# Patient Record
Sex: FEMALE | Race: WHITE | NOT HISPANIC OR LATINO | Employment: OTHER | URBAN - METROPOLITAN AREA
[De-identification: names, ages, dates, MRNs, and addresses within clinical notes are randomized per-mention and may not be internally consistent; named-entity substitution may affect disease eponyms.]

---

## 2019-06-18 ENCOUNTER — APPOINTMENT (OUTPATIENT)
Dept: RADIOLOGY | Facility: MEDICAL CENTER | Age: 69
End: 2019-06-18
Payer: COMMERCIAL

## 2019-06-18 ENCOUNTER — OFFICE VISIT (OUTPATIENT)
Dept: OBGYN CLINIC | Facility: MEDICAL CENTER | Age: 69
End: 2019-06-18
Payer: COMMERCIAL

## 2019-06-18 VITALS — BODY MASS INDEX: 27.35 KG/M2 | WEIGHT: 160.2 LBS | HEIGHT: 64 IN

## 2019-06-18 DIAGNOSIS — M25.561 PAIN IN BOTH KNEES, UNSPECIFIED CHRONICITY: ICD-10-CM

## 2019-06-18 DIAGNOSIS — M25.561 PAIN IN BOTH KNEES, UNSPECIFIED CHRONICITY: Primary | ICD-10-CM

## 2019-06-18 DIAGNOSIS — M25.562 PAIN IN BOTH KNEES, UNSPECIFIED CHRONICITY: ICD-10-CM

## 2019-06-18 DIAGNOSIS — M25.562 PAIN IN BOTH KNEES, UNSPECIFIED CHRONICITY: Primary | ICD-10-CM

## 2019-06-18 DIAGNOSIS — M17.0 PRIMARY OSTEOARTHRITIS OF BOTH KNEES: ICD-10-CM

## 2019-06-18 PROCEDURE — 99203 OFFICE O/P NEW LOW 30 MIN: CPT | Performed by: EMERGENCY MEDICINE

## 2019-06-18 PROCEDURE — 73562 X-RAY EXAM OF KNEE 3: CPT

## 2019-06-24 ENCOUNTER — TELEPHONE (OUTPATIENT)
Dept: OBGYN CLINIC | Facility: HOSPITAL | Age: 69
End: 2019-06-24

## 2019-07-01 ENCOUNTER — EVALUATION (OUTPATIENT)
Dept: PHYSICAL THERAPY | Facility: CLINIC | Age: 69
End: 2019-07-01
Payer: COMMERCIAL

## 2019-07-01 DIAGNOSIS — M25.561 PAIN IN BOTH KNEES, UNSPECIFIED CHRONICITY: ICD-10-CM

## 2019-07-01 DIAGNOSIS — M17.0 PRIMARY OSTEOARTHRITIS OF BOTH KNEES: ICD-10-CM

## 2019-07-01 DIAGNOSIS — M25.562 PAIN IN BOTH KNEES, UNSPECIFIED CHRONICITY: ICD-10-CM

## 2019-07-01 PROCEDURE — 97161 PT EVAL LOW COMPLEX 20 MIN: CPT | Performed by: PHYSICAL THERAPIST

## 2019-07-01 PROCEDURE — 97110 THERAPEUTIC EXERCISES: CPT | Performed by: PHYSICAL THERAPIST

## 2019-07-01 RX ORDER — LANOLIN ALCOHOL/MO/W.PET/CERES
1 CREAM (GRAM) TOPICAL 2 TIMES DAILY
COMMUNITY

## 2019-07-01 RX ORDER — UREA 10 %
100 LOTION (ML) TOPICAL DAILY
COMMUNITY
End: 2019-08-19

## 2019-07-01 RX ORDER — RIBOFLAVIN (VITAMIN B2) 100 MG
100 TABLET ORAL DAILY
COMMUNITY

## 2019-07-01 RX ORDER — OMEGA-3S/DHA/EPA/FISH OIL/D3 300MG-1000
400 CAPSULE ORAL DAILY
COMMUNITY

## 2019-07-01 RX ORDER — MULTIVIT-MIN/IRON FUM/FOLIC AC 7.5 MG-4
1 TABLET ORAL DAILY
COMMUNITY

## 2019-07-01 NOTE — PROGRESS NOTES
PT Evaluation     Today's date: 2019  Patient name: Forest Mckay  : 1950  MRN: [id number]  Referring provider: Char Bellamy MD  Dx:   Encounter Diagnosis     ICD-10-CM    1  Pain in both knees, unspecified chronicity M25 561 Ambulatory referral to Physical Therapy    M25 562    2  Primary osteoarthritis of both knees M17 0 Ambulatory referral to Physical Therapy                  Assessment  Assessment details: Patient is a 80 y/o female with chief complaints of bilateral knee pain  Patient presents with decreased functional mobility due to increased pain, decreased strength, decreased ROM associated with mild OA and worsened due to several previous instances of left patellar dislocation  Patient will benefit from skilled physical therapy to address impairment and improve functional mobility  PT needed to allow for return to maximal function and improve quality of life  Impairments: abnormal or restricted ROM, activity intolerance, impaired physical strength, lacks appropriate home exercise program and pain with function  Understanding of Dx/Px/POC: good   Prognosis: good    Goals  STG within 4 weeks:   1  Patient to be independent in HEP  2  Reduce pain by 50% to improve quality of life  3  Improve left knee extension to 0 degrees to improve gait and stairs  4  Improve bilateral knee strength to 4+ to improve gait and stairs  LTG within 8 weeks:   1  Patient to be independent in ADLs/IADLs without difficulty  2  Patient to be able to perform reciprocal stairs for ascending and descending  3  Patient to be able to walk community distances without difficulty  Plan  Plan details: Due to self-report of 1 fall in the last year, will address balance, proprioception, and gait with this treatment cycle  TUG to be completed at next visit      Patient would benefit from: skilled physical therapy and PT eval  Planned modality interventions: cryotherapy, hydrotherapy, unattended electrical stimulation, H-Wave and TENS  Planned therapy interventions: therapeutic training, therapeutic exercise, therapeutic activities, stretching, strengthening, postural training, patient education, neuromuscular re-education, manual therapy, joint mobilization, IADL retraining, activity modification, ADL retraining, ADL training, body mechanics training, flexibility, functional ROM exercises, gait training, graded activity, graded exercise, graded motor, home exercise program, South taping, balance/weight bearing training, balance and abdominal trunk stabilization  Frequency: 2x week  Duration in weeks: 8  Plan of Care beginning date: 2019  Plan of Care expiration date: 2019  Treatment plan discussed with: patient        Subjective Evaluation    History of Present Illness  Date of onset: 2019  Mechanism of injury: Patient is a 80 y/o female with chief complaints of bilateral knee pain (left worse than right)  She states she has had knee problems since she was 17years old  She states she has had several knee injuries throughout the years, including patellar dislocations  She states she occasionally uses a cane for ambulation  Xrays of bilateral knees shows mild OA  She is referred for evaluation and treatment of bilateral knees  Recurrent probem    Pain  Current pain rating: 3  At best pain rating: 3  At worst pain ratin  Quality: dull ache and sharp  Relieving factors: rest  Progression: worsening    Social Support  Steps to enter house: yes  Stairs in house: yes   Lives in: multiple-level home  Lives with: spouse    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 )  Hand dominance: right      Diagnostic Tests  Abnormal x-ray: Mild OA     Treatments  No previous or current treatments  Patient Goals  Patient goal: to be able to perform reciprocal stairs         Objective     Palpation     Additional Palpation Details  Moderate pain with palpation of left knee medial and lateral joint lines and posterior aspect of knee  Mild pain with palpation of right knee at medial and lateral joint lines  Active Range of Motion   Left Knee   Flexion: 122 degrees   Extension: -5 degrees   Extensor lag: -5 degrees     Right Knee   Flexion: 125 degrees   Extension: 0 degrees   Extensor la degrees     Strength/Myotome Testing     Left Knee   Flexion: 4  Extension: 3    Right Knee   Flexion: 4  Extension: 4    General Comments:      Knee Comments  Pain with left knee varus and valgus stress testing; but no instability noted at L or R knee  Hypermobility of left patella  Flowsheet Rows      Most Recent Value   PT/OT G-Codes   Current Score  38   Projected Score  57             Precautions: h/o multiple left knee patellar dislocations       Manual              Left knee STR                                                                    Increased time spent on patient education with diagnosis, prognosis, goals of therapy, progression of therapy, and plan of care  All questions answered  Patient instructed to call clinic with questions or concerns  Written HEP provided to patient  Pt to hold on any exercise that increases pain  Exercise Diary              Recumbent bike NV            Calf stretch NV            Hamstring stretch NV            Quad set 3'x20            Adductor set 3"x20            SLR x10 ea            clamshell Red x20 ea                                                                                                                                                                                                    Pt instructed to ice 2-3x/day 10-15 min     Modalities

## 2019-07-03 ENCOUNTER — APPOINTMENT (OUTPATIENT)
Dept: PHYSICAL THERAPY | Facility: CLINIC | Age: 69
End: 2019-07-03
Payer: COMMERCIAL

## 2019-07-08 ENCOUNTER — OFFICE VISIT (OUTPATIENT)
Dept: PHYSICAL THERAPY | Facility: CLINIC | Age: 69
End: 2019-07-08
Payer: COMMERCIAL

## 2019-07-08 DIAGNOSIS — M17.0 PRIMARY OSTEOARTHRITIS OF BOTH KNEES: ICD-10-CM

## 2019-07-08 DIAGNOSIS — M25.561 PAIN IN BOTH KNEES, UNSPECIFIED CHRONICITY: Primary | ICD-10-CM

## 2019-07-08 DIAGNOSIS — M25.562 PAIN IN BOTH KNEES, UNSPECIFIED CHRONICITY: Primary | ICD-10-CM

## 2019-07-08 PROCEDURE — 97140 MANUAL THERAPY 1/> REGIONS: CPT

## 2019-07-08 PROCEDURE — 97110 THERAPEUTIC EXERCISES: CPT

## 2019-07-08 NOTE — PROGRESS NOTES
Daily Note     Today's date: 2019  Patient name: Edith Sherwood  : 1950  MRN: [id number]  Referring provider: Anurag Trujillo MD  Dx:   Encounter Diagnosis     ICD-10-CM    1  Pain in both knees, unspecified chronicity M25 561     M25 562    2  Primary osteoarthritis of both knees M17 0                   Subjective: Upon presentation patient repots B knee discomfort secondary to the "weather"  Objective: See treatment diary below      Assessment: Patient able to tolerate above charted TE with no complaints  Patient repots relief B knees following STM  VC required to facilitate proper form and dosage t/o TE  Increased time spent on education and importance of HEP  Patient offers no complaints post session  Plan: Cont per plan of care, progress as able  Precautions: h/o multiple left knee patellar dislocations       Manual              Left knee STR   B/l 12'                                                                   Exercise Diary             Recumbent bike NV 7 min           Calf stretch NV :30x4           Hamstring stretch NV :30x4           Quad set 3'x20 3'x20           Adductor set 3"x20 3"x20           SLR x10 ea x20ea           clamshell Red x20 ea Red x20 ea           Standing hip abd/ext  20xea                                                                                                                                                                                      Pt instructed to ice 2-3x/day 10-15 min     Modalities

## 2019-07-10 ENCOUNTER — OFFICE VISIT (OUTPATIENT)
Dept: PHYSICAL THERAPY | Facility: CLINIC | Age: 69
End: 2019-07-10
Payer: COMMERCIAL

## 2019-07-10 DIAGNOSIS — M25.562 PAIN IN BOTH KNEES, UNSPECIFIED CHRONICITY: Primary | ICD-10-CM

## 2019-07-10 DIAGNOSIS — M25.561 PAIN IN BOTH KNEES, UNSPECIFIED CHRONICITY: Primary | ICD-10-CM

## 2019-07-10 DIAGNOSIS — M17.0 PRIMARY OSTEOARTHRITIS OF BOTH KNEES: ICD-10-CM

## 2019-07-10 PROCEDURE — 97110 THERAPEUTIC EXERCISES: CPT | Performed by: PHYSICAL THERAPIST

## 2019-07-10 PROCEDURE — 97140 MANUAL THERAPY 1/> REGIONS: CPT | Performed by: PHYSICAL THERAPIST

## 2019-07-10 PROCEDURE — 97112 NEUROMUSCULAR REEDUCATION: CPT | Performed by: PHYSICAL THERAPIST

## 2019-07-10 NOTE — PROGRESS NOTES
Daily Note     Today's date: 7/10/2019  Patient name: Kitty Alvarado  : 1950  MRN: [id number]  Referring provider: James Dunaway MD  Dx:   Encounter Diagnosis     ICD-10-CM    1  Pain in both knees, unspecified chronicity M25 561     M25 562    2  Primary osteoarthritis of both knees M17 0                   Subjective: Patient states she does feel a little better since starting the therapy and exercising  Objective: See treatment diary below      Assessment: Patient requires verbal cueing to stay on task  She requires cueing for correct exercise technique  Significantly reduced pain following STR to left knee and cold pack for bilateral knees post session  She leaves session very pleased and without complaint  She will continue to benefit from skilled PT to address impairment  Plan: Cont per plan of care, progress as able  Precautions: h/o multiple left knee patellar dislocations       Manual   7/8 7/10          Left knee STR   B/l 12' L- 10'                                                                  Exercise Diary  7/1 7/8 7/10          Recumbent bike NV 7 min 10 min          Calf stretch NV :30x4           Hamstring stretch NV :30x4           Quad set 3"x20 3"x20 3"x20          Adductor set 3"x20 3"x20           SLR x10 ea x20ea x20ea          clamshell Red x20 ea Red x20 ea Red x 30           Standing hip abd/ext  20xea Red x 30 ea          Side step with band   Red x 4                                                                                                                                                                        Pt instructed to ice 2-3x/day 10-15 min     Modalities    7/10          B knees   10'

## 2019-07-15 ENCOUNTER — OFFICE VISIT (OUTPATIENT)
Dept: PHYSICAL THERAPY | Facility: CLINIC | Age: 69
End: 2019-07-15
Payer: COMMERCIAL

## 2019-07-15 DIAGNOSIS — M25.562 PAIN IN BOTH KNEES, UNSPECIFIED CHRONICITY: Primary | ICD-10-CM

## 2019-07-15 DIAGNOSIS — M25.561 PAIN IN BOTH KNEES, UNSPECIFIED CHRONICITY: Primary | ICD-10-CM

## 2019-07-15 DIAGNOSIS — M17.0 PRIMARY OSTEOARTHRITIS OF BOTH KNEES: ICD-10-CM

## 2019-07-15 PROCEDURE — 97110 THERAPEUTIC EXERCISES: CPT | Performed by: PHYSICAL THERAPIST

## 2019-07-15 PROCEDURE — 97140 MANUAL THERAPY 1/> REGIONS: CPT | Performed by: PHYSICAL THERAPIST

## 2019-07-15 NOTE — PROGRESS NOTES
Daily Note     Today's date: 7/15/2019  Patient name: Niranjan Sainz  : 1950  MRN: [id number]  Referring provider: Sun Kessler MD  Dx:   Encounter Diagnosis     ICD-10-CM    1  Pain in both knees, unspecified chronicity M25 561     M25 562    2  Primary osteoarthritis of both knees M17 0                   Subjective: Patient states, "I'm on my feet 20 out of 24 hours of the day " She states she was more sore over the weekend, but she is unsure specifically what she had done  Objective: See treatment diary below      Assessment: Patient requires verbal cueing to stay on task  She has difficulty lying supine and did not tolerate propped up on wedge well this visit, so table exercises mostly held today  Trial of kinesiotaping for left knee with relief  Educated in taking tape off with increased pain  She will continue to benefit from skilled PT to address impairment  Plan: Cont per plan of care, progress as able  Precautions: h/o multiple left knee patellar dislocations; unable to lie supine       Manual   7/8 7/10 7/15         Left knee STR   B/l 12' L- 10' IASTM L med knee 10'         Kinesiotape- L knee     KS                                                    Exercise Diary  7/1 7/8 7/10 7/15         Recumbent bike NV 7 min 10 min 10 min         Calf stretch NV :30x4           Hamstring stretch NV :30x4           Quad set 3"x20 3"x20 3"x20 SAQ x20         Adductor set 3"x20 3"x20           SLR x10 ea x20ea x20ea          clamshell Red x20 ea Red x20 ea Red x 30           Standing hip abd/ext  20xea Red x 30 ea Red x 30 ea         Side step with band   Red x 4 Red x 4         Mini squats    3x10                                                                                                                                                          Pt instructed to ice 2-3x/day 10-15 min     Modalities    7/10 7/15         B knees   10' decline

## 2019-07-16 ENCOUNTER — HOSPITAL ENCOUNTER (INPATIENT)
Facility: HOSPITAL | Age: 69
LOS: 2 days | Discharge: HOME HEALTH | DRG: 607 | End: 2019-07-18
Attending: EMERGENCY MEDICINE | Admitting: STUDENT IN AN ORGANIZED HEALTH CARE EDUCATION/TRAINING PROGRAM
Payer: COMMERCIAL

## 2019-07-16 ENCOUNTER — APPOINTMENT (EMERGENCY)
Dept: CT IMAGING | Facility: HOSPITAL | Age: 69
DRG: 607 | End: 2019-07-16
Payer: COMMERCIAL

## 2019-07-16 DIAGNOSIS — S21.201A OPEN WOUND OF RIGHT SIDE OF BACK, INITIAL ENCOUNTER: ICD-10-CM

## 2019-07-16 DIAGNOSIS — L57.8 SOLAR DERMATITIS: ICD-10-CM

## 2019-07-16 DIAGNOSIS — L03.90 CELLULITIS: Primary | ICD-10-CM

## 2019-07-16 LAB
ALBUMIN SERPL BCP-MCNC: 3.7 G/DL (ref 3.5–5)
ALP SERPL-CCNC: 92 U/L (ref 46–116)
ALT SERPL W P-5'-P-CCNC: 22 U/L (ref 12–78)
ANION GAP SERPL CALCULATED.3IONS-SCNC: 6 MMOL/L (ref 4–13)
AST SERPL W P-5'-P-CCNC: 16 U/L (ref 5–45)
BASOPHILS # BLD AUTO: 0.02 THOUSANDS/ΜL (ref 0–0.1)
BASOPHILS NFR BLD AUTO: 0 % (ref 0–1)
BILIRUB SERPL-MCNC: 0.4 MG/DL (ref 0.2–1)
BUN SERPL-MCNC: 11 MG/DL (ref 5–25)
CALCIUM SERPL-MCNC: 9.9 MG/DL (ref 8.3–10.1)
CHLORIDE SERPL-SCNC: 96 MMOL/L (ref 100–108)
CO2 SERPL-SCNC: 31 MMOL/L (ref 21–32)
CREAT SERPL-MCNC: 0.71 MG/DL (ref 0.6–1.3)
EOSINOPHIL # BLD AUTO: 0.07 THOUSAND/ΜL (ref 0–0.61)
EOSINOPHIL NFR BLD AUTO: 1 % (ref 0–6)
ERYTHROCYTE [DISTWIDTH] IN BLOOD BY AUTOMATED COUNT: 12.8 % (ref 11.6–15.1)
GFR SERPL CREATININE-BSD FRML MDRD: 89 ML/MIN/1.73SQ M
GLUCOSE SERPL-MCNC: 109 MG/DL (ref 65–140)
HCT VFR BLD AUTO: 36.1 % (ref 34.8–46.1)
HGB BLD-MCNC: 12 G/DL (ref 11.5–15.4)
IMM GRANULOCYTES # BLD AUTO: 0.01 THOUSAND/UL (ref 0–0.2)
IMM GRANULOCYTES NFR BLD AUTO: 0 % (ref 0–2)
LYMPHOCYTES # BLD AUTO: 1.13 THOUSANDS/ΜL (ref 0.6–4.47)
LYMPHOCYTES NFR BLD AUTO: 20 % (ref 14–44)
MCH RBC QN AUTO: 28.6 PG (ref 26.8–34.3)
MCHC RBC AUTO-ENTMCNC: 33.2 G/DL (ref 31.4–37.4)
MCV RBC AUTO: 86 FL (ref 82–98)
MONOCYTES # BLD AUTO: 0.38 THOUSAND/ΜL (ref 0.17–1.22)
MONOCYTES NFR BLD AUTO: 7 % (ref 4–12)
NEUTROPHILS # BLD AUTO: 4.16 THOUSANDS/ΜL (ref 1.85–7.62)
NEUTS SEG NFR BLD AUTO: 72 % (ref 43–75)
NRBC BLD AUTO-RTO: 0 /100 WBCS
PLATELET # BLD AUTO: 288 THOUSANDS/UL (ref 149–390)
PMV BLD AUTO: 8.9 FL (ref 8.9–12.7)
POTASSIUM SERPL-SCNC: 4.4 MMOL/L (ref 3.5–5.3)
PROT SERPL-MCNC: 7.7 G/DL (ref 6.4–8.2)
RBC # BLD AUTO: 4.19 MILLION/UL (ref 3.81–5.12)
SODIUM SERPL-SCNC: 133 MMOL/L (ref 136–145)
WBC # BLD AUTO: 5.77 THOUSAND/UL (ref 4.31–10.16)

## 2019-07-16 PROCEDURE — 96365 THER/PROPH/DIAG IV INF INIT: CPT

## 2019-07-16 PROCEDURE — 36415 COLL VENOUS BLD VENIPUNCTURE: CPT | Performed by: PHYSICIAN ASSISTANT

## 2019-07-16 PROCEDURE — 87040 BLOOD CULTURE FOR BACTERIA: CPT | Performed by: PHYSICIAN ASSISTANT

## 2019-07-16 PROCEDURE — 99285 EMERGENCY DEPT VISIT HI MDM: CPT | Performed by: EMERGENCY MEDICINE

## 2019-07-16 PROCEDURE — 80053 COMPREHEN METABOLIC PANEL: CPT | Performed by: PHYSICIAN ASSISTANT

## 2019-07-16 PROCEDURE — 74177 CT ABD & PELVIS W/CONTRAST: CPT

## 2019-07-16 PROCEDURE — 71260 CT THORAX DX C+: CPT

## 2019-07-16 PROCEDURE — 85025 COMPLETE CBC W/AUTO DIFF WBC: CPT | Performed by: PHYSICIAN ASSISTANT

## 2019-07-16 PROCEDURE — 99285 EMERGENCY DEPT VISIT HI MDM: CPT

## 2019-07-16 RX ORDER — CLINDAMYCIN PHOSPHATE 600 MG/50ML
600 INJECTION INTRAVENOUS ONCE
Status: COMPLETED | OUTPATIENT
Start: 2019-07-16 | End: 2019-07-16

## 2019-07-16 RX ADMIN — CLINDAMYCIN PHOSPHATE 600 MG: 600 INJECTION, SOLUTION INTRAVENOUS at 21:45

## 2019-07-16 RX ADMIN — IOHEXOL 100 ML: 350 INJECTION, SOLUTION INTRAVENOUS at 21:55

## 2019-07-17 ENCOUNTER — ANESTHESIA EVENT (INPATIENT)
Dept: PERIOP | Facility: HOSPITAL | Age: 69
DRG: 607 | End: 2019-07-17
Payer: COMMERCIAL

## 2019-07-17 ENCOUNTER — ANESTHESIA (INPATIENT)
Dept: PERIOP | Facility: HOSPITAL | Age: 69
DRG: 607 | End: 2019-07-17
Payer: COMMERCIAL

## 2019-07-17 VITALS
HEART RATE: 73 BPM | DIASTOLIC BLOOD PRESSURE: 63 MMHG | HEIGHT: 64 IN | WEIGHT: 160.27 LBS | TEMPERATURE: 98 F | SYSTOLIC BLOOD PRESSURE: 145 MMHG | BODY MASS INDEX: 27.36 KG/M2 | OXYGEN SATURATION: 98 % | RESPIRATION RATE: 18 BRPM

## 2019-07-17 PROBLEM — S21.209A WOUND, OPEN, BACK: Status: ACTIVE | Noted: 2019-07-17

## 2019-07-17 PROBLEM — S21.201A OPEN WOUND OF RIGHT SIDE OF BACK: Status: ACTIVE | Noted: 2019-07-16

## 2019-07-17 LAB
ANION GAP SERPL CALCULATED.3IONS-SCNC: 10 MMOL/L (ref 4–13)
BUN SERPL-MCNC: 9 MG/DL (ref 5–25)
CALCIUM SERPL-MCNC: 9.7 MG/DL (ref 8.3–10.1)
CHLORIDE SERPL-SCNC: 98 MMOL/L (ref 100–108)
CO2 SERPL-SCNC: 25 MMOL/L (ref 21–32)
CREAT SERPL-MCNC: 0.71 MG/DL (ref 0.6–1.3)
ERYTHROCYTE [DISTWIDTH] IN BLOOD BY AUTOMATED COUNT: 12.7 % (ref 11.6–15.1)
GFR SERPL CREATININE-BSD FRML MDRD: 89 ML/MIN/1.73SQ M
GLUCOSE SERPL-MCNC: 94 MG/DL (ref 65–140)
HCT VFR BLD AUTO: 35.3 % (ref 34.8–46.1)
HGB BLD-MCNC: 11.7 G/DL (ref 11.5–15.4)
MCH RBC QN AUTO: 28.5 PG (ref 26.8–34.3)
MCHC RBC AUTO-ENTMCNC: 33.1 G/DL (ref 31.4–37.4)
MCV RBC AUTO: 86 FL (ref 82–98)
PLATELET # BLD AUTO: 277 THOUSANDS/UL (ref 149–390)
PMV BLD AUTO: 9.1 FL (ref 8.9–12.7)
POTASSIUM SERPL-SCNC: 4.2 MMOL/L (ref 3.5–5.3)
RBC # BLD AUTO: 4.1 MILLION/UL (ref 3.81–5.12)
SODIUM SERPL-SCNC: 133 MMOL/L (ref 136–145)
WBC # BLD AUTO: 4.11 THOUSAND/UL (ref 4.31–10.16)

## 2019-07-17 PROCEDURE — 11604 EXC TR-EXT MAL+MARG 3.1-4 CM: CPT | Performed by: PHYSICIAN ASSISTANT

## 2019-07-17 PROCEDURE — 99223 1ST HOSP IP/OBS HIGH 75: CPT | Performed by: PHYSICIAN ASSISTANT

## 2019-07-17 PROCEDURE — 88305 TISSUE EXAM BY PATHOLOGIST: CPT | Performed by: PATHOLOGY

## 2019-07-17 PROCEDURE — 11604 EXC TR-EXT MAL+MARG 3.1-4 CM: CPT | Performed by: SURGERY

## 2019-07-17 PROCEDURE — 0JB70ZX EXCISION OF BACK SUBCUTANEOUS TISSUE AND FASCIA, OPEN APPROACH, DIAGNOSTIC: ICD-10-PCS | Performed by: INTERNAL MEDICINE

## 2019-07-17 PROCEDURE — 85027 COMPLETE CBC AUTOMATED: CPT | Performed by: PHYSICIAN ASSISTANT

## 2019-07-17 PROCEDURE — 99223 1ST HOSP IP/OBS HIGH 75: CPT | Performed by: SURGERY

## 2019-07-17 PROCEDURE — 80048 BASIC METABOLIC PNL TOTAL CA: CPT | Performed by: PHYSICIAN ASSISTANT

## 2019-07-17 RX ORDER — SODIUM CHLORIDE, SODIUM LACTATE, POTASSIUM CHLORIDE, CALCIUM CHLORIDE 600; 310; 30; 20 MG/100ML; MG/100ML; MG/100ML; MG/100ML
INJECTION, SOLUTION INTRAVENOUS CONTINUOUS PRN
Status: DISCONTINUED | OUTPATIENT
Start: 2019-07-17 | End: 2019-07-17 | Stop reason: SURG

## 2019-07-17 RX ORDER — ACETAMINOPHEN 325 MG/1
650 TABLET ORAL EVERY 6 HOURS PRN
Status: DISCONTINUED | OUTPATIENT
Start: 2019-07-17 | End: 2019-07-18 | Stop reason: HOSPADM

## 2019-07-17 RX ORDER — LIDOCAINE HYDROCHLORIDE 10 MG/ML
INJECTION, SOLUTION INFILTRATION; PERINEURAL AS NEEDED
Status: DISCONTINUED | OUTPATIENT
Start: 2019-07-17 | End: 2019-07-17 | Stop reason: HOSPADM

## 2019-07-17 RX ORDER — SODIUM CHLORIDE 9 MG/ML
75 INJECTION, SOLUTION INTRAVENOUS CONTINUOUS
Status: DISCONTINUED | OUTPATIENT
Start: 2019-07-17 | End: 2019-07-18

## 2019-07-17 RX ORDER — PROPOFOL 10 MG/ML
INJECTION, EMULSION INTRAVENOUS AS NEEDED
Status: DISCONTINUED | OUTPATIENT
Start: 2019-07-17 | End: 2019-07-17 | Stop reason: SURG

## 2019-07-17 RX ORDER — FENTANYL CITRATE/PF 50 MCG/ML
25 SYRINGE (ML) INJECTION
Status: DISCONTINUED | OUTPATIENT
Start: 2019-07-17 | End: 2019-07-17 | Stop reason: HOSPADM

## 2019-07-17 RX ORDER — HYDROMORPHONE HCL/PF 1 MG/ML
0.5 SYRINGE (ML) INJECTION
Status: DISCONTINUED | OUTPATIENT
Start: 2019-07-17 | End: 2019-07-17 | Stop reason: HOSPADM

## 2019-07-17 RX ORDER — ONDANSETRON 2 MG/ML
4 INJECTION INTRAMUSCULAR; INTRAVENOUS ONCE AS NEEDED
Status: DISCONTINUED | OUTPATIENT
Start: 2019-07-17 | End: 2019-07-17 | Stop reason: HOSPADM

## 2019-07-17 RX ORDER — PROPOFOL 10 MG/ML
INJECTION, EMULSION INTRAVENOUS CONTINUOUS PRN
Status: DISCONTINUED | OUTPATIENT
Start: 2019-07-17 | End: 2019-07-17 | Stop reason: SURG

## 2019-07-17 RX ORDER — SODIUM CHLORIDE, SODIUM LACTATE, POTASSIUM CHLORIDE, CALCIUM CHLORIDE 600; 310; 30; 20 MG/100ML; MG/100ML; MG/100ML; MG/100ML
100 INJECTION, SOLUTION INTRAVENOUS CONTINUOUS
Status: DISPENSED | OUTPATIENT
Start: 2019-07-17 | End: 2019-07-17

## 2019-07-17 RX ORDER — CLINDAMYCIN PHOSPHATE 600 MG/50ML
600 INJECTION INTRAVENOUS EVERY 8 HOURS
Status: DISCONTINUED | OUTPATIENT
Start: 2019-07-17 | End: 2019-07-18

## 2019-07-17 RX ORDER — NICOTINE 21 MG/24HR
1 PATCH, TRANSDERMAL 24 HOURS TRANSDERMAL DAILY
Status: DISCONTINUED | OUTPATIENT
Start: 2019-07-17 | End: 2019-07-18 | Stop reason: HOSPADM

## 2019-07-17 RX ORDER — ONDANSETRON 2 MG/ML
4 INJECTION INTRAMUSCULAR; INTRAVENOUS EVERY 6 HOURS PRN
Status: DISCONTINUED | OUTPATIENT
Start: 2019-07-17 | End: 2019-07-18 | Stop reason: HOSPADM

## 2019-07-17 RX ORDER — UBIDECARENONE 75 MG
100 CAPSULE ORAL DAILY
Status: DISCONTINUED | OUTPATIENT
Start: 2019-07-17 | End: 2019-07-18 | Stop reason: HOSPADM

## 2019-07-17 RX ORDER — OXYCODONE HYDROCHLORIDE AND ACETAMINOPHEN 5; 325 MG/1; MG/1
1 TABLET ORAL EVERY 4 HOURS PRN
Status: DISCONTINUED | OUTPATIENT
Start: 2019-07-17 | End: 2019-07-18 | Stop reason: HOSPADM

## 2019-07-17 RX ORDER — OMEGA-3S/DHA/EPA/FISH OIL/D3 300MG-1000
400 CAPSULE ORAL DAILY
Status: DISCONTINUED | OUTPATIENT
Start: 2019-07-17 | End: 2019-07-18 | Stop reason: HOSPADM

## 2019-07-17 RX ORDER — LIDOCAINE HYDROCHLORIDE 10 MG/ML
INJECTION, SOLUTION EPIDURAL; INFILTRATION; INTRACAUDAL; PERINEURAL AS NEEDED
Status: DISCONTINUED | OUTPATIENT
Start: 2019-07-17 | End: 2019-07-17 | Stop reason: SURG

## 2019-07-17 RX ORDER — OXYCODONE HYDROCHLORIDE AND ACETAMINOPHEN 5; 325 MG/1; MG/1
2 TABLET ORAL EVERY 6 HOURS PRN
Status: DISCONTINUED | OUTPATIENT
Start: 2019-07-17 | End: 2019-07-18 | Stop reason: HOSPADM

## 2019-07-17 RX ORDER — MIDAZOLAM HYDROCHLORIDE 1 MG/ML
INJECTION INTRAMUSCULAR; INTRAVENOUS AS NEEDED
Status: DISCONTINUED | OUTPATIENT
Start: 2019-07-17 | End: 2019-07-17 | Stop reason: SURG

## 2019-07-17 RX ORDER — RIBOFLAVIN (VITAMIN B2) 100 MG
100 TABLET ORAL DAILY
Status: DISCONTINUED | OUTPATIENT
Start: 2019-07-17 | End: 2019-07-17

## 2019-07-17 RX ORDER — LANOLIN ALCOHOL/MO/W.PET/CERES
3 CREAM (GRAM) TOPICAL
Status: DISCONTINUED | OUTPATIENT
Start: 2019-07-17 | End: 2019-07-18 | Stop reason: HOSPADM

## 2019-07-17 RX ORDER — METOCLOPRAMIDE HYDROCHLORIDE 5 MG/ML
10 INJECTION INTRAMUSCULAR; INTRAVENOUS ONCE AS NEEDED
Status: DISCONTINUED | OUTPATIENT
Start: 2019-07-17 | End: 2019-07-17 | Stop reason: HOSPADM

## 2019-07-17 RX ORDER — MAGNESIUM HYDROXIDE/ALUMINUM HYDROXICE/SIMETHICONE 120; 1200; 1200 MG/30ML; MG/30ML; MG/30ML
30 SUSPENSION ORAL EVERY 6 HOURS PRN
Status: DISCONTINUED | OUTPATIENT
Start: 2019-07-17 | End: 2019-07-18 | Stop reason: HOSPADM

## 2019-07-17 RX ADMIN — Medication 1 TABLET: at 10:39

## 2019-07-17 RX ADMIN — CLINDAMYCIN PHOSPHATE 600 MG: 600 INJECTION, SOLUTION INTRAVENOUS at 06:42

## 2019-07-17 RX ADMIN — CLINDAMYCIN PHOSPHATE 600 MG: 600 INJECTION, SOLUTION INTRAVENOUS at 22:54

## 2019-07-17 RX ADMIN — VITAM B12 100 MCG: 100 TAB at 10:40

## 2019-07-17 RX ADMIN — PROPOFOL 40 MG: 10 INJECTION, EMULSION INTRAVENOUS at 14:00

## 2019-07-17 RX ADMIN — CHOLECALCIFEROL TAB 10 MCG (400 UNIT) 400 UNITS: 10 TAB at 10:40

## 2019-07-17 RX ADMIN — OXYCODONE HYDROCHLORIDE AND ACETAMINOPHEN 1 TABLET: 5; 325 TABLET ORAL at 18:35

## 2019-07-17 RX ADMIN — SODIUM CHLORIDE, SODIUM LACTATE, POTASSIUM CHLORIDE, AND CALCIUM CHLORIDE: .6; .31; .03; .02 INJECTION, SOLUTION INTRAVENOUS at 13:53

## 2019-07-17 RX ADMIN — LIDOCAINE HYDROCHLORIDE 50 MG: 10 INJECTION, SOLUTION EPIDURAL; INFILTRATION; INTRACAUDAL; PERINEURAL at 14:00

## 2019-07-17 RX ADMIN — PROPOFOL 80 MCG/KG/MIN: 10 INJECTION, EMULSION INTRAVENOUS at 14:01

## 2019-07-17 RX ADMIN — CLINDAMYCIN PHOSPHATE 600 MG: 600 INJECTION, SOLUTION INTRAVENOUS at 13:51

## 2019-07-17 RX ADMIN — SODIUM CHLORIDE 75 ML/HR: 0.9 INJECTION, SOLUTION INTRAVENOUS at 06:39

## 2019-07-17 RX ADMIN — MIDAZOLAM HYDROCHLORIDE 2 MG: 1 INJECTION, SOLUTION INTRAMUSCULAR; INTRAVENOUS at 13:53

## 2019-07-17 NOTE — ANESTHESIA POSTPROCEDURE EVALUATION
Post-Op Assessment Note    CV Status:  Stable  Pain Score: 0    Pain management: adequate     Mental Status:  Sleepy   Hydration Status:  Stable   PONV Controlled:  None   Airway Patency:  Patent and adequate   Post Op Vitals Reviewed: Yes      Staff: CRNA           BP   114/57   Temp   97 5   Pulse  74   Resp   16   SpO2   97

## 2019-07-17 NOTE — ED NOTES
Pt reports open wound on back for past 4 months but has had this issue for past 10 years       Odell Harrington RN  07/16/19 2100

## 2019-07-17 NOTE — PLAN OF CARE
Problem: PAIN - ADULT  Goal: Verbalizes/displays adequate comfort level or baseline comfort level  Description  Interventions:  - Encourage patient to monitor pain and request assistance  - Assess pain using appropriate pain scale  - Administer analgesics based on type and severity of pain and evaluate response  - Implement non-pharmacological measures as appropriate and evaluate response  - Consider cultural and social influences on pain and pain management  - Notify physician/advanced practitioner if interventions unsuccessful or patient reports new pain  Outcome: Progressing     Problem: INFECTION - ADULT  Goal: Absence or prevention of progression during hospitalization  Description  INTERVENTIONS:  - Assess and monitor for signs and symptoms of infection  - Monitor lab/diagnostic results  - Monitor all insertion sites, i e  indwelling lines, tubes, and drains  - Monitor endotracheal (as able) and nasal secretions for changes in amount and color  - [place] appropriate cooling/warming therapies per order  - Administer medications as ordered  - Instruct and encourage patient and family to use good hand hygiene technique  - Identify and instruct in appropriate isolation precautions for identified infection/condition  Outcome: Progressing  Goal: Absence of fever/infection during neutropenic period  Description  INTERVENTIONS:  - Monitor WBC  - Implement neutropenic guidelines  Outcome: Progressing     Problem: SAFETY ADULT  Goal: Patient will remain free of falls  Description  INTERVENTIONS:  - Assess patient frequently for physical needs  -  Identify cognitive and physical deficits and behaviors that affect risk of falls    -  [place] fall precautions as indicated by assessment   - Educate patient/family on patient safety including physical limitations  - Instruct patient to call for assistance with activity based on assessment  - Modify environment to reduce risk of injury  - Consider OT/PT consult to assist with strengthening/mobility  Outcome: Progressing  Goal: Maintain or return to baseline ADL function  Description  INTERVENTIONS:  -  Assess patient's ability to carry out ADLs; assess patient's baseline for ADL function and identify physical deficits which impact ability to perform ADLs (bathing, care of mouth/teeth, toileting, grooming, dressing, etc )  - Assess/evaluate cause of self-care deficits   - Assess range of motion  - Assess patient's mobility; develop plan if impaired  - Assess patient's need for assistive devices and provide as appropriate  - Encourage maximum independence but intervene and supervise when necessary  ¯ Involve family in performance of ADLs  ¯ Assess for home care needs following discharge   ¯ Request OT consult to assist with ADL evaluation and planning for discharge  ¯ Provide patient education as appropriate  Outcome: Progressing  Goal: Maintain or return mobility status to optimal level  Description  INTERVENTIONS:  - Assess patient's baseline mobility status (ambulation, transfers, stairs, etc )    - Identify cognitive and physical deficits and behaviors that affect mobility  - Identify mobility aids required to assist with transfers and/or ambulation (gait belt, sit-to-stand, lift, walker, cane, etc )  - [place] fall precautions as indicated by assessment  - Record patient progress and toleration of activity level on Mobility SBAR; progress patient to next Phase/Stage  - Instruct patient to call for assistance with activity based on assessment  - Request Rehabilitation consult to assist with strengthening/weightbearing, etc   Outcome: Progressing     Problem: DISCHARGE PLANNING  Goal: Discharge to home or other facility with appropriate resources  Description  INTERVENTIONS:  - Identify barriers to discharge w/patient and caregiver  - Arrange for needed discharge resources and transportation as appropriate  - Identify discharge learning needs (meds, wound care, etc )  - Arrange for interpretive services to assist at discharge as needed  - Refer to Case Management Department for coordinating discharge planning if the patient needs post-hospital services based on physician/advanced practitioner order or complex needs related to functional status, cognitive ability, or social support system  Outcome: Progressing     Problem: Knowledge Deficit  Goal: Patient/family/caregiver demonstrates understanding of disease process, treatment plan, medications, and discharge instructions  Description  Complete learning assessment and assess knowledge base    Interventions:  - Provide teaching at level of understanding  - Provide teaching via preferred learning methods  Outcome: Progressing

## 2019-07-17 NOTE — ED PROVIDER NOTES
History  Chief Complaint   Patient presents with    Medical Problem     pt sent here from PCP for possible necrotizing fascitis     Patient is a 80-year-old female presents emergency department for evaluation going wound on her back  Patient states for the past decade she has been having recurrent infections on her back  She states that typically the area will be small in she will play antibiotic ointment and a Band-Aid  States that it would heal up with topical treatment  She states that about 4 months ago the wound reappeared  She states that she was applying antibiotic ointment with no relief  She states that despite this the wound became larger and larger  She states that she was at a well visit today with her family physician asked him to evaluate her back  He was concerned and sent her to the emergency department from more aggressive treatment  Patient has been afebrile  Patient has no known medical problems  Patient states this was been present for last 4 months has been getting progressively worse over that time period  Prior to Admission Medications   Prescriptions Last Dose Informant Patient Reported? Taking?    Ascorbic Acid (VITAMIN C) 100 MG tablet 7/16/2019 at Unknown time  Yes Yes   Sig: Take 100 mg by mouth daily   Multiple Vitamins-Minerals (MULTIVITAMIN WITH MINERALS) tablet 7/16/2019 at Unknown time  Yes Yes   Sig: Take 1 tablet by mouth daily   cholecalciferol (VITAMIN D3) 400 units tablet 7/16/2019 at Unknown time  Yes Yes   Sig: Take 400 Units by mouth daily   glucosamine-chondroitin 500-400 MG tablet 7/16/2019 at Unknown time  Yes Yes   Sig: Take 1 tablet by mouth 2 (two) times a day    vitamin B-12 (CYANOCOBALAMIN) 100 MCG tablet 7/16/2019 at Unknown time  Yes Yes   Sig: Take 100 mcg by mouth daily   vitamin E 100 UNIT capsule 7/16/2019 at Unknown time  Yes Yes   Sig: Take 100 Units by mouth daily      Facility-Administered Medications: None       Past Medical History: Diagnosis Date    Seasonal allergies        Past Surgical History:   Procedure Laterality Date     SECTION         Family History   Problem Relation Age of Onset    Heart disease Mother     Cancer Father      I have reviewed and agree with the history as documented  Social History     Tobacco Use    Smoking status: Current Every Day Smoker     Packs/day: 0 50     Types: Cigarettes    Smokeless tobacco: Never Used   Substance Use Topics    Alcohol use: Not Currently    Drug use: Not Currently        Review of Systems   Constitutional: Negative for fever  Respiratory: Negative for shortness of breath  Cardiovascular: Negative for chest pain  Skin: Positive for wound  All other systems reviewed and are negative  Physical Exam  Physical Exam   Constitutional: She is oriented to person, place, and time  She appears well-developed and well-nourished  HENT:   Head: Normocephalic and atraumatic  Right Ear: External ear normal    Left Ear: External ear normal    Nose: Nose normal    Mouth/Throat: Oropharynx is clear and moist    Eyes: Pupils are equal, round, and reactive to light  Conjunctivae and EOM are normal    Neck: Normal range of motion  Cardiovascular: Normal rate, regular rhythm and normal heart sounds  Pulmonary/Chest: Effort normal and breath sounds normal    Abdominal: Soft  Bowel sounds are normal    Musculoskeletal: Normal range of motion  Arms:  Neurological: She is alert and oriented to person, place, and time  Psychiatric: She has a normal mood and affect  Her behavior is normal  Judgment and thought content normal    Vitals reviewed        Vital Signs  ED Triage Vitals   Temperature Pulse Respirations Blood Pressure SpO2   19 1840 19 1840 19 18419 1840   98 6 °F (37 °C) 64 18 (!) 191/88 99 %      Temp Source Heart Rate Source Patient Position - Orthostatic VS BP Location FiO2 (%)   19 07/16/19 1840 07/16/19 1840 --   Oral Monitor Sitting Left arm       Pain Score       07/16/19 2324       No Pain           Vitals:    07/16/19 1840 07/16/19 2324 07/17/19 0100   BP: (!) 191/88 (!) 177/81 134/85   Pulse: 64 71 77   Patient Position - Orthostatic VS: Sitting Sitting          Visual Acuity      ED Medications  Medications   vitamin C tablet 100 mg (has no administration in time range)   cholecalciferol (VITAMIN D3) tablet 400 Units (has no administration in time range)   multivitamin-minerals (CENTRUM) tablet 1 tablet (has no administration in time range)   cyanocobalamin (VITAMIN B-12) tablet 100 mcg (has no administration in time range)   vitamin E capsule 100 Units (has no administration in time range)   sodium chloride 0 9 % infusion (has no administration in time range)   magnesium hydroxide (MILK OF MAGNESIA) 400 mg/5 mL oral suspension 30 mL (has no administration in time range)   ondansetron (ZOFRAN) injection 4 mg (has no administration in time range)   aluminum-magnesium hydroxide-simethicone (MYLANTA) 200-200-20 mg/5 mL oral suspension 30 mL (has no administration in time range)   nicotine (NICODERM CQ) 14 mg/24hr TD 24 hr patch 1 patch (has no administration in time range)   enoxaparin (LOVENOX) subcutaneous injection 40 mg (has no administration in time range)   acetaminophen (TYLENOL) tablet 650 mg (has no administration in time range)   melatonin tablet 3 mg (has no administration in time range)   clindamycin (CLEOCIN) IVPB (premix) 600 mg (has no administration in time range)   clindamycin (CLEOCIN) IVPB (premix) 600 mg (0 mg Intravenous Stopped 7/16/19 2249)   iohexol (OMNIPAQUE) 350 MG/ML injection (MULTI-DOSE) 100 mL (100 mL Intravenous Given 7/16/19 2155)       Diagnostic Studies  Results Reviewed     Procedure Component Value Units Date/Time    Comprehensive metabolic panel [[id number]]  (Abnormal) Collected:  07/16/19 2052    Lab Status:  Final result Specimen:  Blood from Arm, Right Updated:  07/16/19 2115     Sodium 133 mmol/L      Potassium 4 4 mmol/L      Chloride 96 mmol/L      CO2 31 mmol/L      ANION GAP 6 mmol/L      BUN 11 mg/dL      Creatinine 0 71 mg/dL      Glucose 109 mg/dL      Calcium 9 9 mg/dL      AST 16 U/L      ALT 22 U/L      Alkaline Phosphatase 92 U/L      Total Protein 7 7 g/dL      Albumin 3 7 g/dL      Total Bilirubin 0 40 mg/dL      eGFR 89 ml/min/1 73sq m     Narrative:       National Kid[employer] guidelines for Chronic Kidney Disease (CKD):     Stage 1 with normal or high GFR (GFR > 90 mL/min/1 73 square meters)    Stage 2 Mild CKD (GFR = 60-89 mL/min/1 73 square meters)    Stage 3A Moderate CKD (GFR = 45-59 mL/min/1 73 square meters)    Stage 3B Moderate CKD (GFR = 30-44 mL/min/1 73 square meters)    Stage 4 Severe CKD (GFR = 15-29 mL/min/1 73 square meters)    Stage 5 End Stage CKD (GFR <15 mL/min/1 73 square meters)  Note: GFR calculation is accurate only with a steady state creatinine    CBC and differential [[id number]] Collected:  07/16/19 2052    Lab Status:  Final result Specimen:  Blood from Arm, Right Updated:  07/16/19 2100     WBC 5 77 Thousand/uL      RBC 4 19 Million/uL      Hemoglobin 12 0 g/dL      Hematocrit 36 1 %      MCV 86 fL      MCH 28 6 pg      MCHC 33 2 g/dL      RDW 12 8 %      MPV 8 9 fL      Platelets 116 Thousands/uL      nRBC 0 /100 WBCs      Neutrophils Relative 72 %      Immat GRANS % 0 %      Lymphocytes Relative 20 %      Monocytes Relative 7 %      Eosinophils Relative 1 %      Basophils Relative 0 %      Neutrophils Absolute 4 16 Thousands/µL      Immature Grans Absolute 0 01 Thousand/uL      Lymphocytes Absolute 1 13 Thousands/µL      Monocytes Absolute 0 38 Thousand/µL      Eosinophils Absolute 0 07 Thousand/µL      Basophils Absolute 0 02 Thousands/µL     Blood culture [[id number]] Collected:  07/16/19 2052    Lab Status:   In process Specimen:  Blood from Arm, Right Updated:  07/16/19 2057    Blood culture [[id number]] Collected:  07/16/19 2052    Lab Status: In process Specimen:  Blood from Arm, Left Updated:  07/16/19 2057                 CT chest abdomen pelvis w contrast   Final Result by Sonia Flynn MD (07/16 2221)         1  No acute cardiopulmonary process  2   Cholelithiasis  Significant gallbladder distention without evidence of acute cholecystitis  Right upper quadrant ultrasound may be helpful if there is persistent concern for acute cholecystitis  3   Significant amount stool throughout the colon may indicate constipation  No evidence of colitis, diverticulitis or bowel obstruction  Workstation performed: [id number]                    Procedures  Procedures       ED Course           Identification of Seniors at Risk      Most Recent Value   (ISAR) Identification of Seniors at Risk   Before the illness or injury that brought you to the Emergency, did you need someone to help you on a regular basis? 0 Filed at: 07/16/2019 1841   In the last 24 hours, have you needed more help than usual?  0 Filed at: 07/16/2019 1841   Have you been hospitalized for one or more nights during the past 6 months? 0 Filed at: 07/16/2019 1841   In general, do you see well?  0 Filed at: 07/16/2019 1841   In general, do you have serious problems with your memory? 0 Filed at: 07/16/2019 1841   Do you take more than three different medications every day?  0 Filed at: 07/16/2019 1841   ISAR Score  0 Filed at: 07/16/2019 1841                          MDM  Number of Diagnoses or Management Options  Cellulitis:   Diagnosis management comments: Patient is a 51-year-old female presents emergency department for evaluation regarding wound on her back has been present for last 4 months  The wound is extremely large and has not had any formal treatment  Patient given a dose of clindamycin in the emergency department  She did not demonstrate any evidence of sepsis or systemic illness    CT scan was ordered and did not show any evidence of necrotizing fasciitis  Patient was discussed with hospitalist service  Patient be admitted for further evaluation and IV antibiotics  Amount and/or Complexity of Data Reviewed  Clinical lab tests: ordered and reviewed  Tests in the radiology section of CPT®: ordered and reviewed  Discuss the patient with other providers: yes    Risk of Complications, Morbidity, and/or Mortality  Presenting problems: moderate  Diagnostic procedures: moderate  Management options: moderate    Patient Progress  Patient progress: stable      Disposition  Final diagnoses:   Cellulitis     Time reflects when diagnosis was documented in both MDM as applicable and the Disposition within this note     Time User Action Codes Description Comment    7/16/2019 10:59 PM Conrado Yanes Add [L03 90] Cellulitis       ED Disposition     ED Disposition Condition Date/Time Comment    Admit Stable Tue Jul 16, 2019 10:59 PM Case was discussed with ZHANE and the patient's admission status was agreed to be Admission Status: inpatient status to the service of Dr Conte Osvaldo  Follow-up Information    None         Current Discharge Medication List      CONTINUE these medications which have NOT CHANGED    Details   Ascorbic Acid (VITAMIN C) 100 MG tablet Take 100 mg by mouth daily      cholecalciferol (VITAMIN D3) 400 units tablet Take 400 Units by mouth daily      glucosamine-chondroitin 500-400 MG tablet Take 1 tablet by mouth 2 (two) times a day       Multiple Vitamins-Minerals (MULTIVITAMIN WITH MINERALS) tablet Take 1 tablet by mouth daily      vitamin B-12 (CYANOCOBALAMIN) 100 MCG tablet Take 100 mcg by mouth daily      vitamin E 100 UNIT capsule Take 100 Units by mouth daily           No discharge procedures on file      ED Provider  Electronically Signed by           Amber Crane PA-C  07/17/19 0326

## 2019-07-17 NOTE — ANESTHESIA PREPROCEDURE EVALUATION
Review of Systems/Medical History  Patient summary reviewed  Chart reviewed      Cardiovascular   Pulmonary  Negative pulmonary ROS        GI/Hepatic  Negative GI/hepatic ROS          Negative  ROS        Endo/Other  Negative endo/other ROS      GYN  Negative gynecology ROS          Hematology  Negative hematology ROS      Musculoskeletal  Negative musculoskeletal ROS        Neurology  Negative neurology ROS      Psychology   Negative psychology ROS              Physical Exam    Airway    Mallampati score: II  TM Distance: >3 FB  Neck ROM: full     Dental   No notable dental hx     Cardiovascular  Rhythm: regular, Rate: normal, Cardiovascular exam normal    Pulmonary  Pulmonary exam normal Breath sounds clear to auscultation,     Other Findings        Anesthesia Plan  ASA Score- 2     Anesthesia Type- IV sedation with anesthesia with ASA Monitors  Additional Monitors:   Airway Plan:         Plan Factors-    Induction- intravenous  Postoperative Plan- Plan for postoperative opioid use  Planned trial extubation    Informed Consent- Anesthetic plan and risks discussed with patient  I personally reviewed this patient with the CRNA  Discussed and agreed on the Anesthesia Plan with the CRNA  Moriah Hernandez

## 2019-07-17 NOTE — UTILIZATION REVIEW
Initial Clinical Review    Admission: Date/Time/Statement: 7/16/19 @ 2300     Orders Placed This Encounter   Procedures    Inpatient Admission     Standing Status:   Standing     Number of Occurrences:   1     Order Specific Question:   Admitting Physician     Answer:   Patricia Bertrand [[id number]]     Order Specific Question:   Level of Care     Answer:   Med Surg [[id number]]     Order Specific Question:   Estimated length of stay     Answer:   More than 2 Midnights     Order Specific Question:   Certification     Answer:   I certify that inpatient services are medically necessary for this patient for a duration of greater than two midnights  See H&P and MD Progress Notes for additional information about the patient's course of treatment  ED Arrival Information     Expected Arrival Acuity Means of Arrival Escorted By Service Admission Type    7/16/2019 7/16/2019 18:34 Urgent Walk-In Spouse General Medicine Urgent    Arrival Complaint    -        Chief Complaint   Patient presents with    Medical Problem     pt sent here from PCP for possible necrotizing fascitis     Assessment/Plan:  80 yo female to ED from home w/ wound to back , has doubled in size in the last 3-4 days   Admitted IP status w/ cellulitis , open wound to back for 4 months , doubled in size over the last 4 days   clindamycin given in ED , consult general sx and wound care             ED Triage Vitals   Temperature Pulse Respirations Blood Pressure SpO2   07/16/19 1839 07/16/19 1840 07/16/19 1840 07/16/19 1840 07/16/19 1840   98 6 °F (37 °C) 64 18 (!) 191/88 99 %      Temp Source Heart Rate Source Patient Position - Orthostatic VS BP Location FiO2 (%)   07/16/19 1839 07/16/19 2324 07/16/19 1840 07/16/19 1840 --   Oral Monitor Sitting Left arm       Pain Score       07/16/19 2324       No Pain        Wt Readings from Last 1 Encounters:   07/16/19 72 7 kg (160 lb 4 4 oz)     Additional Vital Signs:   07/17/19 0726  97 7 °F (36 5 °C)  73  18  133/79  97 %      07/17/19 0100  97 3 °F (36 3 °C)Abnormal   77    134/85  99 %       07/16/19 2324    71  18  177/81Abnormal   98 %  None (Room air)  Sitting   07/16/19 1840    64  18  191/88Abnormal   99 %  None (Room air)  Sitting       Pertinent Labs/Diagnostic Test Results:   7/16 CT abd -    1  No acute cardiopulmonary process  2   Cholelithiasis  Significant gallbladder distention without evidence of acute cholecystitis  Right upper quadrant ultrasound may be helpful if there is persistent concern for acute cholecystitis  3   Significant amount stool throughout the colon may indicate constipation  No evidence of colitis, diverticulitis or bowel obstruction    Results from last 7 days   Lab Units 07/17/19  0536 07/16/19  2052   WBC Thousand/uL 4 11* 5 77   HEMOGLOBIN g/dL 11 7 12 0   HEMATOCRIT % 35 3 36 1   PLATELETS Thousands/uL 277 288   NEUTROS ABS Thousands/µL  --  4 16     Results from last 7 days   Lab Units 07/17/19  0536 07/16/19 2052   SODIUM mmol/L 133* 133*   POTASSIUM mmol/L 4 2 4 4   CHLORIDE mmol/L 98* 96*   CO2 mmol/L 25 31   ANION GAP mmol/L 10 6   BUN mg/dL 9 11   CREATININE mg/dL 0 71 0 71   EGFR ml/min/1 73sq m 89 89   CALCIUM mg/dL 9 7 9 9     Results from last 7 days   Lab Units 07/16/19  2052   AST U/L 16   ALT U/L 22   ALK PHOS U/L 92   TOTAL PROTEIN g/dL 7 7   ALBUMIN g/dL 3 7   TOTAL BILIRUBIN mg/dL 0 40     Results from last 7 days   Lab Units 07/17/19  0536 07/16/19 2052   GLUCOSE RANDOM mg/dL 94 109     ED Treatment:   Medication Administration from 07/16/2019 1556 to 07/17/2019 0053       Date/Time Order Dose Route Action     07/16/2019 2145 clindamycin (CLEOCIN) IVPB (premix) 600 mg 600 mg Intravenous New Bag        Past Medical History:   Diagnosis Date    Seasonal allergies      Present on Admission:   Cellulitis   Wound, open, back      Admitting Diagnosis: Cellulitis [L03 90]  Known medical problems [Z78 9]  Age/Sex: 79 y o  female  Admission Orders:    Current Facility-Administered Medications:  acetaminophen 650 mg Oral Q6H PRN     aluminum-magnesium hydroxide-simethicone 30 mL Oral Q6H PRN     cholecalciferol 400 Units Oral Daily     clindamycin 600 mg Intravenous Q8H     vitamin B-12 100 mcg Oral Daily     enoxaparin 40 mg Subcutaneous Daily     magnesium hydroxide 30 mL Oral Daily PRN     melatonin 3 mg Oral HS PRN     multivitamin-minerals 1 tablet Oral Daily     nicotine 1 patch Transdermal Daily     ondansetron 4 mg Intravenous Q6H PRN     sodium chloride 75 mL/hr Intravenous Continuous       NPO   SCD  Up and OOB as guillermo     IP CONSULT TO ACUTE CARE SURGERY  IP CONSULT TO CASE MANAGEMENT  IP CONSULT TO Antoni Armenta Utilization Review Department  Phone: [phone]; Fax [phone]  [email or web address]  ATTENTION: Please call with any questions or concerns to [phone]  and carefully listen to the prompts so that you are directed to the right person  Send all requests for admission clinical reviews, approved or denied determinations and any other requests to fax [phone]   All voicemails are confidential

## 2019-07-17 NOTE — CONSULTS
Consultation - 67 [facility] 79 y o  female MRN: [id number]  Unit/Bed#: OR POOL Encounter: [id number]    ASSESSMENT:  53-year-old female with large nonhealing wound on back  - patient's lab results are within normal limits and she has been afebrile  - patient reports 2 prior episodes in last 10 years for which she did not receive medical treatment and self-treated home  - patient was sent to the emergency department after her PCP examined the wound    PLAN:  - excisional biopsy to be performed in the operating room today  - continue local wound care and daily dressing changes  - will continue to monitor vitals and lab results  - will await tissue pathology and cultures  - continue medical management as per primary team    _______________________________________________________________  Physician Requesting Consult: Ranjith Boyd MD    Additional consultants:  Wound care    Reason for Consult / Principal Problem:  Partial thickness wound to back; cellulitis    HPI: Oscar is a 79y o  year old female with no past medical history who presents to the emergency department after she was sent by her PCP due to a partial thickness wound on her back open unknown etiology  Patient reports that the wound has been there for about 4 months and has been treating with I diet and antibiotic ointment but has progressively gotten bigger  Patient reports having 2 previous similar in the past 10 years, however the wounds were not as large this one  The patient states that her 1st episode was about 10 years ago, and the 2nd one about 5 years ago, which both resolved with topical antibiotics and did not require a professional medical treatment  She denies having any inciting event echo to have started the wound, and states that age is SP a tensely appear has been getting bigger  Patient also reports having a rash over the arms and shoulders that is not typical for her     Patient denies starting any new medications, supplements, or topical creams  Additionally she denies experiencing any symptoms associated with the wound, and has been her normal state of health  Historical Information   Past Medical History:   Diagnosis Date    Seasonal allergies      Past Surgical History:   Procedure Laterality Date     SECTION       Social History   Social History     Substance and Sexual Activity   Alcohol Use Not Currently     Social History     Substance and Sexual Activity   Drug Use Not Currently     Social History     Tobacco Use   Smoking Status Current Every Day Smoker    Packs/day: 0 50    Types: Cigarettes   Smokeless Tobacco Never Used     Family History:   Family History   Problem Relation Age of Onset    Heart disease Mother     Cancer Father    }    Meds/Allergies   Home meds:   Prior to Admission medications    Medication Sig Start Date End Date Taking?  Authorizing Provider   Ascorbic Acid (VITAMIN C) 100 MG tablet Take 100 mg by mouth daily   Yes Historical Provider, MD   cholecalciferol (VITAMIN D3) 400 units tablet Take 400 Units by mouth daily   Yes Historical Provider, MD   glucosamine-chondroitin 500-400 MG tablet Take 1 tablet by mouth 2 (two) times a day    Yes Historical Provider, MD   Multiple Vitamins-Minerals (MULTIVITAMIN WITH MINERALS) tablet Take 1 tablet by mouth daily   Yes Historical Provider, MD   vitamin B-12 (CYANOCOBALAMIN) 100 MCG tablet Take 100 mcg by mouth daily   Yes Historical Provider, MD   vitamin E 100 UNIT capsule Take 100 Units by mouth daily   Yes Historical Provider, MD     Scheduled Meds:  Current Facility-Administered Medications:  [MAR Hold] acetaminophen 650 mg Oral Q6H PRN Tabitha Cooper PA-C    [facility] Hold] aluminum-magnesium hydroxide-simethicone 30 mL Oral Q6H PRN Tabitha Cooper PA-C    cholecalciferol 400 Units Oral Daily Tabitha Cooper PA-C    clindamycin 600 mg Intravenous Q8H Chidi Escobar Last Rate: 600 mg (19 1503) vitamin B-12 100 mcg Oral Daily Rachele Raglandinrich    [facility] Hold] enoxaparin 40 mg Subcutaneous Daily Rachele Ragland [facility] Hold] magnesium hydroxide 30 mL Oral Daily PRN Josias Griffin PA-C    melatonin 3 mg Oral HS PRN Josias Griffin PA-C    multivitamin-minerals 1 tablet Oral Daily Rachele Ragland [facility] Hold] nicotine 1 patch Transdermal Daily Rachele Ragland Sunday    [facility] Hold] ondansetron 4 mg Intravenous Q6H PRN Josias Griffin PA-C    sodium chloride 75 mL/hr Intravenous Continuous Rachele Ragland Last Rate: 75 mL/hr (07/17/19 7471)     Facility-Administered Medications Ordered in Other Encounters:  lactated ringers   Continuous PRN Aleshia Piety, CRNA    lidocaine (PF)   PRN Aleshia Piety, CRNA    midazolam   PRN Aleshia Piety, CRNA    propofol   Continuous PRN Yung Suarez V, CRNA Last Rate: Stopped (07/17/19 1419)   propofol  Intravenous PRN Yung Suarez V, CRNA      Continuous Infusions:  sodium chloride 75 mL/hr Last Rate: 75 mL/hr (07/17/19 0639)     PRN Meds:  Andrey Ortiz  [UPL Hold] acetaminophen    [MAR Hold] aluminum-magnesium hydroxide-simethicone    [MAR Hold] magnesium hydroxide    melatonin    [MAR Hold] ondansetron    ALLERGIES: No Known Allergies    Review of Systems:  General: negative  Cardiovascular: no chest pain or dyspnea on exertion  Respiratory: no cough, shortness of breath, or wheezing  Gastrointestinal: no abdominal pain, change in bowel habits, or black or bloody stools  Genitourinary: no dysuria, trouble voiding, or hematuria  Musculoskeletal: negative  Neurological: no TIA or stroke symptoms  Hematological and Lymphatic: negative  Dermatological : Large 1 on her right back and posterior lateral side that has been progressing over the last 4 months, patient also noticed a rash over her triceps shoulders and upper back that is she 1st noticed 4 days ago          Psychological: negative  Ophthalmic: negative  ENT: negative      Objective   Vitals:  Blood pressure 168/78, pulse 74, temperature 98 3 °F (36 8 °C), temperature source Temporal, resp  rate 19, height 5' 4" (1 626 m), weight 72 7 kg (160 lb 4 4 oz), SpO2 99 %  Body mass index is 27 51 kg/m²  SpO2: SpO2: 99 %    I/Os:  I/O     None          Invasive Lines/Tubes:  Invasive Devices     Peripheral Intravenous Line            Peripheral IV 07/16/19 Right Antecubital less than 1 day                Physical Exam  General appearance: alert, appears stated age and cooperative  Head: Normocephalic, without obvious abnormality, atraumatic  Eyes: conjunctivae/corneas clear  PERRL, EOM's intact  Throat: lips, mucosa, and tongue normal; teeth and gums normal  Neck: no adenopathy, supple, symmetrical, trachea midline and thyroid not enlarged, symmetric, no tenderness/mass/nodules  Back: symmetric, no curvature  ROM normal  No CVA tenderness  Large were and with irregular borders and erythema at the perimeter of the wound, the wound had no malodor or purulent discharge and was irregular            Lungs: clear to auscultation bilaterally  Chest wall: no tenderness  Heart[de-identified] regular rate and rhythm, S1, S2 normal, no murmur, click, rub or gallop  Abdomen: soft, non-tender; bowel sounds normal; no masses,  no organomegaly  Genitalia: normal  Rectal: deferred  Extremities: Homans sign is negative, no sign of DVT and extremities normal, atraumatic, no cyanosis or edema  Skin: Skin color, texture, turgor normal   Please see above for back for description wound  Patient has rash over shoulders triceps and upper back     Neurologic: Grossly normal    Lab Results and Cultures:   CBC: Results from last 7 days   Lab Units 07/17/19  0536 07/16/19 2052   WBC Thousand/uL 4 11* 5 77   HEMOGLOBIN g/dL 11 7 12 0   HEMATOCRIT % 35 3 36 1   PLATELETS Thousands/uL 277 288     BMP/CMP:  Results from last 7 days   Lab Units 07/17/19  0536 07/16/19 2052   POTASSIUM mmol/L 4 2 4 4   CHLORIDE mmol/L 98* 96*   CO2 mmol/L 25 31   BUN mg/dL 9 11   CREATININE mg/dL 0 71 0 71   CALCIUM mg/dL 9 7 9 9     Coags:     Lipid panel:     HgbA1c: No results found for: HGBA1C    Urinalysis: No results found for: COLORU, CLARITYU, SPECGRAV, PHUR, LEUKOCYTESUR, NITRITE, PROTEINUA, GLUCOSEU, KETONESU, BILIRUBINUR, BLOODU,   Urine Culture: No results found for: URINECX  Wound Culure: No results found for: WOUNDCULT  Blood Culture: No results found for: BLOODCX    Imaging Studies: I have personally reviewed pertinent reports  EKG, Pathology, and Other Studies: I have personally reviewed pertinent reports      VTE Prophylaxis: Enoxaparin (Lovenox)     Code Status: Level 1 - Full Code  Advance Directive and Living Will:      Power of :    POLST:      Counseling / Coordination of Care  None       [place], [place]  7/17/2019

## 2019-07-17 NOTE — OP NOTE
OPERATIVE REPORT  PATIENT NAME: Edith Sherwood    :  1950  MRN: [id number]  Pt Location: [facility] OR ROOM 03    SURGERY DATE: 2019    Surgeon(s) and Role:     * Maria Esther Bolden MD - Primary     * Marcela Rodas PA-C - Assisting    Preop Diagnosis:  Large Nonhealing wound from back, rule out malignancy    Post-Op Diagnosis Codes:  Large nonhealing wound from back, rule out malignancy    Procedure: Incisional biopsy of nonhealing wound from back    Specimen(s): Watch incisional biopsy nonhealing wound from back    Estimated Blood Loss:   Minimal    Drains:  None    Anesthesia Type:   IV Sedation with Anesthesia    Operative Indications:  Nonhealing wound from back    Operative Findings:  Large chronic nonhealing wound from the back    Complications:   None    Procedure and Technique:  Identified in the patient was placed in the operating table in a left lateral decubitus  The area the back was prepped and draped in a sterile usual fashion with Betadine solution  After adequate IV sedation, time-out was called the patient was identified as was surgical site  1% lidocaine was infiltrated in the lower part of the nonhealing wound, a wedge biopsy was performed with a scalpel including healthy skin, taken down through the subcutaneous tissue with scalpel and cautery dissection  The specimen sent to pathology  Hemostasis was accomplished with cautery  The wound was redressed with sterile dressings  At the end of the case instrument, needles, and sponges counts were correct  The patient tolerated the procedure well        I was present for the entire procedure, A qualified resident physician was not available and A physician assistant was required during the procedure for retraction tissue handling,dissection and suturing    Patient Disposition:  PACU  and hemodynamically stable    SIGNATURE: Maria Esther Bolden MD  DATE: 2019  TIME: 2:14 PM

## 2019-07-17 NOTE — ED NOTES
1  CC- skin lesion/ wound  2  Orientation status- alert and oriented   3  Abnormal labs, tests, vitals- none   4  Important drips- IV abx   5  Time of last narcotics- n/a  6  IV lines, drains, tubes- 20 R a/c  7  Isolation status- none  8  Skin check- wound on the back, dry dressing present   9  Ambulation- independent   10   ED RN phone number- [phone]     Rubi Banuelos RN  07/17/19 4048

## 2019-07-17 NOTE — DISCHARGE INSTR - OTHER ORDERS
1  Apply hydraguard to b/l heels, buttocks and sacrum BID and PRN for prevention and protection  2  Apply skin nourishing cream the entire skin daily for moisture  3  Turn and reposition patient every  2 hours   4  Elevate heels off of bed with pillows to offload pressure   5  Apply EHOB waffle cushion to chair when OOB, if able  6  Cleanse R back wound with NSS, pat dry, apply xeroform gauze to wound bed and cover with ABD pads  Secure with minimal tape  Change daily and as needed for soilage/dislodgment

## 2019-07-17 NOTE — H&P
H&P- Charles Villanueva 1950, 79 y o  female MRN: [id number]    Unit/Bed#: -01 Encounter: [id number]    Primary Care Provider: Alexx Coles   Date and time admitted to hospital: 7/16/2019  6:54 PM        * Cellulitis  Assessment & Plan  · Open wound to back to 4 months  States has gradually been getting worse, but has doubled in size over the past 4 days  Seen by PCP during annual physical today and sent to ED  States is recurrent for decades, but has always healed with home treatment in the past  Reports has been treating at home with betadine and neosporin currently  · Afebrile  No leukocytosis  · Received initial dose of Clindamycin in ED  Will continue  · Gen-surg consult  Large partial thickness wound  · Wound care consult      Wound, open, back  Assessment & Plan  · Assessment and plan as above  Partial thickness wound  VTE Prophylaxis: Enoxaparin (Lovenox)  / sequential compression device   Code Status: Level 1 Full Code  POLST: There is no POLST form on file for this patient (pre-hospital)  Discussion with family: NA    Anticipated Length of Stay:  Patient will be admitted on an Inpatient basis with an anticipated length of stay of  Greater than 2 midnights  Justification for Hospital Stay: See AP above    Total Time for Visit, including Counseling / Coordination of Care: 20 minutes  Greater than 50% of this total time spent on direct patient counseling and coordination of care  Chief Complaint:   Sent by PCP for back wound    History of Present Illness:    Charles Villanueva is a 79 y o  female with no significant PMH who presents with wound to back x 4 months  States "It has been coming and going for decades  Usually I treat it at home with antibiotics and it goes away " Reports has doubled in size over past 3-4 days  Has been treating at home with betadine solution and neosporin   Was at PCP office on day of admission for routine physical and mentioned to him after which she was directed to come to ED for further eval  Denies fever/ chills  Denies pruritis  Only has pain when "rubs it the wrong way '    Review of Systems:    Review of Systems   Constitutional: Negative for activity change, appetite change, chills and fever  HENT: Negative for congestion, ear pain, sinus pressure and sore throat  Eyes: Negative for pain and visual disturbance  Respiratory: Negative for cough, shortness of breath and wheezing  Cardiovascular: Negative for chest pain, palpitations and leg swelling  Gastrointestinal: Negative for abdominal pain, constipation, diarrhea, nausea and vomiting  Genitourinary: Negative for difficulty urinating, dysuria, frequency and urgency  Musculoskeletal: Negative for gait problem, neck pain and neck stiffness  Neurological: Negative for dizziness, syncope and headaches  Past Medical and Surgical History:     Past Medical History:   Diagnosis Date    Seasonal allergies        Past Surgical History:   Procedure Laterality Date     SECTION         Meds/Allergies:    Prior to Admission medications    Medication Sig Start Date End Date Taking? Authorizing Provider   Ascorbic Acid (VITAMIN C) 100 MG tablet Take 100 mg by mouth daily   Yes Historical Provider, MD   cholecalciferol (VITAMIN D3) 400 units tablet Take 400 Units by mouth daily   Yes Historical Provider, MD   glucosamine-chondroitin 500-400 MG tablet Take 1 tablet by mouth 2 (two) times a day    Yes Historical Provider, MD   Multiple Vitamins-Minerals (MULTIVITAMIN WITH MINERALS) tablet Take 1 tablet by mouth daily   Yes Historical Provider, MD   vitamin B-12 (CYANOCOBALAMIN) 100 MCG tablet Take 100 mcg by mouth daily   Yes Historical Provider, MD   vitamin E 100 UNIT capsule Take 100 Units by mouth daily   Yes Historical Provider, MD     I have reviewed home medications with patient personally      Allergies: No Known Allergies    Social History:     Marital Status: /Civil Union Patient Pre-hospital Living Situation: Lives at home with   Patient Pre-hospital Level of Mobility: ambulatory w/o assistive device  Patient Pre-hospital Diet Restrictions: None  Substance Use History:   Social History     Substance and Sexual Activity   Alcohol Use Not Currently     Social History     Tobacco Use   Smoking Status Current Every Day Smoker    Packs/day: 0 50    Types: Cigarettes   Smokeless Tobacco Never Used     Social History     Substance and Sexual Activity   Drug Use Not Currently       Family History:    Family History   Problem Relation Age of Onset    Heart disease Mother     Cancer Father        Physical Exam:     Vitals:   Blood Pressure: 134/85 (07/17/19 0100)  Pulse: 77 (07/17/19 0100)  Temperature: (!) 97 3 °F (36 3 °C) (07/17/19 0100)  Temp Source: Oral (07/16/19 1839)  Respirations: 18 (07/16/19 2324)  Height: 5' 4" (162 6 cm) (07/16/19 2324)  Weight - Scale: 72 7 kg (160 lb 4 4 oz) (07/16/19 2324)  SpO2: 99 % (07/17/19 0100)    Physical Exam   Constitutional: She is oriented to person, place, and time  She appears well-developed and well-nourished  HENT:   Head: Normocephalic and atraumatic  Eyes: Pupils are equal, round, and reactive to light  Conjunctivae are normal    Neck: Normal range of motion  Neck supple  Cardiovascular: Normal rate, regular rhythm, normal heart sounds and intact distal pulses  Exam reveals no gallop and no friction rub  No murmur heard  Pulmonary/Chest: Effort normal and breath sounds normal  No respiratory distress  She has no wheezes  She has no rales  Abdominal: Soft  Bowel sounds are normal  There is no tenderness  There is no rebound and no guarding  Musculoskeletal: Normal range of motion  She exhibits no edema or tenderness  Neurological: She is alert and oriented to person, place, and time  Skin: Skin is warm  Large partial-thickness wound with beefy red wound bed, and surrounding erythema  Bloody drainage  Additional Data:     Lab Results: I have personally reviewed pertinent reports  Results from last 7 days   Lab Units 07/16/19 2052   WBC Thousand/uL 5 77   HEMOGLOBIN g/dL 12 0   HEMATOCRIT % 36 1   PLATELETS Thousands/uL 288   NEUTROS PCT % 72   LYMPHS PCT % 20   MONOS PCT % 7   EOS PCT % 1     Results from last 7 days   Lab Units 07/16/19 2052   SODIUM mmol/L 133*   POTASSIUM mmol/L 4 4   CHLORIDE mmol/L 96*   CO2 mmol/L 31   BUN mg/dL 11   CREATININE mg/dL 0 71   ANION GAP mmol/L 6   CALCIUM mg/dL 9 9   ALBUMIN g/dL 3 7   TOTAL BILIRUBIN mg/dL 0 40   ALK PHOS U/L 92   ALT U/L 22   AST U/L 16   GLUCOSE RANDOM mg/dL 109                       Imaging: Reviewed    CT chest abdomen pelvis w contrast   Final Result by Edison Dawn MD (07/16 2221)         1  No acute cardiopulmonary process  2   Cholelithiasis  Significant gallbladder distention without evidence of acute cholecystitis  Right upper quadrant ultrasound may be helpful if there is persistent concern for acute cholecystitis  3   Significant amount stool throughout the colon may indicate constipation  No evidence of colitis, diverticulitis or bowel obstruction  Workstation performed: [id number]             EKG, Pathology, and Other Studies Reviewed on Admission:   · EKG: NA    Allscripts / Epic Records Reviewed: Yes     ** Please Note: This note has been constructed using a voice recognition system   **

## 2019-07-17 NOTE — CONSULTS
Progress Note - [address] 79 y o  female MRN: [id number]  Unit/Bed#: -01 Encounter: [id number]      Assessment:  Wound care consulted for assessment of wound noted to the R back  Patient seen in bed, agreeable with assessment  Acute care surgery also at the bedside for assessment of the wound  Patient is alert and oriented x 4, continent and ambulatory at baseline  Denies having any other wounds bedsides the one on her back - declined full skin assessment  Will recommend a general preventative skin care plan  Patient reports a long history of this wound  States the wound first occurred about 10 years ago, then again 5 years and the most recent occurrence within the last few weeks - however patient states, "I don't know maybe a few weeks maybe a few months - who knows"  Reports the wound is painful at times, but is not particularly painful currently  Has been using antibiotic ointment and betadine at home, and reports the wound has healed with these cares at home before  Denies seeing a dermatologist of having a biopsy to the wound   at bedside reports that he has never seen the wound  Wound of unclear etiology to R posterior back - atypical  Wound is shaped like a right triangle  Appears chronic and is full thickness  Denies trauma to area  Patient did report that it typically starts as a "blister that pops open"  Wound bed is irregular cobblestone like 60% thin yellow tissue, 40% scattered granular appearing red and pink tissue  Some aspects appear hypergranular (+0 1cm above the wound bed)- however the wound does not have a lot of drainage on assessment / on old dressing  Edges are rolled and attached  No maceration  Danilo-wound with hyperpigmented edges / and redness - redness may be from chronic use of antibiotic ointments  No increased warmth or pain to the danilo-wound  Patient states, "I really like that yellow gauze, its soothing"  Agree with use of xeroform gauze   Could also consider using dermagran gauze - both dressing will reduce trauma to the wound bed with dressing removal     Educated patient on the importance of frequent offloading of pressure via turning, repositioning and weight-shifting  Verbalized understanding of plan of care  No induration, fluctuance, odor, redness, or purulence noted to the above noted wounds  New dressings applied  Patient tolerated well  Primary nurse aware of plan of care  See flow sheets for more detailed assessment findings  Will follow along  Plan is for surgery to take patient to the OR for biopsy  Would also consider dermatology consult as an outpatient  Plan:   1  Apply hydraguard to b/l heels, buttocks and sacrum BID and PRN for prevention and protection  2  Apply skin nourishing cream the entire skin daily for moisture  3  Turn and reposition patient every  2 hours   4  Elevate heels off of bed with pillows to offload pressure   5  Apply EHOB waffle cushion to chair when OOB, if able  6  Cleanse R back wound with NSS, pat dry, apply xeroform gauze to wound bed and cover with ABD pads  Secure with minimal tape  Change daily and as needed for soilage/dislodgment  7  Wound care will follow along with patient weekly, please call with questions and concerns    Objective:    Vitals: Blood pressure 133/79, pulse 73, temperature 97 7 °F (36 5 °C), resp  rate 18, height 5' 4" (1 626 m), weight 72 7 kg (160 lb 4 4 oz), SpO2 97 %  ,Body mass index is 27 51 kg/m²  Wound 07/17/19 Other (Comment) Back Mid (Active)   Wound Image   7/17/2019 10:19 AM   Wound Description Yellow;Slough;Granulation tissue; Beefy red 7/17/2019 10:19 AM   [place]-wound Assessment Hyperpigmented;Erythema 7/17/2019 10:19 AM   Wound Length (cm) 18 cm 7/17/2019 10:19 AM   Wound Width (cm) 19 cm 7/17/2019 10:19 AM   Wound Depth (cm) 0 3 7/17/2019 10:19 AM   Calculated Wound Area (cm^2) 342 cm^2 7/17/2019 10:19 AM   Calculated Wound Volume (cm^3) 102 6 cm^3 7/17/2019 10:19 AM   Tunneling 0 cm 7/17/2019 10:19 AM   Tunneling in depth located at 0 7/17/2019 10:19 AM   Undermining 0 7/17/2019 10:19 AM   Undermining is depth extending from 0 7/17/2019 10:19 AM   Closure None 7/17/2019 10:19 AM   Drainage Amount Small 7/17/2019 10:19 AM   Drainage Description Serosanguineous 7/17/2019 10:19 AM   Non-staged Wound Description Full thickness 7/17/2019 10:19 AM   Treatments Cleansed;Irrigation with NSS;Site care 7/17/2019 10:19 AM   Dressing ABD;Xeroform 7/17/2019 10:19 AM   Wound packed? No 7/17/2019 10:19 AM   Packing- # removed 0 7/17/2019 10:19 AM   Packing- # inserted 0 7/17/2019 10:19 AM   Dressing Changed New 7/17/2019 10:19 AM   Patient Tolerance Tolerated well 7/17/2019 10:19 AM   Dressing Status Clean;Dry; Intact 7/17/2019 10:19 AM     Recommendations written as orders  AVS updated    Dilan Yuan RN BSN CWON

## 2019-07-17 NOTE — PLAN OF CARE
Problem: PAIN - ADULT  Goal: Verbalizes/displays adequate comfort level or baseline comfort level  Description  Interventions:  - Encourage patient to monitor pain and request assistance  - Assess pain using appropriate pain scale  - Administer analgesics based on type and severity of pain and evaluate response  - Implement non-pharmacological measures as appropriate and evaluate response  - Consider cultural and social influences on pain and pain management  - Notify physician/advanced practitioner if interventions unsuccessful or patient reports new pain  Outcome: Progressing     Problem: INFECTION - ADULT  Goal: Absence or prevention of progression during hospitalization  Description  INTERVENTIONS:  - Assess and monitor for signs and symptoms of infection  - Monitor lab/diagnostic results  - Monitor all insertion sites, i e  indwelling lines, tubes, and drains  - Monitor endotracheal (as able) and nasal secretions for changes in amount and color  - [place] appropriate cooling/warming therapies per order  - Administer medications as ordered  - Instruct and encourage patient and family to use good hand hygiene technique  - Identify and instruct in appropriate isolation precautions for identified infection/condition  Outcome: Progressing  Goal: Absence of fever/infection during neutropenic period  Description  INTERVENTIONS:  - Monitor WBC  - Implement neutropenic guidelines  Outcome: Progressing     Problem: SAFETY ADULT  Goal: Patient will remain free of falls  Description  INTERVENTIONS:  - Assess patient frequently for physical needs  -  Identify cognitive and physical deficits and behaviors that affect risk of falls    -  [place] fall precautions as indicated by assessment   - Educate patient/family on patient safety including physical limitations  - Instruct patient to call for assistance with activity based on assessment  - Modify environment to reduce risk of injury  - Consider OT/PT consult to assist with strengthening/mobility  Outcome: Progressing  Goal: Maintain or return to baseline ADL function  Description  INTERVENTIONS:  -  Assess patient's ability to carry out ADLs; assess patient's baseline for ADL function and identify physical deficits which impact ability to perform ADLs (bathing, care of mouth/teeth, toileting, grooming, dressing, etc )  - Assess/evaluate cause of self-care deficits   - Assess range of motion  - Assess patient's mobility; develop plan if impaired  - Assess patient's need for assistive devices and provide as appropriate  - Encourage maximum independence but intervene and supervise when necessary  ¯ Involve family in performance of ADLs  ¯ Assess for home care needs following discharge   ¯ Request OT consult to assist with ADL evaluation and planning for discharge  ¯ Provide patient education as appropriate  Outcome: Progressing  Goal: Maintain or return mobility status to optimal level  Description  INTERVENTIONS:  - Assess patient's baseline mobility status (ambulation, transfers, stairs, etc )    - Identify cognitive and physical deficits and behaviors that affect mobility  - Identify mobility aids required to assist with transfers and/or ambulation (gait belt, sit-to-stand, lift, walker, cane, etc )  - [place] fall precautions as indicated by assessment  - Record patient progress and toleration of activity level on Mobility SBAR; progress patient to next Phase/Stage  - Instruct patient to call for assistance with activity based on assessment  - Request Rehabilitation consult to assist with strengthening/weightbearing, etc   Outcome: Progressing     Problem: DISCHARGE PLANNING  Goal: Discharge to home or other facility with appropriate resources  Description  INTERVENTIONS:  - Identify barriers to discharge w/patient and caregiver  - Arrange for needed discharge resources and transportation as appropriate  - Identify discharge learning needs (meds, wound care, etc )  - Arrange for interpretive services to assist at discharge as needed  - Refer to Case Management Department for coordinating discharge planning if the patient needs post-hospital services based on physician/advanced practitioner order or complex needs related to functional status, cognitive ability, or social support system  Outcome: Progressing     Problem: Knowledge Deficit  Goal: Patient/family/caregiver demonstrates understanding of disease process, treatment plan, medications, and discharge instructions  Description  Complete learning assessment and assess knowledge base    Interventions:  - Provide teaching at level of understanding  - Provide teaching via preferred learning methods  Outcome: Progressing     Problem: SKIN/TISSUE INTEGRITY - ADULT  Goal: Skin integrity remains intact  Description  INTERVENTIONS  - Identify patients at risk for skin breakdown  - Assess and monitor skin integrity  - Assess and monitor nutrition and hydration status  - Monitor labs (i e  albumin)  - Assess for incontinence   - Turn and reposition patient  - Assist with mobility/ambulation  - Relieve pressure over bony prominences  - Avoid friction and shearing  - Provide appropriate hygiene as needed including keeping skin clean and dry  - Evaluate need for skin moisturizer/barrier cream  - Collaborate with interdisciplinary team (i e  Nutrition, Rehabilitation, etc )   - Patient/family teaching  Outcome: Progressing  Goal: Incision(s), wounds(s) or drain site(s) healing without S/S of infection  Description  INTERVENTIONS  - Assess and document risk factors for skin impairment   - Assess and document dressing, incision, wound bed, drain sites and surrounding tissue  - Initiate Nutrition services consult and/or wound management as needed  Outcome: Progressing  Goal: Oral mucous membranes remain intact  Description  INTERVENTIONS  - Assess oral mucosa and hygiene practices  - Implement preventative oral hygiene regimen  - Implement oral medicated treatments as ordered  - Initiate Nutrition services referral as needed  Outcome: Progressing     Problem: Nutrition/Hydration-ADULT  Goal: Nutrient/Hydration intake appropriate for improving, restoring or maintaining nutritional needs  Description  Monitor and assess patient's nutrition/hydration status for malnutrition (ex- brittle hair, bruises, dry skin, pale skin and conjunctiva, muscle wasting, smooth red tongue, and disorientation)  Collaborate with interdisciplinary team and initiate plan and interventions as ordered  Monitor patient's weight and dietary intake as ordered or per policy  Utilize nutrition screening tool and intervene per policy  Determine patient's food preferences and provide high-protein, high-caloric foods as appropriate       INTERVENTIONS:  - Monitor oral intake, urinary output, labs, and treatment plans  - Assess nutrition and hydration status and recommend course of action  - Evaluate amount of meals eaten  - Assist patient with eating if necessary   - Allow adequate time for meals  - Recommend/ encourage appropriate diets, oral nutritional supplements, and vitamin/mineral supplements  - Order, calculate, and assess calorie counts as needed  - Recommend, monitor, and adjust tube feedings and TPN/PPN based on assessed needs  - Assess need for intravenous fluids  - Provide specific nutrition/hydration education as appropriate  - Include patient/family/caregiver in decisions related to nutrition  Outcome: Progressing

## 2019-07-17 NOTE — ASSESSMENT & PLAN NOTE
· Open wound to back to 4 months  States has gradually been getting worse, but has doubled in size over the past 4 days  Seen by PCP during annual physical today and sent to ED  States is recurrent for decades, but has always healed with home treatment in the past  Reports has been treating at home with betadine and neosporin currently  · Afebrile  No leukocytosis  · Received initial dose of Clindamycin in ED  Will continue  · Gen-surg consult  Large partial thickness wound     · Wound care consult

## 2019-07-18 PROBLEM — L03.90 CELLULITIS: Status: RESOLVED | Noted: 2019-07-16 | Resolved: 2019-07-18

## 2019-07-18 PROCEDURE — 99221 1ST HOSP IP/OBS SF/LOW 40: CPT | Performed by: DERMATOLOGY

## 2019-07-18 PROCEDURE — 99024 POSTOP FOLLOW-UP VISIT: CPT | Performed by: SURGERY

## 2019-07-18 PROCEDURE — 99239 HOSP IP/OBS DSCHRG MGMT >30: CPT | Performed by: INTERNAL MEDICINE

## 2019-07-18 RX ADMIN — SODIUM CHLORIDE 75 ML/HR: 0.9 INJECTION, SOLUTION INTRAVENOUS at 02:57

## 2019-07-18 RX ADMIN — VITAM B12 100 MCG: 100 TAB at 09:12

## 2019-07-18 RX ADMIN — CLINDAMYCIN PHOSPHATE 600 MG: 600 INJECTION, SOLUTION INTRAVENOUS at 07:32

## 2019-07-18 RX ADMIN — Medication 1 TABLET: at 09:12

## 2019-07-18 RX ADMIN — CHOLECALCIFEROL TAB 10 MCG (400 UNIT) 400 UNITS: 10 TAB at 09:12

## 2019-07-18 NOTE — CONSULTS
Consultation - JC DARLEEN  [facility] 79 y o  female MRN: [id number]    Unit/Bed#: -01 Encounter: [id number]      Assessment/Plan     Assessment:  Giant basal cell carcinoma  Photo dermatitis question polymorphous light eruption  Plan:  1  Await  results of biopsy would consider systemic workup to rule out metastatic disease with lesion this large  Plastic surgery consultation for removal   Photo dermatitis will go ahead treat with topical steroid if this process continues would re-evaluate as an outpatient and obtain biopsy  Fluocinonide cream  History of Present Illness     [facility] 79y o  [facility] female who presents with large ulcerated lesion that has been present for greater than 10 years patient notes that this the lesion has been present and healing and recurring over many years  Patient never sought medical attention for this process over the last 4 months appears to be breaking down more has been more problem with seen by her family doctor and advised to come to the emergency room  In addition patient notes a rash that is been present on her shoulders and arms for about 3 days never had this process previously as far as she recalls has been working out in the garden      Consults    Review of Systems    Historical Information   Past Medical History:   Diagnosis Date    Seasonal allergies      Past Surgical History:   Procedure Laterality Date     SECTION      MASS EXCISION N/A 2019    Procedure: EXCISION  BIOPSY LESION/MASS BACK;  Surgeon: Dorina Woody MD;  Location: [facility];  Service: General     Social History   Social History     Substance and Sexual Activity   Alcohol Use Not Currently     Social History     Substance and Sexual Activity   Drug Use Not Currently     Social History     Tobacco Use   Smoking Status Current Every Day Smoker    Packs/day: 0 50    Types: Cigarettes   Smokeless Tobacco Never Used     Family History:   Family History   Problem Relation Age of Onset    Heart disease Mother     Cancer Father        Meds/Allergies   current meds:   Current Facility-Administered Medications   Medication Dose Route Frequency    acetaminophen (TYLENOL) tablet 650 mg  650 mg Oral Q6H PRN    aluminum-magnesium hydroxide-simethicone (MYLANTA) 200-200-20 mg/5 mL oral suspension 30 mL  30 mL Oral Q6H PRN    cholecalciferol (VITAMIN D3) tablet 400 Units  400 Units Oral Daily    clindamycin (CLEOCIN) IVPB (premix) 600 mg  600 mg Intravenous Q8H    cyanocobalamin (VITAMIN B-12) tablet 100 mcg  100 mcg Oral Daily    enoxaparin (LOVENOX) subcutaneous injection 40 mg  40 mg Subcutaneous Daily    magnesium hydroxide (MILK OF MAGNESIA) 400 mg/5 mL oral suspension 30 mL  30 mL Oral Daily PRN    melatonin tablet 3 mg  3 mg Oral HS PRN    multivitamin-minerals (CENTRUM) tablet 1 tablet  1 tablet Oral Daily    nicotine (NICODERM CQ) 14 mg/24hr TD 24 hr patch 1 patch  1 patch Transdermal Daily    ondansetron (ZOFRAN) injection 4 mg  4 mg Intravenous Q6H PRN    oxyCODONE-acetaminophen (PERCOCET) 5-325 mg per tablet 1 tablet  1 tablet Oral Q4H PRN    oxyCODONE-acetaminophen (PERCOCET) 5-325 mg per tablet 2 tablet  2 tablet Oral Q6H PRN    sodium chloride 0 9 % infusion  75 mL/hr Intravenous Continuous     No Known Allergies    Objective   Vitals: Blood pressure 145/63, pulse 73, temperature 98 °F (36 7 °C), temperature source Oral, resp  rate 18, height 5' 4" (1 626 m), weight 72 7 kg (160 lb 4 4 oz), SpO2 98 %  Physical Exam  Large ulcerated lesion greater than 10 cm in size with verrucous appearance with undermining noted in the periphery erythema scaling patches noted on the upper arm and forearms other evidence of rash elsewhere  Lab Results: I have personally reviewed pertinent reports  Pathology: I have personally reviewed pertinent reports  Counseling / Coordination of Care  Total floor / unit time spent today 30 minutes   Greater than 50% of total time was spent with the patient and / or family counseling and / or coordination of care

## 2019-07-18 NOTE — DISCHARGE INSTRUCTIONS
Continue adaptic and dry dressing to back  Change daily  Ok to shower daily and get it wet  Let it air dry and then apply dressing  Follow up with Dr Sherren Crew surgery for consultation

## 2019-07-18 NOTE — PROGRESS NOTES
Progress Note - 67 [facility] 79 y o  female MRN: [id number]  Unit/Bed#: -01 Encounter: [id number]    Assessment/Plan  S/p Bx of back wound  -adaptic and dry dressings daily  -consult Dermatology  -consult Plastics as outpatient for possible closure  -ok to shower and let air dry then apply dressing       Chief Complaint: I did ok yesterday  I still have that itchy rash on my upper back and arms  It came along with the large back wound  Objective Directed Exam:  Pt ambulating   Lungs clear  RRR  BACK:  Surgical dressing saturated with s/s drainage  abd soft NT, NBS  No calf tenderness    Blood pressure 145/63, pulse 73, temperature 98 °F (36 7 °C), temperature source Oral, resp  rate 18, height 5' 4" (1 626 m), weight 72 7 kg (160 lb 4 4 oz), SpO2 98 %  ,Body mass index is 27 51 kg/m²  Intake/Output Summary (Last 24 hours) at 7/18/2019 1209  Last data filed at 7/17/2019 1930  Gross per 24 hour   Intake 280 ml   Output    Net 280 ml       Invasive Devices     Peripheral Intravenous Line            Peripheral IV 07/17/19 Distal;Dorsal (posterior); Right Forearm less than 1 day                Physical Exam:   General Appearance:    Alert and orientated x 3, cooperative, no distress   Lungs:     Clear to auscultation bilaterally, respirations unlabored    Heart:    Regular rate and rhythm   Abdomen:     As above          Extremities:   Extremities normal,  no cyanosis or edema   Pulses:   2+ and symmetric all extremities, no calf tenderness   Skin:   Skin color, texture, turgor normal, no rashes or lesions   Neurologic:   CNII-XII intact, normal strength, sensation and reflexes     Throughout, affect appropriate                           Labs:   CBC with diff:   Lab Results   Component Value Date    WBC 4 11 (L) 07/17/2019    HGB 11 7 07/17/2019    HCT 35 3 07/17/2019    MCV 86 07/17/2019     07/17/2019    MCH 28 5 07/17/2019    MCHC 33 1 07/17/2019    RDW 12 7 07/17/2019 MPV 9 1 07/17/2019    NRBC 0 07/16/2019   ,   BMP/CMP:  Lab Results   Component Value Date    K 4 2 07/17/2019    CL 98 (L) 07/17/2019    CO2 25 07/17/2019    BUN 9 07/17/2019    CREATININE 0 71 07/17/2019    CALCIUM 9 7 07/17/2019    AST 16 07/16/2019    ALT 22 07/16/2019    ALKPHOS 92 07/16/2019    EGFR 89 07/17/2019   ,   Lipid Panel: No results found for: CHOL,   Coags: No results found for: PT, PTT, INR,     Blood Culture:   Lab Results   Component Value Date    BLOODCX No Growth at 24 hrs  07/16/2019    BLOODCX No Growth at 24 hrs  07/16/2019   ,   Urinalysis: No results found for: Natanael Campi, SPECGRAV, PHUR, LEUKOCYTESUR, NITRITE, PROTEINUA, GLUCOSEU, KETONESU, BILIRUBINUR, BLOODU,   Urine Culture: No results found for: URINECX,   Wound Culure: No results found for: WOUNDCULT      Imaging: Ct Chest Abdomen Pelvis W Contrast    Result Date: 7/16/2019  Impression: 1  No acute cardiopulmonary process  2   Cholelithiasis  Significant gallbladder distention without evidence of acute cholecystitis  Right upper quadrant ultrasound may be helpful if there is persistent concern for acute cholecystitis  3   Significant amount stool throughout the colon may indicate constipation  No evidence of colitis, diverticulitis or bowel obstruction   Workstation performed: [id number]         Michele Kuhn PA-C  7/18/2019

## 2019-07-18 NOTE — PROGRESS NOTES
Patient seen and examined postoperative leaving in follow-up to early a m  Admission  Patient comfortable in without distress  Patient admitted for large back wound which been expanding  Taken to the OR for biopsy  Agree with current antibiotic regimen  Reviewed a piece plan of care  Vital signs stable    Generally well-developed reasonably nourished female no acute distress normocephalic atraumatic pupils equal round reactive to light extraocular muscles intact mucous membranes moist neck is supple there is no JVD no lymph nodes no carotid bruits chest is decreased but clear to auscultation is no rhonchi rales or wheezes  Cardiovascular regular rate rhythm positive S1 and S2 no S3-S4 murmur or gallop  Abdomen soft nontender nondistended with positive bowel sounds no hepatosplenomegaly no guarding or rebound  Neurologically patient awake alert oriented cranial nerves 2-12 appear to be intact    Laboratories reviewed    Assessment plan agree with this admission for 72-year-old female with expanding wound to the back  Patient is status post biopsy in the OR  Will continue antibiotic therapy and monitor for pathology  Patient also with a erythematous rash on the upper arms mostly and shoulders and back  Unclear etiology  Still may need Dermatology input

## 2019-07-18 NOTE — SOCIAL WORK
CM met with pt at bedside and explained role  Pt lives with her  in a 2 story house; stays on 1st floor, [address]  Pt reports she is independnet with ADLs  She uses a cane at times to ambulate  Pt denies the use of any other DME  Pt PCP is Dr Ramsey Oppenheim through [facility]  [place]  [facility] and denies any difficulty obtaining same  Pt denies any history of HHC and STR  She further denied any history of MH and D&A treatment  Pt will require wound care for wound on her back upon discharge  CM dicussed same with pt  She will require daily wound care which [facility] will not provide  Pt aware of same  She reports her  is adamant he will not assist her with wound care on days HHC does not come  Pt herself is adamant she will not go to a SNF for wound care  She will call friends and ask if anyone is willing to help her at home so she can go home with [facility]  A post acute care recommendation was made by your care team for [facility]  Discussed Freedom of Choice with patient  List of agencies given to patient via in person  patient aware the list is custom filtered for them by preference  and that [facility] post acute providers are designated  Pt chose [facility]  Referral made   will transport her home on dsicharge  CM will continue to follow  CM reviewed d/c planning process including the following: identifying help at home, patient preference for d/c planning needs, availability of treatment team to discuss questions or concerns patient and/or family may have regarding understanding medications and recognizing signs and symptoms once discharged  CM also encouraged patient to follow up with all recommended appointments after discharge  Patient advised of importance for patient and family to participate in managing patients medical well being

## 2019-07-19 ENCOUNTER — APPOINTMENT (OUTPATIENT)
Dept: PHYSICAL THERAPY | Facility: CLINIC | Age: 69
End: 2019-07-19
Payer: COMMERCIAL

## 2019-07-19 PROBLEM — K59.00 CONSTIPATION: Status: ACTIVE | Noted: 2019-07-19

## 2019-07-19 PROBLEM — K82.8 ENLARGED GALLBLADDER: Status: ACTIVE | Noted: 2019-07-19

## 2019-07-19 PROBLEM — L57.8 SOLAR DERMATITIS: Status: ACTIVE | Noted: 2019-07-19

## 2019-07-19 NOTE — ASSESSMENT & PLAN NOTE
Patient presented with a chronic full-thickness lesion to the back for the last 4 months  She was seen and examined by surgery who took her to the OR for biopsy  Subsequently patient was seen by Dermatology feels this may represent a basal cell carcinoma  Patient is cleared for discharged home for local wound care and follow up with Plastic surgery

## 2019-07-19 NOTE — DISCHARGE SUMMARY
Discharge- Edith Sherwood 1950, 79 y o  female MRN: [id number]    Unit/Bed#: -01 Encounter: [id number]    Primary Care Provider: Alexx Doctor   Date and time admitted to hospital: 7/16/2019  6:54 PM        Wound, open, back  Assessment & Plan  Patient and spouse will be given wound care instructions per the nursing staff for discharged home this evening  We will arrange for home health to do further teaching at home  Patient is being referred to Plastic surgery  Biopsies are pending  Suspicion is for extensive basal cell carcinoma per Dermatology  Await formal tissue diagnosis  Open wound of right side of back  Assessment & Plan  Patient presented with a chronic full-thickness lesion to the back for the last 4 months  She was seen and examined by surgery who took her to the OR for biopsy  Subsequently patient was seen by Dermatology feels this may represent a basal cell carcinoma  Patient is cleared for discharged home for local wound care and follow up with Plastic surgery  Constipation  Assessment & Plan  Patient noted to have significant constipation are CT scan she was able to of a large bowel movement prior to discharge  Currently is asymptomatic  Gallbladder dilatation  Assessment & Plan  Ct scan suggested enlargement of the gallbladder  Patient is without signs or symptoms of cholecystitis  Patient defers ultrasound  She has been informed of the finding and will report symptoms as they become present  Solar dermatitis  Assessment & Plan  Patient has had a patchy upper arm rash which was felt to be secondary to solar dermatitis  Dermatology has recommended fluocinonide topically with follow-up in the outpatient setting biopsy if required  * Cellulitisresolved as of 7/18/2019  Assessment & Plan  No infective process is suspected here  Antibiotics are discontinued and she will not be traveling to home with any antibiotic therapy              Discharging Physician / Practitioner: Doyle Brody MD  PCP: Jasmyn House  Admission Date:   Admission Orders (From admission, onward)    Ordered        07/16/19 2300  Inpatient Admission  Once             Discharge Date: 7/18/19    Resolved Problems  Date Reviewed: 7/19/2019          Resolved    * (Principal) Cellulitis 7/18/2019     Resolved by  Doyle Brody MD          Consultations During Hospital Stay:  · Surgery  · Dermatology  · Wound care    Procedures Performed:   · CT scan of the chest abdomen pelvis:  Constipation, distention of gallbladder with cholelithiasis  ·     Significant Findings / Test Results:   · As above  · Discharging creatinine 0 71  · Discharging hemoglobin 11 7    Incidental Findings:   · Distention of the gallbladder without signs or symptoms of cholecystitis  Patient defers ultrasound at this time  She has been informed of the finding and will report symptoms as they become present    Test Results Pending at Discharge (will require follow up): · Biopsy with skin     Outpatient Tests Requested:  · None    Complications:  None    Reason for Admission:  Large back wound    Hospital Course:     Jessica Hatchet is a 79 y o  female patient who originally presented to the hospital on 7/16/2019 due to a persistent chronic wound to her right back  Patient reports this area has broken out before and with the placement of Neosporin has healed itself in the past at least 2 times  This time the wound continued to grow in size  She subsequently finally informed her primary care physician of this is center to the hospital for further evaluation  Patient is found to have an extensively large full-thickness wound to the right back  She was admitted to the hospital for further evaluation  Surgery was consulted she was taken to the OR and significant biopsies were obtained  She was started on clindamycin but was not found to have true infection  Dermatology was consulted as    Working diagnosis is possible basal cell carcinoma  No antibiotics are necessary at this time  Patient is cleared for discharged home to follow up as an outpatient with Plastic surgery in the patient was instructed verbally to follow up with Dr Capps Interiano  The patient and her spouse were instructed on wound care dressings and they are open to the intermittent DNA which will be arranged  Patient understands that she needs to follow up for biopsy results  Please see above list of diagnoses and related plan for additional information  Condition at Discharge: fair     Discharge Day Visit / Exam:     Subjective:  Patient feeling okay  No pain  Wants to go home this evening  Reviewed diagnosis from Dermatology  Instructed patient to follow up with her PCP, Plastic surgery and Dermatology  Vitals: Blood Pressure: 145/63 (07/17/19 2336)  Pulse: 73 (07/17/19 2336)  Temperature: 98 °F (36 7 °C) (07/17/19 2336)  Temp Source: Oral (07/17/19 2336)  Respirations: 18 (07/17/19 2336)  Height: 5' 4" (162 6 cm) (07/17/19 1352)  Weight - Scale: 72 7 kg (160 lb 4 4 oz) (07/17/19 1352)  SpO2: 98 % (07/17/19 2336)  Exam:   Physical Exam  Generally well-developed reasonably nourished female no acute distress normocephalic atraumatic pupils equal round and reactive to light extraocular muscles intact mucous membranes are moist neck is supple there is no JVD no lymph nodes no carotid bruits chest is decreased with poor air entry there is no rhonchi rales or wheezes  Examination of the back reveals large full-thickness wound edges are purple old without evidence for infection    Tissue is pink, with no purulent  Cardiovascular regular rate rhythm positive S1 and S2 no S3-S4 murmur or gallop  Abdomen soft nontender nondistended with positive bowel sounds no hepatosplenomegaly no guarding or rebound  Neurologically patient is awake alert oriented cranial nerves 2-12 intact  Discussion with Family:  Updated daughter via phone and spouse in person earlier    Discharge instructions/Information to patient and family:   See after visit summary for information provided to patient and family  Provisions for Follow-Up Care:  See after visit summary for information related to follow-up care and any pertinent home health orders  Disposition:     Home    For Discharges to [facility] SNF:   · Not Applicable to this Patient - Not Applicable to this Patient    Planned Readmission:  None     Discharge Statement:  I spent 35 minutes discharging the patient  This time was spent on the day of discharge  I had direct contact with the patient on the day of discharge  Greater than 50% of the total time was spent examining patient, answering all patient questions, arranging and discussing plan of care with patient as well as directly providing post-discharge instructions  Additional time then spent on discharge activities  Discharge Medications:  See after visit summary for reconciled discharge medications provided to patient and family        ** Please Note: This note has been constructed using a voice recognition system **

## 2019-07-19 NOTE — ASSESSMENT & PLAN NOTE
No infective process is suspected here  Antibiotics are discontinued and she will not be traveling to home with any antibiotic therapy

## 2019-07-19 NOTE — ASSESSMENT & PLAN NOTE
Patient and spouse will be given wound care instructions per the nursing staff for discharged home this evening  We will arrange for home health to do further teaching at home  Patient is being referred to Plastic surgery  Biopsies are pending  Suspicion is for extensive basal cell carcinoma per Dermatology  Await formal tissue diagnosis

## 2019-07-19 NOTE — ASSESSMENT & PLAN NOTE
Patient noted to have significant constipation are CT scan she was able to of a large bowel movement prior to discharge  Currently is asymptomatic

## 2019-07-19 NOTE — ASSESSMENT & PLAN NOTE
Patient has had a patchy upper arm rash which was felt to be secondary to solar dermatitis  Dermatology has recommended fluocinonide topically with follow-up in the outpatient setting biopsy if required

## 2019-07-19 NOTE — ASSESSMENT & PLAN NOTE
Ct scan suggested enlargement of the gallbladder  Patient is without signs or symptoms of cholecystitis  Patient defers ultrasound  She has been informed of the finding and will report symptoms as they become present

## 2019-07-21 LAB
BACTERIA BLD CULT: NORMAL
BACTERIA BLD CULT: NORMAL

## 2019-07-22 ENCOUNTER — APPOINTMENT (OUTPATIENT)
Dept: PHYSICAL THERAPY | Facility: CLINIC | Age: 69
End: 2019-07-22
Payer: COMMERCIAL

## 2019-07-23 ENCOUNTER — APPOINTMENT (OUTPATIENT)
Dept: PHYSICAL THERAPY | Facility: CLINIC | Age: 69
End: 2019-07-23
Payer: COMMERCIAL

## 2019-07-26 ENCOUNTER — APPOINTMENT (OUTPATIENT)
Dept: PHYSICAL THERAPY | Facility: CLINIC | Age: 69
End: 2019-07-26
Payer: COMMERCIAL

## 2019-07-26 ENCOUNTER — CONSULT (OUTPATIENT)
Dept: SURGICAL ONCOLOGY | Facility: CLINIC | Age: 69
End: 2019-07-26
Payer: COMMERCIAL

## 2019-07-26 VITALS
TEMPERATURE: 97.1 F | WEIGHT: 153 LBS | BODY MASS INDEX: 26.12 KG/M2 | RESPIRATION RATE: 16 BRPM | HEIGHT: 64 IN | SYSTOLIC BLOOD PRESSURE: 140 MMHG | HEART RATE: 72 BPM | DIASTOLIC BLOOD PRESSURE: 80 MMHG

## 2019-07-26 DIAGNOSIS — C44.99 BASOSQUAMOUS CARCINOMA OF SKIN: Primary | ICD-10-CM

## 2019-07-26 PROCEDURE — 99204 OFFICE O/P NEW MOD 45 MIN: CPT | Performed by: SURGERY

## 2019-07-26 RX ORDER — TRAMADOL HYDROCHLORIDE 50 MG/1
50 TABLET ORAL EVERY 6 HOURS PRN
Qty: 10 TABLET | Refills: 0 | Status: SHIPPED | OUTPATIENT
Start: 2019-07-26 | End: 2019-09-11 | Stop reason: HOSPADM

## 2019-07-26 NOTE — PATIENT INSTRUCTIONS
Pre-Surgery Instructions:   Medication Instructions    Ascorbic Acid (VITAMIN C) 100 MG tablet Stop taking 1 days prior to surgery    b complex vitamins tablet Stop taking 1 days prior to surgery    cholecalciferol (VITAMIN D3) 400 units tablet Stop taking 1 days prior to surgery    glucosamine-chondroitin 500-400 MG tablet Stop taking 1 week prior to surgery    Multiple Vitamins-Minerals (MULTIVITAMIN WITH MINERALS) tablet Stop taking 1 days prior to surgery    vitamin E 100 UNIT capsule Stop taking 1 week prior to surgery     Excision of Skin Lesion   AMBULATORY CARE:   What you need to know about excision of a skin lesion:  Excision of a skin lesion is surgery to remove a piece of skin tissue  The skin tissue may be malignant (skin cancer) or it may be benign  Benign means the skin tissue does not have cancer cells and cannot spread  How to prepare for excision of a skin lesion:  Your healthcare provider will talk to you about how to prepare for surgery  He may tell you not to eat or drink anything after midnight on the day of your surgery  He will tell you what medicines to take or not take on the day of your surgery  You may be given an antibiotic through your IV to help prevent a bacterial infection  What will happen during excision of a skin lesion:  You will be given local anesthesia to numb the surgery area  With local anesthesia, you may still feel pressure or pushing during surgery, but you should not feel any sharp pain  Your healthcare provider will mirela the area of your skin that will be removed  He will make an incision on the marked area  He will remove the outer layer of your skin  He may also remove deeper layers of tissue underneath your skin  He may use heat to stop any bleeding  He will close the incision with stitches, staples, tissue glue, or medical tape  He may cover your incision with a bandage  Your healthcare provider may send samples of your tissue to the lab for tests     What will happen after excision of a skin lesion:  Your stitches will need to be removed after a period of time  The amount of time depends on the part of the body where the surgery was done  Stitches on the face will be removed within 5 to 7 days  Stitches on the trunk of your body will be removed within 7 to 10 days  Stitches on your arms or legs will be removed within 10 to 14 days  Medical tape usually falls off on its own in about 7 to 10 days  Risks of excision of a skin lesion:  You may bleed more than expected or get an infection  You may lose feeling, or you may feel tingling or prickling in the surgery area  Your scar may not look the way you expected  It may also limit your movement or affect your expressions if you had surgery on your face  Seek care immediately if:   · Your stitches come apart and the wound opens  Contact your healthcare provider if:   · You have pain in your incision that does not get better with medicine  · You have a fever or chills  · Your wound is red, swollen, bleeding, or draining pus  · You have questions or concerns about your condition or care  Care for your wound as directed:  Carefully wash the wound with soap and water  Dry the area and put on new, clean bandages as directed  Change your bandages when they get wet or dirty  You may take a shower 24 hours after  your surgery  Do not  soak in water (bathtub, hot tub, swimming pool) until after your stitches have been removed  Check your wound for signs of infection such as redness, swelling, or pus drainage  Follow up with your healthcare provider as directed: You will need to return to have your stitches removed  Write down your questions so you remember to ask them during your visits  © 2017 [employer] Information is for End User's use only and may not be sold, redistributed or otherwise used for commercial purposes   All illustrations and images included in CareNotes® are the copyrighted property of [employer]  or [employer]  The above information is an  only  It is not intended as medical advice for individual conditions or treatments  Talk to your doctor, nurse or pharmacist before following any medical regimen to see if it is safe and effective for you

## 2019-07-26 NOTE — H&P (VIEW-ONLY)
Surgical Oncology Follow Up       [facility] SURGICAL ONCOLOGY [address]  1950  [id number]  [facility] SURGICAL ONCOLOGY [facility]    Chief Complaint   Patient presents with    Consult     Ulcerated basosquamous carcinoma  Assessment/Plan:    No problem-specific Assessment & Plan notes found for this encounter  Diagnoses and all orders for this visit:    Basosquamous carcinoma of skin  -     Case request operating room: EXCISION WIDE LESION BACK; Standing  -     Comprehensive metabolic panel; Future  -     CBC and Platelet; Future  -     EKG 12 lead; Future  -     Case request operating room: [facility] [place]  -     Ambulatory referral to Plastic Surgery; Future  -     US groin/inguinal area; Future  -     US extremity soft tissue; Future  -     traMADol (ULTRAM) 50 mg tablet; Take 1 tablet (50 mg total) by mouth every 6 (six) hours as needed for moderate pain    Other orders  -     b complex vitamins tablet; Take 1 tablet by mouth daily  -     Incentive spirometry; Standing  -     Insert and maintain IV line; Standing  -     Void On-Call to O R ; Standing  -     Place sequential compression device; Standing        Advance Care Planning/Advance Directives:  Discussed disease status, cancer treatment plans and/or cancer treatment goals with the patient  Basosquamous carcinoma of skin    7/17/2019 Biopsy     Skin Back, excisional biopsy of back mass:  - Ulcerated basosquamous carcinoma, extending into deep reticular dermis and superficial     subcutis (6 4 mm deep, Donell's level IV-V)  - No perineural or lymphovascular invasion identified   - Peripheral and deep peripheral resection margins involved by invasive carcinoma  History of Present Illness:   The patient is a 53-year-old woman who years ago noticed a bug bite on her right back  She treated this with creams and topical ointments  The lesion never really went away and progressed over the last several years  This came to the attention of her family approximately 3 months ago  A large ulcerated lesion was noted on her back  This culminated in a biopsy confirming basal squamous cancer  She has been referred for evaluation and treatment  Review of Systems   Constitutional: Negative  HENT: Negative  Eyes: Negative  Respiratory: Negative  Cardiovascular: Negative  Gastrointestinal: Negative  Endocrine: Negative  Genitourinary: Negative  Musculoskeletal: Negative  Skin: Positive for wound  Allergic/Immunologic: Negative  Neurological: Negative  Hematological: Negative  Psychiatric/Behavioral: Negative            Patient Active Problem List   Diagnosis    Wound, open, back    Open wound of right side of back    Solar dermatitis    Gallbladder dilatation    Constipation    Basosquamous carcinoma of skin     Past Medical History:   Diagnosis Date    Seasonal allergies      Past Surgical History:   Procedure Laterality Date     SECTION      MASS EXCISION N/A 2019    Procedure: EXCISION  BIOPSY LESION/MASS BACK;  Surgeon: Bishop Lowell MD;  Location: [facility] OR;  Service: General     Family History   Problem Relation Age of Onset    Heart disease Mother     Bone cancer Father     Cancer Brother         neuroblastoma     Social History     Socioeconomic History    Marital status: /Civil Union     Spouse name: Not on file    Number of children: Not on file    Years of education: Not on file    Highest education level: Not on file   Occupational History    Not on file   Social Needs    Financial resource strain: Not on file    Food insecurity:     Worry: Not on file     Inability: Not on file    Transportation needs:     Medical: Not on file     Non-medical: Not on file   Tobacco Use    Smoking status: Current Every Day Smoker     Packs/day: 0 50     Types: Cigarettes    Smokeless tobacco: Never Used   Substance and Sexual Activity    Alcohol use: Not Currently    Drug use: Not Currently    Sexual activity: Not on file   Lifestyle    Physical activity:     Days per week: Not on file     Minutes per session: Not on file    Stress: Not on file   Relationships    Social connections:     Talks on phone: Not on file     Gets together: Not on file     Attends Yazidi service: Not on file     Active member of club or organization: Not on file     Attends meetings of clubs or organizations: Not on file     Relationship status: Not on file    Intimate partner violence:     Fear of current or ex partner: Not on file     Emotionally abused: Not on file     Physically abused: Not on file     Forced sexual activity: Not on file   Other Topics Concern    Not on file   Social History Narrative    Not on file       Current Outpatient Medications:     Ascorbic Acid (VITAMIN C) 100 MG tablet, Take 100 mg by mouth daily, Disp: , Rfl:     b complex vitamins tablet, Take 1 tablet by mouth daily, Disp: , Rfl:     cholecalciferol (VITAMIN D3) 400 units tablet, Take 400 Units by mouth daily, Disp: , Rfl:     glucosamine-chondroitin 500-400 MG tablet, Take 1 tablet by mouth 2 (two) times a day , Disp: , Rfl:     Multiple Vitamins-Minerals (MULTIVITAMIN WITH MINERALS) tablet, Take 1 tablet by mouth daily, Disp: , Rfl:     vitamin E 100 UNIT capsule, Take 100 Units by mouth daily, Disp: , Rfl:     fluocinonide (LIDEX) 0 05 % cream, Apply topically 2 (two) times a day To the small rash on your arms and back   DO NOT apply to the large wound on your back , Disp: 30 g, Rfl: 0    traMADol (ULTRAM) 50 mg tablet, Take 1 tablet (50 mg total) by mouth every 6 (six) hours as needed for moderate pain, Disp: 10 tablet, Rfl: 0    vitamin B-12 (CYANOCOBALAMIN) 100 MCG tablet, Take 100 mcg by mouth daily, Disp: , Rfl:   No Known Allergies  Vitals:    07/26/19 1318   BP: 140/80   Pulse: 72   Resp: 16   Temp: (!) 97 1 °F (36 2 °C)       Physical Exam   Constitutional: She is oriented to person, place, and time  She appears well-developed and well-nourished  HENT:   Head: Normocephalic and atraumatic  Right Ear: External ear normal    Left Ear: External ear normal    Eyes: Pupils are equal, round, and reactive to light  Neck: Normal range of motion  Neck supple  Cardiovascular: Normal rate, regular rhythm and normal heart sounds  Pulmonary/Chest: Breath sounds normal    Abdominal: Soft  Bowel sounds are normal    Musculoskeletal: Normal range of motion  Neurological: She is alert and oriented to person, place, and time  Skin: Skin is warm and dry  Negative for cervical, axillary, and inguinal adenopathy  Large 35 cm x 10 cm irregular pain granulating ulcerating tumor, involving right to mid back  Psychiatric: She has a normal mood and affect  Her behavior is normal  Thought content normal        Pathology:  Case Report   Surgical Pathology Report                         Case: [id number]                                    Authorizing Provider: Gonzalez Isbell MD         Collected:           07/17/2019 1419               Ordering Location:     [facility] Received:            07/17/2019 1435                                      Operating Room                                                                Pathologist:           José Miguel Eduardo MD                                                              Specimen:    Back, Biopsy of back mass                                                                  Final Diagnosis   A  Skin Back, excisional biopsy of back mass:  - Ulcerated basosquamous carcinoma, extending into deep reticular dermis and superficial     subcutis (6 4 mm deep, Donell's level IV-V)    - No perineural or lymphovascular invasion identified   - Peripheral and deep peripheral resection margins involved by invasive carcinoma         Interpretation performed at [facility], [address] [place] [address]       Electronically signed by Nadir Barker MD on 7/24/2019 at 11:18 AM         Labs:  none    Imaging  Ct Chest Abdomen Pelvis W Contrast    Result Date: 7/16/2019  Narrative: CT CHEST, ABDOMEN AND PELVIS WITH IV CONTRAST INDICATION:   Bacteremia  COMPARISON:  None  TECHNIQUE: CT examination of the chest, abdomen and pelvis was performed  Axial, sagittal, and coronal 2D reformatted images were created from the source data and submitted for interpretation  Radiation dose length product (DLP) for this visit:  613 mGy-cm   This examination, like all CT scans performed in the [facility], was performed utilizing techniques to minimize radiation dose exposure, including the use of iterative reconstruction and automated exposure control  IV Contrast:  100 mL of iohexol (OMNIPAQUE) Enteric Contrast: Enteric contrast was administered  FINDINGS: CHEST LUNGS:  Lungs are clear  There is no tracheal or endobronchial lesion  PLEURA:  No effusion  HEART/GREAT VESSELS:  Normal heart size  No thoracic aortic aneurysm or dissection  MEDIASTINUM AND DELANEY:  No lymphadenopathy   CHEST WALL AND LOWER NECK:   Unremarkable  ABDOMEN LIVER/BILIARY TREE:  No biliary obstruction  GALLBLADDER:  Markedly distended gallbladder  Several gallstones in the fundus and neck  No gallbladder wall thickening or pericholecystic fluid  SPLEEN:  Unremarkable  PANCREAS:  Unremarkable  ADRENAL GLANDS:  Unremarkable  KIDNEYS/URETERS:  No pyelonephritis or obstructive uropathy  STOMACH AND BOWEL:  Diverticulosis throughout the colon without evidence of diverticulitis or colitis  Significant amount stool throughout the colon  No bowel obstruction  APPENDIX:  A normal appendix was visualized  ABDOMINOPELVIC CAVITY:  No ascites or free intraperitoneal air  No lymphadenopathy   VESSELS:  Mild calcified plaque in the abdominal aorta  PELVIS REPRODUCTIVE ORGANS:  No pelvic mass or fluid  URINARY BLADDER:  Unremarkable  ABDOMINAL WALL/INGUINAL REGIONS:  Unremarkable  OSSEOUS STRUCTURES:  No acute fracture or destructive osseous lesion  Impression: 1  No acute cardiopulmonary process  2   Cholelithiasis  Significant gallbladder distention without evidence of acute cholecystitis  Right upper quadrant ultrasound may be helpful if there is persistent concern for acute cholecystitis  3   Significant amount stool throughout the colon may indicate constipation  No evidence of colitis, diverticulitis or bowel obstruction  Workstation performed: [id number]     I reviewed the above laboratory and imaging data  Discussion/Summary:  73-year-old woman with joint basosquamous cell cancer  This is amenable to wide excision, with plans for reconstruction by Plastic surgery team   Rationale for this along with risks and benefits of surgery including infection, bleeding, and need for possible distal surgery, were discussed with patient and family  They understand the plan and wish to proceed as outlined  All questions answered and consents were signed at this visit  As part of metastatic workup, will obtain ultrasound of groins and axillae  CT scan was thus far negative

## 2019-07-26 NOTE — LETTER
July 26, 2019     Dorina Woody MD  [address] [facility] [address]    Patient: Carlos Stone   YOB: 1950   Date of Visit: 7/26/2019       Dear Dr Nell Sanches: Thank you for referring Carlos Stone to me for evaluation  Below are my notes for this consultation  If you have questions, please do not hesitate to call me  I look forward to following your patient along with you  Sincerely,        Delmy Boston MD        CC: No Recipients  Delmy Boston MD  7/26/2019  2:06 PM  Sign at close encounter               Surgical Oncology Follow Up       [facility]  [address] [facility]  1950  [id number]  [place] [address]    Chief Complaint   Patient presents with    Consult     Ulcerated basosquamous carcinoma  Assessment/Plan:    No problem-specific Assessment & Plan notes found for this encounter  Diagnoses and all orders for this visit:    Basosquamous carcinoma of skin  -     Case request operating room: EXCISION WIDE LESION BACK; Standing  -     Comprehensive metabolic panel; Future  -     CBC and Platelet; Future  -     EKG 12 lead; Future  -     Case request operating room: [facility] [place] [facility] [place]  -     Ambulatory referral to Plastic Surgery; Future  -     US groin/inguinal area; Future  -     US extremity soft tissue; Future  -     traMADol (ULTRAM) 50 mg tablet; Take 1 tablet (50 mg total) by mouth every 6 (six) hours as needed for moderate pain    Other orders  -     b complex vitamins tablet;  Take 1 tablet by mouth daily  -     Incentive spirometry; Standing  -     Insert and maintain IV line; Standing  -     Void On-Call to O R ; Standing  -     Place sequential compression device; Standing        Advance Care Planning/Advance Directives:  Discussed disease status, cancer treatment plans and/or cancer treatment goals with the patient  Basosquamous carcinoma of skin    2019 Biopsy     Skin Back, excisional biopsy of back mass:  - Ulcerated basosquamous carcinoma, extending into deep reticular dermis and superficial     subcutis (6 4 mm deep, Donell's level IV-V)  - No perineural or lymphovascular invasion identified   - Peripheral and deep peripheral resection margins involved by invasive carcinoma  History of Present Illness: The patient is a 61-year-old woman who years ago noticed a bug bite on her right back  She treated this with creams and topical ointments  The lesion never really went away and progressed over the last several years  This came to the attention of her family approximately 3 months ago  A large ulcerated lesion was noted on her back  This culminated in a biopsy confirming basal squamous cancer  She has been referred for evaluation and treatment  Review of Systems   Constitutional: Negative  HENT: Negative  Eyes: Negative  Respiratory: Negative  Cardiovascular: Negative  Gastrointestinal: Negative  Endocrine: Negative  Genitourinary: Negative  Musculoskeletal: Negative  Skin: Positive for wound  Allergic/Immunologic: Negative  Neurological: Negative  Hematological: Negative  Psychiatric/Behavioral: Negative            Patient Active Problem List   Diagnosis    Wound, open, back    Open wound of right side of back    Solar dermatitis    Gallbladder dilatation    Constipation    Basosquamous carcinoma of skin     Past Medical History:   Diagnosis Date    Seasonal allergies      Past Surgical History:   Procedure Laterality Date     SECTION      MASS EXCISION N/A 2019    Procedure: EXCISION  BIOPSY LESION/MASS BACK;  Surgeon: Allen Santana MD;  Location: [facility] OR;  Service: General     Family History   Problem Relation Age of Onset    Heart disease Mother     Bone cancer Father     Cancer Brother         neuroblastoma     Social History     Socioeconomic History    Marital status: /Civil Union     Spouse name: Not on file    Number of children: Not on file    Years of education: Not on file    Highest education level: Not on file   Occupational History    Not on file   Social Needs    Financial resource strain: Not on file    Food insecurity:     Worry: Not on file     Inability: Not on file    Transportation needs:     Medical: Not on file     Non-medical: Not on file   Tobacco Use    Smoking status: Current Every Day Smoker     Packs/day: 0 50     Types: Cigarettes    Smokeless tobacco: Never Used   Substance and Sexual Activity    Alcohol use: Not Currently    Drug use: Not Currently    Sexual activity: Not on file   Lifestyle    Physical activity:     Days per week: Not on file     Minutes per session: Not on file    Stress: Not on file   Relationships    Social connections:     Talks on phone: Not on file     Gets together: Not on file     Attends Gnosticism service: Not on file     Active member of club or organization: Not on file     Attends meetings of clubs or organizations: Not on file     Relationship status: Not on file    Intimate partner violence:     Fear of current or ex partner: Not on file     Emotionally abused: Not on file     Physically abused: Not on file     Forced sexual activity: Not on file   Other Topics Concern    Not on file   Social History Narrative    Not on file       Current Outpatient Medications:     Ascorbic Acid (VITAMIN C) 100 MG tablet, Take 100 mg by mouth daily, Disp: , Rfl:     b complex vitamins tablet, Take 1 tablet by mouth daily, Disp: , Rfl:     cholecalciferol (VITAMIN D3) 400 units tablet, Take 400 Units by mouth daily, Disp: , Rfl:     glucosamine-chondroitin 500-400 MG tablet, Take 1 tablet by mouth 2 (two) times a day , Disp: , Rfl:     Multiple Vitamins-Minerals (MULTIVITAMIN WITH MINERALS) tablet, Take 1 tablet by mouth daily, Disp: , Rfl:     vitamin E 100 UNIT capsule, Take 100 Units by mouth daily, Disp: , Rfl:     fluocinonide (LIDEX) 0 05 % cream, Apply topically 2 (two) times a day To the small rash on your arms and back  DO NOT apply to the large wound on your back , Disp: 30 g, Rfl: 0    traMADol (ULTRAM) 50 mg tablet, Take 1 tablet (50 mg total) by mouth every 6 (six) hours as needed for moderate pain, Disp: 10 tablet, Rfl: 0    vitamin B-12 (CYANOCOBALAMIN) 100 MCG tablet, Take 100 mcg by mouth daily, Disp: , Rfl:   No Known Allergies  Vitals:    07/26/19 1318   BP: 140/80   Pulse: 72   Resp: 16   Temp: (!) 97 1 °F (36 2 °C)       Physical Exam   Constitutional: She is oriented to person, place, and time  She appears well-developed and well-nourished  HENT:   Head: Normocephalic and atraumatic  Right Ear: External ear normal    Left Ear: External ear normal    Eyes: Pupils are equal, round, and reactive to light  Neck: Normal range of motion  Neck supple  Cardiovascular: Normal rate, regular rhythm and normal heart sounds  Pulmonary/Chest: Breath sounds normal    Abdominal: Soft  Bowel sounds are normal    Musculoskeletal: Normal range of motion  Neurological: She is alert and oriented to person, place, and time  Skin: Skin is warm and dry  Negative for cervical, axillary, and inguinal adenopathy  Large 35 cm x 10 cm irregular pain granulating ulcerating tumor, involving right to mid back  Psychiatric: She has a normal mood and affect   Her behavior is normal  Thought content normal        Pathology:  Case Report   Surgical Pathology Report                         Case: [id number]                                    Authorizing Provider: José Miguel Jones MD         Collected:           07/17/2019 1419               Ordering Location:     [facility] Received:            07/17/2019 1431                                      Operating Room                                                                Pathologist:           Nadir Barker MD                                                              Specimen:    Back, Biopsy of back mass                                                                  Final Diagnosis   A  Skin Back, excisional biopsy of back mass:  - Ulcerated basosquamous carcinoma, extending into deep reticular dermis and superficial     subcutis (6 4 mm deep, Donell's level IV-V)  - No perineural or lymphovascular invasion identified   - Peripheral and deep peripheral resection margins involved by invasive carcinoma         Interpretation performed at [facility], [address] [place] [address]       Electronically signed by Nadir Barker MD on 7/24/2019 at 11:18 AM         Labs:  none    Imaging  Ct Chest Abdomen Pelvis W Contrast    Result Date: 7/16/2019  Narrative: CT CHEST, ABDOMEN AND PELVIS WITH IV CONTRAST INDICATION:   Bacteremia  COMPARISON:  None  TECHNIQUE: CT examination of the chest, abdomen and pelvis was performed  Axial, sagittal, and coronal 2D reformatted images were created from the source data and submitted for interpretation  Radiation dose length product (DLP) for this visit:  613 mGy-cm   This examination, like all CT scans performed in the [facility], was performed utilizing techniques to minimize radiation dose exposure, including the use of iterative reconstruction and automated exposure control  IV Contrast:  100 mL of iohexol (OMNIPAQUE) Enteric Contrast: Enteric contrast was administered  FINDINGS: CHEST LUNGS:  Lungs are clear  There is no tracheal or endobronchial lesion  PLEURA:  No effusion  HEART/GREAT VESSELS:  Normal heart size  No thoracic aortic aneurysm or dissection  MEDIASTINUM AND DELANEY:  No lymphadenopathy   CHEST WALL AND LOWER NECK:   Unremarkable  ABDOMEN LIVER/BILIARY TREE:  No biliary obstruction  GALLBLADDER:  Markedly distended gallbladder    Several gallstones in the fundus and neck   No gallbladder wall thickening or pericholecystic fluid  SPLEEN:  Unremarkable  PANCREAS:  Unremarkable  ADRENAL GLANDS:  Unremarkable  KIDNEYS/URETERS:  No pyelonephritis or obstructive uropathy  STOMACH AND BOWEL:  Diverticulosis throughout the colon without evidence of diverticulitis or colitis  Significant amount stool throughout the colon  No bowel obstruction  APPENDIX:  A normal appendix was visualized  ABDOMINOPELVIC CAVITY:  No ascites or free intraperitoneal air  No lymphadenopathy  VESSELS:  Mild calcified plaque in the abdominal aorta  PELVIS REPRODUCTIVE ORGANS:  No pelvic mass or fluid  URINARY BLADDER:  Unremarkable  ABDOMINAL WALL/INGUINAL REGIONS:  Unremarkable  OSSEOUS STRUCTURES:  No acute fracture or destructive osseous lesion  Impression: 1  No acute cardiopulmonary process  2   Cholelithiasis  Significant gallbladder distention without evidence of acute cholecystitis  Right upper quadrant ultrasound may be helpful if there is persistent concern for acute cholecystitis  3   Significant amount stool throughout the colon may indicate constipation  No evidence of colitis, diverticulitis or bowel obstruction  Workstation performed: [id number]     I reviewed the above laboratory and imaging data  Discussion/Summary:  60-year-old woman with joint basosquamous cell cancer  This is amenable to wide excision, with plans for reconstruction by Plastic surgery team   Rationale for this along with risks and benefits of surgery including infection, bleeding, and need for possible distal surgery, were discussed with patient and family  They understand the plan and wish to proceed as outlined  All questions answered and consents were signed at this visit  As part of metastatic workup, will obtain ultrasound of groins and axillae  CT scan was thus far negative

## 2019-07-26 NOTE — PROGRESS NOTES
Surgical Oncology Follow Up       [facility] SURGICAL ONCOLOGY [address]  1950  [id number]  [facility] SURGICAL ONCOLOGY [facility]    Chief Complaint   Patient presents with    Consult     Ulcerated basosquamous carcinoma  Assessment/Plan:    No problem-specific Assessment & Plan notes found for this encounter  Diagnoses and all orders for this visit:    Basosquamous carcinoma of skin  -     Case request operating room: EXCISION WIDE LESION BACK; Standing  -     Comprehensive metabolic panel; Future  -     CBC and Platelet; Future  -     EKG 12 lead; Future  -     Case request operating room: [facility] [place]  -     Ambulatory referral to Plastic Surgery; Future  -     US groin/inguinal area; Future  -     US extremity soft tissue; Future  -     traMADol (ULTRAM) 50 mg tablet; Take 1 tablet (50 mg total) by mouth every 6 (six) hours as needed for moderate pain    Other orders  -     b complex vitamins tablet; Take 1 tablet by mouth daily  -     Incentive spirometry; Standing  -     Insert and maintain IV line; Standing  -     Void On-Call to O R ; Standing  -     Place sequential compression device; Standing        Advance Care Planning/Advance Directives:  Discussed disease status, cancer treatment plans and/or cancer treatment goals with the patient  Basosquamous carcinoma of skin    7/17/2019 Biopsy     Skin Back, excisional biopsy of back mass:  - Ulcerated basosquamous carcinoma, extending into deep reticular dermis and superficial     subcutis (6 4 mm deep, Donell's level IV-V)  - No perineural or lymphovascular invasion identified   - Peripheral and deep peripheral resection margins involved by invasive carcinoma  History of Present Illness:   The patient is a 61-year-old woman who years ago noticed a bug bite on her right back  She treated this with creams and topical ointments  The lesion never really went away and progressed over the last several years  This came to the attention of her family approximately 3 months ago  A large ulcerated lesion was noted on her back  This culminated in a biopsy confirming basal squamous cancer  She has been referred for evaluation and treatment  Review of Systems   Constitutional: Negative  HENT: Negative  Eyes: Negative  Respiratory: Negative  Cardiovascular: Negative  Gastrointestinal: Negative  Endocrine: Negative  Genitourinary: Negative  Musculoskeletal: Negative  Skin: Positive for wound  Allergic/Immunologic: Negative  Neurological: Negative  Hematological: Negative  Psychiatric/Behavioral: Negative            Patient Active Problem List   Diagnosis    Wound, open, back    Open wound of right side of back    Solar dermatitis    Gallbladder dilatation    Constipation    Basosquamous carcinoma of skin     Past Medical History:   Diagnosis Date    Seasonal allergies      Past Surgical History:   Procedure Laterality Date     SECTION      MASS EXCISION N/A 2019    Procedure: EXCISION  BIOPSY LESION/MASS BACK;  Surgeon: Eyad Cheema MD;  Location: [facility];  Service: General     Family History   Problem Relation Age of Onset    Heart disease Mother     Bone cancer Father     Cancer Brother         neuroblastoma     Social History     Socioeconomic History    Marital status: /Civil Union     Spouse name: Not on file    Number of children: Not on file    Years of education: Not on file    Highest education level: Not on file   Occupational History    Not on file   Social Needs    Financial resource strain: Not on file    Food insecurity:     Worry: Not on file     Inability: Not on file    Transportation needs:     Medical: Not on file     Non-medical: Not on file   Tobacco Use    Smoking status: Current Every Day Smoker     Packs/day: 0 50     Types: Cigarettes    Smokeless tobacco: Never Used   Substance and Sexual Activity    Alcohol use: Not Currently    Drug use: Not Currently    Sexual activity: Not on file   Lifestyle    Physical activity:     Days per week: Not on file     Minutes per session: Not on file    Stress: Not on file   Relationships    Social connections:     Talks on phone: Not on file     Gets together: Not on file     Attends Quaker service: Not on file     Active member of club or organization: Not on file     Attends meetings of clubs or organizations: Not on file     Relationship status: Not on file    Intimate partner violence:     Fear of current or ex partner: Not on file     Emotionally abused: Not on file     Physically abused: Not on file     Forced sexual activity: Not on file   Other Topics Concern    Not on file   Social History Narrative    Not on file       Current Outpatient Medications:     Ascorbic Acid (VITAMIN C) 100 MG tablet, Take 100 mg by mouth daily, Disp: , Rfl:     b complex vitamins tablet, Take 1 tablet by mouth daily, Disp: , Rfl:     cholecalciferol (VITAMIN D3) 400 units tablet, Take 400 Units by mouth daily, Disp: , Rfl:     glucosamine-chondroitin 500-400 MG tablet, Take 1 tablet by mouth 2 (two) times a day , Disp: , Rfl:     Multiple Vitamins-Minerals (MULTIVITAMIN WITH MINERALS) tablet, Take 1 tablet by mouth daily, Disp: , Rfl:     vitamin E 100 UNIT capsule, Take 100 Units by mouth daily, Disp: , Rfl:     fluocinonide (LIDEX) 0 05 % cream, Apply topically 2 (two) times a day To the small rash on your arms and back   DO NOT apply to the large wound on your back , Disp: 30 g, Rfl: 0    traMADol (ULTRAM) 50 mg tablet, Take 1 tablet (50 mg total) by mouth every 6 (six) hours as needed for moderate pain, Disp: 10 tablet, Rfl: 0    vitamin B-12 (CYANOCOBALAMIN) 100 MCG tablet, Take 100 mcg by mouth daily, Disp: , Rfl:   No Known Allergies  Vitals:    07/26/19 1318   BP: 140/80   Pulse: 72   Resp: 16   Temp: (!) 97 1 °F (36 2 °C)       Physical Exam   Constitutional: She is oriented to person, place, and time  She appears well-developed and well-nourished  HENT:   Head: Normocephalic and atraumatic  Right Ear: External ear normal    Left Ear: External ear normal    Eyes: Pupils are equal, round, and reactive to light  Neck: Normal range of motion  Neck supple  Cardiovascular: Normal rate, regular rhythm and normal heart sounds  Pulmonary/Chest: Breath sounds normal    Abdominal: Soft  Bowel sounds are normal    Musculoskeletal: Normal range of motion  Neurological: She is alert and oriented to person, place, and time  Skin: Skin is warm and dry  Negative for cervical, axillary, and inguinal adenopathy  Large 35 cm x 10 cm irregular pain granulating ulcerating tumor, involving right to mid back  Psychiatric: She has a normal mood and affect  Her behavior is normal  Thought content normal        Pathology:  Case Report   Surgical Pathology Report                         Case: [id number]                                    Authorizing Provider: Zach Rodriguez MD         Collected:           07/17/2019 1419               Ordering Location:     [facility] Received:            07/17/2019 1435                                      Operating Room                                                                Pathologist:           Vamsi Castro MD                                                              Specimen:    Back, Biopsy of back mass                                                                  Final Diagnosis   A  Skin Back, excisional biopsy of back mass:  - Ulcerated basosquamous carcinoma, extending into deep reticular dermis and superficial     subcutis (6 4 mm deep, Donell's level IV-V)    - No perineural or lymphovascular invasion identified   - Peripheral and deep peripheral resection margins involved by invasive carcinoma         Interpretation performed at [facility], [address] [place] [address]       Electronically signed by Asim Eagle MD on 7/24/2019 at 11:18 AM         Labs:  none    Imaging  Ct Chest Abdomen Pelvis W Contrast    Result Date: 7/16/2019  Narrative: CT CHEST, ABDOMEN AND PELVIS WITH IV CONTRAST INDICATION:   Bacteremia  COMPARISON:  None  TECHNIQUE: CT examination of the chest, abdomen and pelvis was performed  Axial, sagittal, and coronal 2D reformatted images were created from the source data and submitted for interpretation  Radiation dose length product (DLP) for this visit:  613 mGy-cm   This examination, like all CT scans performed in the [facility], was performed utilizing techniques to minimize radiation dose exposure, including the use of iterative reconstruction and automated exposure control  IV Contrast:  100 mL of iohexol (OMNIPAQUE) Enteric Contrast: Enteric contrast was administered  FINDINGS: CHEST LUNGS:  Lungs are clear  There is no tracheal or endobronchial lesion  PLEURA:  No effusion  HEART/GREAT VESSELS:  Normal heart size  No thoracic aortic aneurysm or dissection  MEDIASTINUM AND DELANEY:  No lymphadenopathy   CHEST WALL AND LOWER NECK:   Unremarkable  ABDOMEN LIVER/BILIARY TREE:  No biliary obstruction  GALLBLADDER:  Markedly distended gallbladder  Several gallstones in the fundus and neck  No gallbladder wall thickening or pericholecystic fluid  SPLEEN:  Unremarkable  PANCREAS:  Unremarkable  ADRENAL GLANDS:  Unremarkable  KIDNEYS/URETERS:  No pyelonephritis or obstructive uropathy  STOMACH AND BOWEL:  Diverticulosis throughout the colon without evidence of diverticulitis or colitis  Significant amount stool throughout the colon  No bowel obstruction  APPENDIX:  A normal appendix was visualized  ABDOMINOPELVIC CAVITY:  No ascites or free intraperitoneal air  No lymphadenopathy   VESSELS:  Mild calcified plaque in the abdominal aorta  PELVIS REPRODUCTIVE ORGANS:  No pelvic mass or fluid  URINARY BLADDER:  Unremarkable  ABDOMINAL WALL/INGUINAL REGIONS:  Unremarkable  OSSEOUS STRUCTURES:  No acute fracture or destructive osseous lesion  Impression: 1  No acute cardiopulmonary process  2   Cholelithiasis  Significant gallbladder distention without evidence of acute cholecystitis  Right upper quadrant ultrasound may be helpful if there is persistent concern for acute cholecystitis  3   Significant amount stool throughout the colon may indicate constipation  No evidence of colitis, diverticulitis or bowel obstruction  Workstation performed: [id number]     I reviewed the above laboratory and imaging data  Discussion/Summary:  24-year-old woman with joint basosquamous cell cancer  This is amenable to wide excision, with plans for reconstruction by Plastic surgery team   Rationale for this along with risks and benefits of surgery including infection, bleeding, and need for possible distal surgery, were discussed with patient and family  They understand the plan and wish to proceed as outlined  All questions answered and consents were signed at this visit  As part of metastatic workup, will obtain ultrasound of groins and axillae  CT scan was thus far negative

## 2019-07-26 NOTE — LETTER
July 26, 2019     No Recipients    Patient: Smita Alvarez   YOB: 1950   Date of Visit: 7/26/2019       Dear Dr Will Canada Recipients: Thank you for referring Smita Alvarez to me for evaluation  Below are my notes for this consultation  If you have questions, please do not hesitate to call me  I look forward to following your patient along with you  Sincerely,        Marge Batista MD        CC: No Recipients  Marge Batista MD  7/26/2019  2:06 PM  Sign at close encounter               Surgical Oncology Follow Up       [address]  1950  [id number]  [place] [address]    Chief Complaint   Patient presents with    Consult     Ulcerated basosquamous carcinoma  Assessment/Plan:    No problem-specific Assessment & Plan notes found for this encounter  Diagnoses and all orders for this visit:    Basosquamous carcinoma of skin  -     Case request operating room: EXCISION WIDE LESION BACK; Standing  -     Comprehensive metabolic panel; Future  -     CBC and Platelet; Future  -     EKG 12 lead; Future  -     Case request operating room: [facility] [place]  -     Ambulatory referral to Plastic Surgery; Future  -     US groin/inguinal area; Future  -     US extremity soft tissue; Future  -     traMADol (ULTRAM) 50 mg tablet; Take 1 tablet (50 mg total) by mouth every 6 (six) hours as needed for moderate pain    Other orders  -     b complex vitamins tablet;  Take 1 tablet by mouth daily  -     Incentive spirometry; Standing  -     Insert and maintain IV line; Standing  -     Void On-Call to O R ; Standing  -     Place sequential compression device; Standing        Advance Care Planning/Advance Directives:  Discussed disease status, cancer treatment plans and/or cancer treatment goals with the patient  Basosquamous carcinoma of skin    2019 Biopsy     Skin Back, excisional biopsy of back mass:  - Ulcerated basosquamous carcinoma, extending into deep reticular dermis and superficial     subcutis (6 4 mm deep, Donell's level IV-V)  - No perineural or lymphovascular invasion identified   - Peripheral and deep peripheral resection margins involved by invasive carcinoma  History of Present Illness: The patient is a 51-year-old woman who years ago noticed a bug bite on her right back  She treated this with creams and topical ointments  The lesion never really went away and progressed over the last several years  This came to the attention of her family approximately 3 months ago  A large ulcerated lesion was noted on her back  This culminated in a biopsy confirming basal squamous cancer  She has been referred for evaluation and treatment  Review of Systems   Constitutional: Negative  HENT: Negative  Eyes: Negative  Respiratory: Negative  Cardiovascular: Negative  Gastrointestinal: Negative  Endocrine: Negative  Genitourinary: Negative  Musculoskeletal: Negative  Skin: Positive for wound  Allergic/Immunologic: Negative  Neurological: Negative  Hematological: Negative  Psychiatric/Behavioral: Negative            Patient Active Problem List   Diagnosis    Wound, open, back    Open wound of right side of back    Solar dermatitis    Gallbladder dilatation    Constipation    Basosquamous carcinoma of skin     Past Medical History:   Diagnosis Date    Seasonal allergies      Past Surgical History:   Procedure Laterality Date     SECTION      MASS EXCISION N/A 2019    Procedure: EXCISION  BIOPSY LESION/MASS BACK;  Surgeon: Zach Rodriguez MD;  Location: [facility];  Service: General     Family History   Problem Relation Age of Onset    Heart disease Mother     Bone cancer Father     Cancer Brother neuroblastoma     Social History     Socioeconomic History    Marital status: /Civil Union     Spouse name: Not on file    Number of children: Not on file    Years of education: Not on file    Highest education level: Not on file   Occupational History    Not on file   Social Needs    Financial resource strain: Not on file    Food insecurity:     Worry: Not on file     Inability: Not on file    Transportation needs:     Medical: Not on file     Non-medical: Not on file   Tobacco Use    Smoking status: Current Every Day Smoker     Packs/day: 0 50     Types: Cigarettes    Smokeless tobacco: Never Used   Substance and Sexual Activity    Alcohol use: Not Currently    Drug use: Not Currently    Sexual activity: Not on file   Lifestyle    Physical activity:     Days per week: Not on file     Minutes per session: Not on file    Stress: Not on file   Relationships    Social connections:     Talks on phone: Not on file     Gets together: Not on file     Attends [employer] service: Not on file     Active member of club or organization: Not on file     Attends meetings of clubs or organizations: Not on file     Relationship status: Not on file    Intimate partner violence:     Fear of current or ex partner: Not on file     Emotionally abused: Not on file     Physically abused: Not on file     Forced sexual activity: Not on file   Other Topics Concern    Not on file   Social History Narrative    Not on file       Current Outpatient Medications:     Ascorbic Acid (VITAMIN C) 100 MG tablet, Take 100 mg by mouth daily, Disp: , Rfl:     b complex vitamins tablet, Take 1 tablet by mouth daily, Disp: , Rfl:     cholecalciferol (VITAMIN D3) 400 units tablet, Take 400 Units by mouth daily, Disp: , Rfl:     glucosamine-chondroitin 500-400 MG tablet, Take 1 tablet by mouth 2 (two) times a day , Disp: , Rfl:     Multiple Vitamins-Minerals (MULTIVITAMIN WITH MINERALS) tablet, Take 1 tablet by mouth daily, Disp: , Rfl:     vitamin E 100 UNIT capsule, Take 100 Units by mouth daily, Disp: , Rfl:     fluocinonide (LIDEX) 0 05 % cream, Apply topically 2 (two) times a day To the small rash on your arms and back  DO NOT apply to the large wound on your back , Disp: 30 g, Rfl: 0    traMADol (ULTRAM) 50 mg tablet, Take 1 tablet (50 mg total) by mouth every 6 (six) hours as needed for moderate pain, Disp: 10 tablet, Rfl: 0    vitamin B-12 (CYANOCOBALAMIN) 100 MCG tablet, Take 100 mcg by mouth daily, Disp: , Rfl:   No Known Allergies  Vitals:    07/26/19 1318   BP: 140/80   Pulse: 72   Resp: 16   Temp: (!) 97 1 °F (36 2 °C)       Physical Exam   Constitutional: She is oriented to person, place, and time  She appears well-developed and well-nourished  HENT:   Head: Normocephalic and atraumatic  Right Ear: External ear normal    Left Ear: External ear normal    Eyes: Pupils are equal, round, and reactive to light  Neck: Normal range of motion  Neck supple  Cardiovascular: Normal rate, regular rhythm and normal heart sounds  Pulmonary/Chest: Breath sounds normal    Abdominal: Soft  Bowel sounds are normal    Musculoskeletal: Normal range of motion  Neurological: She is alert and oriented to person, place, and time  Skin: Skin is warm and dry  Negative for cervical, axillary, and inguinal adenopathy  Large 35 cm x 10 cm irregular pain granulating ulcerating tumor, involving right to mid back  Psychiatric: She has a normal mood and affect   Her behavior is normal  Thought content normal        Pathology:  Case Report   Surgical Pathology Report                         Case: [id number]                                    Authorizing Provider: Grace Guillaume MD         Collected:           07/17/2019 1419               Ordering Location:     [facility] Received:            07/17/2019 1431                                      Operating Room                                                                Pathologist:           Ana Callahan MD                                                              Specimen:    Back, Biopsy of back mass                                                                  Final Diagnosis   A  Skin Back, excisional biopsy of back mass:  - Ulcerated basosquamous carcinoma, extending into deep reticular dermis and superficial     subcutis (6 4 mm deep, Donell's level IV-V)  - No perineural or lymphovascular invasion identified   - Peripheral and deep peripheral resection margins involved by invasive carcinoma         Interpretation performed at [facility], [address] [place] [address]       Electronically signed by Ana Callahan MD on 7/24/2019 at 11:18 AM         Labs:  none    Imaging  Ct Chest Abdomen Pelvis W Contrast    Result Date: 7/16/2019  Narrative: CT CHEST, ABDOMEN AND PELVIS WITH IV CONTRAST INDICATION:   Bacteremia  COMPARISON:  None  TECHNIQUE: CT examination of the chest, abdomen and pelvis was performed  Axial, sagittal, and coronal 2D reformatted images were created from the source data and submitted for interpretation  Radiation dose length product (DLP) for this visit:  613 mGy-cm   This examination, like all CT scans performed in the [facility], was performed utilizing techniques to minimize radiation dose exposure, including the use of iterative reconstruction and automated exposure control  IV Contrast:  100 mL of iohexol (OMNIPAQUE) Enteric Contrast: Enteric contrast was administered  FINDINGS: CHEST LUNGS:  Lungs are clear  There is no tracheal or endobronchial lesion  PLEURA:  No effusion  HEART/GREAT VESSELS:  Normal heart size  No thoracic aortic aneurysm or dissection  MEDIASTINUM AND DELANEY:  No lymphadenopathy   CHEST WALL AND LOWER NECK:   Unremarkable  ABDOMEN LIVER/BILIARY TREE:  No biliary obstruction  GALLBLADDER:  Markedly distended gallbladder  Several gallstones in the fundus and neck    No gallbladder wall thickening or pericholecystic fluid  SPLEEN:  Unremarkable  PANCREAS:  Unremarkable  ADRENAL GLANDS:  Unremarkable  KIDNEYS/URETERS:  No pyelonephritis or obstructive uropathy  STOMACH AND BOWEL:  Diverticulosis throughout the colon without evidence of diverticulitis or colitis  Significant amount stool throughout the colon  No bowel obstruction  APPENDIX:  A normal appendix was visualized  ABDOMINOPELVIC CAVITY:  No ascites or free intraperitoneal air  No lymphadenopathy  VESSELS:  Mild calcified plaque in the abdominal aorta  PELVIS REPRODUCTIVE ORGANS:  No pelvic mass or fluid  URINARY BLADDER:  Unremarkable  ABDOMINAL WALL/INGUINAL REGIONS:  Unremarkable  OSSEOUS STRUCTURES:  No acute fracture or destructive osseous lesion  Impression: 1  No acute cardiopulmonary process  2   Cholelithiasis  Significant gallbladder distention without evidence of acute cholecystitis  Right upper quadrant ultrasound may be helpful if there is persistent concern for acute cholecystitis  3   Significant amount stool throughout the colon may indicate constipation  No evidence of colitis, diverticulitis or bowel obstruction  Workstation performed: [id number]     I reviewed the above laboratory and imaging data  Discussion/Summary:  16-year-old woman with joint basosquamous cell cancer  This is amenable to wide excision, with plans for reconstruction by Plastic surgery team   Rationale for this along with risks and benefits of surgery including infection, bleeding, and need for possible distal surgery, were discussed with patient and family  They understand the plan and wish to proceed as outlined  All questions answered and consents were signed at this visit  As part of metastatic workup, will obtain ultrasound of groins and axillae  CT scan was thus far negative

## 2019-07-31 ENCOUNTER — APPOINTMENT (OUTPATIENT)
Dept: PHYSICAL THERAPY | Facility: CLINIC | Age: 69
End: 2019-07-31
Payer: COMMERCIAL

## 2019-07-31 NOTE — PROGRESS NOTES
PT DISCHARGE:     Patient has unrelated health issues ongoing  She is having a surgery on August 22, 2019 and is not cleared to return to therapy until after that time  Spoke with patient today and let her know she would need updated script and medical clearance if she returns to therapy  Patient to call clinic with questions or concerns  No questions at this time

## 2019-08-01 ENCOUNTER — HOSPITAL ENCOUNTER (OUTPATIENT)
Dept: ULTRASOUND IMAGING | Facility: HOSPITAL | Age: 69
Discharge: HOME | End: 2019-08-01
Attending: SURGERY
Payer: COMMERCIAL

## 2019-08-01 DIAGNOSIS — C44.99 BASOSQUAMOUS CARCINOMA OF SKIN: ICD-10-CM

## 2019-08-01 PROCEDURE — 76705 ECHO EXAM OF ABDOMEN: CPT

## 2019-08-01 PROCEDURE — 76882 US LMTD JT/FCL EVL NVASC XTR: CPT

## 2019-08-06 ENCOUNTER — OFFICE VISIT (OUTPATIENT)
Dept: LAB | Facility: HOSPITAL | Age: 69
End: 2019-08-06
Attending: SURGERY
Payer: COMMERCIAL

## 2019-08-06 ENCOUNTER — TELEPHONE (OUTPATIENT)
Dept: HEMATOLOGY ONCOLOGY | Facility: CLINIC | Age: 69
End: 2019-08-06

## 2019-08-06 ENCOUNTER — APPOINTMENT (OUTPATIENT)
Dept: LAB | Facility: HOSPITAL | Age: 69
End: 2019-08-06
Attending: SURGERY
Payer: COMMERCIAL

## 2019-08-06 DIAGNOSIS — C44.99 BASOSQUAMOUS CARCINOMA OF SKIN: ICD-10-CM

## 2019-08-06 LAB
ALBUMIN SERPL BCP-MCNC: 3.7 G/DL (ref 3.5–5)
ALP SERPL-CCNC: 89 U/L (ref 46–116)
ALT SERPL W P-5'-P-CCNC: 23 U/L (ref 12–78)
ANION GAP SERPL CALCULATED.3IONS-SCNC: 5 MMOL/L (ref 4–13)
AST SERPL W P-5'-P-CCNC: 16 U/L (ref 5–45)
ATRIAL RATE: 69 BPM
BILIRUB SERPL-MCNC: 0.3 MG/DL (ref 0.2–1)
BUN SERPL-MCNC: 12 MG/DL (ref 5–25)
CALCIUM SERPL-MCNC: 9.6 MG/DL (ref 8.3–10.1)
CHLORIDE SERPL-SCNC: 102 MMOL/L (ref 100–108)
CO2 SERPL-SCNC: 30 MMOL/L (ref 21–32)
CREAT SERPL-MCNC: 0.84 MG/DL (ref 0.6–1.3)
ERYTHROCYTE [DISTWIDTH] IN BLOOD BY AUTOMATED COUNT: 12.8 % (ref 11.6–15.1)
GFR SERPL CREATININE-BSD FRML MDRD: 73 ML/MIN/1.73SQ M
GLUCOSE SERPL-MCNC: 99 MG/DL (ref 65–140)
HCT VFR BLD AUTO: 35.3 % (ref 34.8–46.1)
HGB BLD-MCNC: 11.4 G/DL (ref 11.5–15.4)
MCH RBC QN AUTO: 28.4 PG (ref 26.8–34.3)
MCHC RBC AUTO-ENTMCNC: 32.3 G/DL (ref 31.4–37.4)
MCV RBC AUTO: 88 FL (ref 82–98)
P AXIS: 73 DEGREES
PLATELET # BLD AUTO: 310 THOUSANDS/UL (ref 149–390)
PMV BLD AUTO: 9.1 FL (ref 8.9–12.7)
POTASSIUM SERPL-SCNC: 5.3 MMOL/L (ref 3.5–5.3)
PR INTERVAL: 156 MS
PROT SERPL-MCNC: 7.1 G/DL (ref 6.4–8.2)
QRS AXIS: 67 DEGREES
QRSD INTERVAL: 100 MS
QT INTERVAL: 386 MS
QTC INTERVAL: 413 MS
RBC # BLD AUTO: 4.02 MILLION/UL (ref 3.81–5.12)
SODIUM SERPL-SCNC: 137 MMOL/L (ref 136–145)
T WAVE AXIS: 45 DEGREES
VENTRICULAR RATE: 69 BPM
WBC # BLD AUTO: 4.88 THOUSAND/UL (ref 4.31–10.16)

## 2019-08-06 PROCEDURE — 85027 COMPLETE CBC AUTOMATED: CPT

## 2019-08-06 PROCEDURE — 36415 COLL VENOUS BLD VENIPUNCTURE: CPT

## 2019-08-06 PROCEDURE — 93010 ELECTROCARDIOGRAM REPORT: CPT | Performed by: INTERNAL MEDICINE

## 2019-08-06 PROCEDURE — 80053 COMPREHEN METABOLIC PANEL: CPT

## 2019-08-06 PROCEDURE — 93005 ELECTROCARDIOGRAM TRACING: CPT

## 2019-08-06 NOTE — TELEPHONE ENCOUNTER
Patients  called, he would like the results of his wifes us from 8/1  Can you please call him at [phone]

## 2019-08-07 ENCOUNTER — TELEPHONE (OUTPATIENT)
Dept: SURGICAL ONCOLOGY | Facility: CLINIC | Age: 69
End: 2019-08-07

## 2019-08-07 NOTE — TELEPHONE ENCOUNTER
Pt called regarding US results  Discussed that overall there were no suspicious nodes seen  I did mention that there was one left axillary node that looked asymmetric, but that it was not suspicious based on size  I explained that Dr Rae Mean would discuss this in more detail with him when he returns

## 2019-08-14 ENCOUNTER — TELEPHONE (OUTPATIENT)
Dept: HEMATOLOGY ONCOLOGY | Facility: CLINIC | Age: 69
End: 2019-08-14

## 2019-08-14 ENCOUNTER — TELEPHONE (OUTPATIENT)
Dept: SURGICAL ONCOLOGY | Facility: CLINIC | Age: 69
End: 2019-08-14

## 2019-08-14 NOTE — TELEPHONE ENCOUNTER
Patients daughter Ruston called stating that [place] have been experiencing bad back pain and is considering taking her to the hospital  She stated that she would like to discuss further   Sree can be reached at [phone]

## 2019-08-14 NOTE — TELEPHONE ENCOUNTER
Pt's daughter Booker Clark) called stating patients back is starting to spasm on occasion  Advised her to call [address] [facility] PCP to manage the spasms  Kaitlin Calderon stated she will call right away

## 2019-08-19 NOTE — PRE-PROCEDURE INSTRUCTIONS
Pre-Surgery Instructions:   Medication Instructions    Ascorbic Acid (VITAMIN C) 100 MG tablet Instructed patient per Anesthesia Guidelines   b complex vitamins tablet Instructed patient per Anesthesia Guidelines   cholecalciferol (VITAMIN D3) 400 units tablet Instructed patient per Anesthesia Guidelines   glucosamine-chondroitin 500-400 MG tablet Instructed patient per Anesthesia Guidelines   Multiple Vitamins-Minerals (MULTIVITAMIN WITH MINERALS) tablet Instructed patient per Anesthesia Guidelines   vitamin E 100 UNIT capsule Instructed patient per Anesthesia Guidelines  Spoke to pt  Medication list reviewed & instructed  As of 8/19 pt to stop all OTC vitamins / supplements  Instructed on tylenol only  Am DOS no meds  Pt quit smoking week of 8/12/19 - using rare CBD oil vapor  Advised no vapor am DOS  Pt understands  Showering instructions given by surgeon office, reviewed @ time of call   All instructions verbally understood by patient  No further questions

## 2019-08-22 ENCOUNTER — ANESTHESIA EVENT (OUTPATIENT)
Dept: PERIOP | Facility: HOSPITAL | Age: 69
End: 2019-08-22
Payer: COMMERCIAL

## 2019-08-22 ENCOUNTER — HOSPITAL ENCOUNTER (OUTPATIENT)
Facility: HOSPITAL | Age: 69
Setting detail: OBSERVATION
Discharge: HOME HEALTH | End: 2019-08-23
Attending: SURGERY | Admitting: PLASTIC SURGERY
Payer: COMMERCIAL

## 2019-08-22 ENCOUNTER — ANESTHESIA (OUTPATIENT)
Dept: PERIOP | Facility: HOSPITAL | Age: 69
End: 2019-08-22
Payer: COMMERCIAL

## 2019-08-22 DIAGNOSIS — C44.99 BASOSQUAMOUS CARCINOMA OF SKIN: ICD-10-CM

## 2019-08-22 LAB
ABO GROUP BLD: NORMAL
BLD GP AB SCN SERPL QL: NEGATIVE
RH BLD: POSITIVE
SPECIMEN EXPIRATION DATE: NORMAL

## 2019-08-22 PROCEDURE — 11606 EXC TR-EXT MAL+MARG >4 CM: CPT | Performed by: SURGERY

## 2019-08-22 PROCEDURE — 86901 BLOOD TYPING SEROLOGIC RH(D): CPT | Performed by: NURSE ANESTHETIST, CERTIFIED REGISTERED

## 2019-08-22 PROCEDURE — 88305 TISSUE EXAM BY PATHOLOGIST: CPT | Performed by: PATHOLOGY

## 2019-08-22 PROCEDURE — 86850 RBC ANTIBODY SCREEN: CPT | Performed by: NURSE ANESTHETIST, CERTIFIED REGISTERED

## 2019-08-22 PROCEDURE — 86900 BLOOD TYPING SEROLOGIC ABO: CPT | Performed by: NURSE ANESTHETIST, CERTIFIED REGISTERED

## 2019-08-22 RX ORDER — ONDANSETRON 2 MG/ML
INJECTION INTRAMUSCULAR; INTRAVENOUS AS NEEDED
Status: DISCONTINUED | OUTPATIENT
Start: 2019-08-22 | End: 2019-08-22 | Stop reason: SURG

## 2019-08-22 RX ORDER — NEOSTIGMINE METHYLSULFATE 1 MG/ML
INJECTION INTRAVENOUS AS NEEDED
Status: DISCONTINUED | OUTPATIENT
Start: 2019-08-22 | End: 2019-08-22 | Stop reason: SURG

## 2019-08-22 RX ORDER — OXYCODONE HYDROCHLORIDE 5 MG/1
10 TABLET ORAL EVERY 4 HOURS PRN
Status: DISCONTINUED | OUTPATIENT
Start: 2019-08-22 | End: 2019-08-23 | Stop reason: HOSPADM

## 2019-08-22 RX ORDER — DEXAMETHASONE SODIUM PHOSPHATE 10 MG/ML
INJECTION, SOLUTION INTRAMUSCULAR; INTRAVENOUS AS NEEDED
Status: DISCONTINUED | OUTPATIENT
Start: 2019-08-22 | End: 2019-08-22 | Stop reason: SURG

## 2019-08-22 RX ORDER — MIDAZOLAM HYDROCHLORIDE 1 MG/ML
INJECTION INTRAMUSCULAR; INTRAVENOUS AS NEEDED
Status: DISCONTINUED | OUTPATIENT
Start: 2019-08-22 | End: 2019-08-22 | Stop reason: SURG

## 2019-08-22 RX ORDER — HYDROMORPHONE HYDROCHLORIDE 2 MG/ML
INJECTION, SOLUTION INTRAMUSCULAR; INTRAVENOUS; SUBCUTANEOUS AS NEEDED
Status: DISCONTINUED | OUTPATIENT
Start: 2019-08-22 | End: 2019-08-22 | Stop reason: SURG

## 2019-08-22 RX ORDER — FENTANYL CITRATE 50 UG/ML
INJECTION, SOLUTION INTRAMUSCULAR; INTRAVENOUS AS NEEDED
Status: DISCONTINUED | OUTPATIENT
Start: 2019-08-22 | End: 2019-08-22 | Stop reason: SURG

## 2019-08-22 RX ORDER — EPHEDRINE SULFATE 50 MG/ML
INJECTION INTRAVENOUS AS NEEDED
Status: DISCONTINUED | OUTPATIENT
Start: 2019-08-22 | End: 2019-08-22 | Stop reason: SURG

## 2019-08-22 RX ORDER — HYDROMORPHONE HCL/PF 1 MG/ML
0.2 SYRINGE (ML) INJECTION
Status: DISCONTINUED | OUTPATIENT
Start: 2019-08-22 | End: 2019-08-23 | Stop reason: HOSPADM

## 2019-08-22 RX ORDER — CEFAZOLIN SODIUM 1 G/3ML
INJECTION, POWDER, FOR SOLUTION INTRAMUSCULAR; INTRAVENOUS AS NEEDED
Status: DISCONTINUED | OUTPATIENT
Start: 2019-08-22 | End: 2019-08-22 | Stop reason: SURG

## 2019-08-22 RX ORDER — BUPIVACAINE HYDROCHLORIDE 2.5 MG/ML
INJECTION, SOLUTION INFILTRATION; PERINEURAL AS NEEDED
Status: DISCONTINUED | OUTPATIENT
Start: 2019-08-22 | End: 2019-08-22 | Stop reason: HOSPADM

## 2019-08-22 RX ORDER — HYDROMORPHONE HCL/PF 1 MG/ML
0.5 SYRINGE (ML) INJECTION
Status: DISCONTINUED | OUTPATIENT
Start: 2019-08-22 | End: 2019-08-23

## 2019-08-22 RX ORDER — LIDOCAINE HYDROCHLORIDE 10 MG/ML
INJECTION, SOLUTION EPIDURAL; INFILTRATION; INTRACAUDAL; PERINEURAL AS NEEDED
Status: DISCONTINUED | OUTPATIENT
Start: 2019-08-22 | End: 2019-08-22 | Stop reason: HOSPADM

## 2019-08-22 RX ORDER — ALBUMIN, HUMAN INJ 5% 5 %
SOLUTION INTRAVENOUS CONTINUOUS PRN
Status: DISCONTINUED | OUTPATIENT
Start: 2019-08-22 | End: 2019-08-22 | Stop reason: SURG

## 2019-08-22 RX ORDER — HEPARIN SODIUM 5000 [USP'U]/ML
5000 INJECTION, SOLUTION INTRAVENOUS; SUBCUTANEOUS EVERY 8 HOURS SCHEDULED
Status: DISCONTINUED | OUTPATIENT
Start: 2019-08-23 | End: 2019-08-23 | Stop reason: HOSPADM

## 2019-08-22 RX ORDER — SODIUM CHLORIDE, SODIUM LACTATE, POTASSIUM CHLORIDE, CALCIUM CHLORIDE 600; 310; 30; 20 MG/100ML; MG/100ML; MG/100ML; MG/100ML
20 INJECTION, SOLUTION INTRAVENOUS CONTINUOUS
Status: DISCONTINUED | OUTPATIENT
Start: 2019-08-22 | End: 2019-08-23

## 2019-08-22 RX ORDER — CEFAZOLIN SODIUM 1 G/50ML
1000 SOLUTION INTRAVENOUS ONCE
Status: DISCONTINUED | OUTPATIENT
Start: 2019-08-22 | End: 2019-08-23 | Stop reason: HOSPADM

## 2019-08-22 RX ORDER — CEFAZOLIN SODIUM 1 G/50ML
1000 SOLUTION INTRAVENOUS EVERY 8 HOURS
Status: DISCONTINUED | OUTPATIENT
Start: 2019-08-23 | End: 2019-08-23

## 2019-08-22 RX ORDER — GLYCOPYRROLATE 0.2 MG/ML
INJECTION INTRAMUSCULAR; INTRAVENOUS AS NEEDED
Status: DISCONTINUED | OUTPATIENT
Start: 2019-08-22 | End: 2019-08-22 | Stop reason: SURG

## 2019-08-22 RX ORDER — ONDANSETRON 2 MG/ML
4 INJECTION INTRAMUSCULAR; INTRAVENOUS ONCE AS NEEDED
Status: DISCONTINUED | OUTPATIENT
Start: 2019-08-22 | End: 2019-08-23 | Stop reason: HOSPADM

## 2019-08-22 RX ORDER — LIDOCAINE HYDROCHLORIDE 10 MG/ML
0.5 INJECTION, SOLUTION EPIDURAL; INFILTRATION; INTRACAUDAL; PERINEURAL ONCE AS NEEDED
Status: COMPLETED | OUTPATIENT
Start: 2019-08-22 | End: 2019-08-22

## 2019-08-22 RX ORDER — PROPOFOL 10 MG/ML
INJECTION, EMULSION INTRAVENOUS AS NEEDED
Status: DISCONTINUED | OUTPATIENT
Start: 2019-08-22 | End: 2019-08-22 | Stop reason: SURG

## 2019-08-22 RX ORDER — MAGNESIUM HYDROXIDE 1200 MG/15ML
LIQUID ORAL AS NEEDED
Status: DISCONTINUED | OUTPATIENT
Start: 2019-08-22 | End: 2019-08-22 | Stop reason: HOSPADM

## 2019-08-22 RX ORDER — ROCURONIUM BROMIDE 10 MG/ML
INJECTION, SOLUTION INTRAVENOUS AS NEEDED
Status: DISCONTINUED | OUTPATIENT
Start: 2019-08-22 | End: 2019-08-22 | Stop reason: SURG

## 2019-08-22 RX ORDER — FENTANYL CITRATE/PF 50 MCG/ML
25 SYRINGE (ML) INJECTION
Status: DISCONTINUED | OUTPATIENT
Start: 2019-08-22 | End: 2019-08-23 | Stop reason: HOSPADM

## 2019-08-22 RX ORDER — ACETAMINOPHEN 500 MG
1000 TABLET ORAL EVERY 6 HOURS PRN
COMMUNITY

## 2019-08-22 RX ORDER — MINERAL OIL
OIL (ML) MISCELLANEOUS AS NEEDED
Status: DISCONTINUED | OUTPATIENT
Start: 2019-08-22 | End: 2019-08-22 | Stop reason: HOSPADM

## 2019-08-22 RX ORDER — LIDOCAINE HYDROCHLORIDE 10 MG/ML
0.5 INJECTION, SOLUTION EPIDURAL; INFILTRATION; INTRACAUDAL; PERINEURAL ONCE AS NEEDED
Status: DISCONTINUED | OUTPATIENT
Start: 2019-08-22 | End: 2019-08-23 | Stop reason: HOSPADM

## 2019-08-22 RX ORDER — ACETAMINOPHEN 325 MG/1
650 TABLET ORAL EVERY 6 HOURS PRN
Status: DISCONTINUED | OUTPATIENT
Start: 2019-08-22 | End: 2019-08-23 | Stop reason: HOSPADM

## 2019-08-22 RX ORDER — OXYCODONE HYDROCHLORIDE 5 MG/1
5 TABLET ORAL EVERY 4 HOURS PRN
Status: DISCONTINUED | OUTPATIENT
Start: 2019-08-22 | End: 2019-08-23 | Stop reason: HOSPADM

## 2019-08-22 RX ADMIN — ALBUMIN (HUMAN): 12.5 SOLUTION INTRAVENOUS at 19:20

## 2019-08-22 RX ADMIN — PHENYLEPHRINE HYDROCHLORIDE 100 MCG: 10 INJECTION INTRAVENOUS at 18:54

## 2019-08-22 RX ADMIN — PHENYLEPHRINE HYDROCHLORIDE 100 MCG: 10 INJECTION INTRAVENOUS at 19:46

## 2019-08-22 RX ADMIN — ONDANSETRON 4 MG: 2 INJECTION INTRAMUSCULAR; INTRAVENOUS at 22:21

## 2019-08-22 RX ADMIN — EPHEDRINE SULFATE 5 MG: 50 INJECTION, SOLUTION INTRAVENOUS at 19:07

## 2019-08-22 RX ADMIN — ALBUMIN (HUMAN): 12.5 SOLUTION INTRAVENOUS at 19:39

## 2019-08-22 RX ADMIN — EPHEDRINE SULFATE 10 MG: 50 INJECTION, SOLUTION INTRAVENOUS at 22:30

## 2019-08-22 RX ADMIN — MIDAZOLAM 2 MG: 1 INJECTION INTRAMUSCULAR; INTRAVENOUS at 18:29

## 2019-08-22 RX ADMIN — CEFAZOLIN 2000 MG: 1 INJECTION, POWDER, FOR SOLUTION INTRAVENOUS at 18:45

## 2019-08-22 RX ADMIN — ONDANSETRON 4 MG: 2 INJECTION INTRAMUSCULAR; INTRAVENOUS at 18:46

## 2019-08-22 RX ADMIN — LIDOCAINE HYDROCHLORIDE 0.5 ML: 10 INJECTION, SOLUTION EPIDURAL; INFILTRATION; INTRACAUDAL; PERINEURAL at 13:55

## 2019-08-22 RX ADMIN — FENTANYL CITRATE 50 MCG: 50 INJECTION, SOLUTION INTRAMUSCULAR; INTRAVENOUS at 19:09

## 2019-08-22 RX ADMIN — PHENYLEPHRINE HYDROCHLORIDE 100 MCG: 10 INJECTION INTRAVENOUS at 19:20

## 2019-08-22 RX ADMIN — PHENYLEPHRINE HYDROCHLORIDE 200 MCG: 10 INJECTION INTRAVENOUS at 22:33

## 2019-08-22 RX ADMIN — PHENYLEPHRINE HYDROCHLORIDE 30 MCG/MIN: 10 INJECTION INTRAVENOUS at 20:06

## 2019-08-22 RX ADMIN — PHENYLEPHRINE HYDROCHLORIDE 200 MCG: 10 INJECTION INTRAVENOUS at 19:30

## 2019-08-22 RX ADMIN — DEXAMETHASONE SODIUM PHOSPHATE 4 MG: 10 INJECTION, SOLUTION INTRAMUSCULAR; INTRAVENOUS at 18:46

## 2019-08-22 RX ADMIN — NEOSTIGMINE METHYLSULFATE 3 MG: 1 INJECTION, SOLUTION INTRAVENOUS at 22:21

## 2019-08-22 RX ADMIN — LIDOCAINE HYDROCHLORIDE 100 MG: 20 INJECTION, SOLUTION INTRAVENOUS at 18:37

## 2019-08-22 RX ADMIN — PROPOFOL 120 MG: 10 INJECTION, EMULSION INTRAVENOUS at 18:37

## 2019-08-22 RX ADMIN — PROPOFOL 30 MG: 10 INJECTION, EMULSION INTRAVENOUS at 18:39

## 2019-08-22 RX ADMIN — EPHEDRINE SULFATE 5 MG: 50 INJECTION, SOLUTION INTRAVENOUS at 19:46

## 2019-08-22 RX ADMIN — ROCURONIUM BROMIDE 20 MG: 50 INJECTION, SOLUTION INTRAVENOUS at 19:42

## 2019-08-22 RX ADMIN — SODIUM CHLORIDE, SODIUM LACTATE, POTASSIUM CHLORIDE, AND CALCIUM CHLORIDE 20 ML/HR: .6; .31; .03; .02 INJECTION, SOLUTION INTRAVENOUS at 13:55

## 2019-08-22 RX ADMIN — PHENYLEPHRINE HYDROCHLORIDE 100 MCG: 10 INJECTION INTRAVENOUS at 18:48

## 2019-08-22 RX ADMIN — LIDOCAINE HYDROCHLORIDE: 10 INJECTION, SOLUTION EPIDURAL; INFILTRATION; INTRACAUDAL; PERINEURAL at 22:00

## 2019-08-22 RX ADMIN — PHENYLEPHRINE HYDROCHLORIDE 100 MCG: 10 INJECTION INTRAVENOUS at 19:07

## 2019-08-22 RX ADMIN — EPHEDRINE SULFATE 5 MG: 50 INJECTION, SOLUTION INTRAVENOUS at 18:48

## 2019-08-22 RX ADMIN — EPHEDRINE SULFATE 5 MG: 50 INJECTION, SOLUTION INTRAVENOUS at 18:54

## 2019-08-22 RX ADMIN — ROCURONIUM BROMIDE 30 MG: 50 INJECTION, SOLUTION INTRAVENOUS at 18:37

## 2019-08-22 RX ADMIN — FENTANYL CITRATE 50 MCG: 50 INJECTION, SOLUTION INTRAMUSCULAR; INTRAVENOUS at 18:37

## 2019-08-22 RX ADMIN — EPHEDRINE SULFATE 5 MG: 50 INJECTION, SOLUTION INTRAVENOUS at 19:20

## 2019-08-22 RX ADMIN — PROPOFOL 50 MG: 10 INJECTION, EMULSION INTRAVENOUS at 19:40

## 2019-08-22 RX ADMIN — GLYCOPYRROLATE 0.4 MG: 0.2 INJECTION, SOLUTION INTRAMUSCULAR; INTRAVENOUS at 22:21

## 2019-08-22 RX ADMIN — ROCURONIUM BROMIDE 20 MG: 50 INJECTION, SOLUTION INTRAVENOUS at 19:02

## 2019-08-22 RX ADMIN — HYDROMORPHONE HYDROCHLORIDE 1 MG: 2 INJECTION, SOLUTION INTRAMUSCULAR; INTRAVENOUS; SUBCUTANEOUS at 20:16

## 2019-08-22 NOTE — INTERVAL H&P NOTE
H&P reviewed  After examining the patient I find no changes in the patients condition since the H&P had been written      Vitals:    08/22/19 1320   BP: 138/71   Pulse: 73   Resp: 20   Temp: 98 °F (36 7 °C)   SpO2: 99%

## 2019-08-22 NOTE — ANESTHESIA PREPROCEDURE EVALUATION
Review of Systems/Medical History  Patient summary reviewed  Chart reviewed  No history of anesthetic complications     Cardiovascular  Exercise tolerance (METS): <4,  No hypertension ,    Pulmonary  Negative pulmonary ROS Not a smoker , No asthma , No recent URI , No sleep apnea ,        GI/Hepatic  Negative GI/hepatic ROS          Negative  ROS        Endo/Other  Negative endo/other ROS      GYN  Negative gynecology ROS          Hematology  Negative hematology ROS      Musculoskeletal  Negative musculoskeletal ROS        Neurology  Negative neurology ROS   No TIA, No CVA ,    Psychology   Negative psychology ROS              Physical Exam    Airway    Mallampati score: II  TM Distance: >3 FB       Dental   No notable dental hx     Cardiovascular      Pulmonary      Other Findings        Anesthesia Plan  ASA Score- 2     Anesthesia Type- general with ASA Monitors  Additional Monitors:   Airway Plan: ETT  Comment:  YOBANY Butler , have personally seen and evaluated the patient prior to anesthetic care  I have reviewed the pre-anesthetic record, and other medical records if appropriate to the anesthetic care  If a CRNA is involved in the case, I have reviewed the CRNA assessment, if present, and agree  Risks/benefits and alternatives discussed with patient including possible PONV, sore throat, and possibility of rare anesthetic and surgical emergencies        Plan Factors- Patient instructed to abstain from smoking on day of procedure  Patient did not smoke on day of surgery  Induction- intravenous  Postoperative Plan- Plan for postoperative opioid use  Planned trial extubation    Informed Consent- Anesthetic plan and risks discussed with patient  I personally reviewed this patient with the CRNA  Discussed and agreed on the Anesthesia Plan with the CRNA  Shlomo Zaragoza

## 2019-08-23 VITALS
OXYGEN SATURATION: 100 % | TEMPERATURE: 99.1 F | BODY MASS INDEX: 29.8 KG/M2 | WEIGHT: 168.21 LBS | RESPIRATION RATE: 16 BRPM | HEIGHT: 63 IN | SYSTOLIC BLOOD PRESSURE: 120 MMHG | DIASTOLIC BLOOD PRESSURE: 65 MMHG | HEART RATE: 78 BPM

## 2019-08-23 PROCEDURE — 99024 POSTOP FOLLOW-UP VISIT: CPT | Performed by: SURGERY

## 2019-08-23 RX ORDER — OXYCODONE HYDROCHLORIDE AND ACETAMINOPHEN 5; 325 MG/1; MG/1
1 TABLET ORAL EVERY 4 HOURS PRN
Qty: 10 TABLET | Refills: 0 | Status: SHIPPED | OUTPATIENT
Start: 2019-08-23 | End: 2019-09-02

## 2019-08-23 RX ORDER — CEPHALEXIN 500 MG/1
500 CAPSULE ORAL EVERY 6 HOURS SCHEDULED
Qty: 8 CAPSULE | Refills: 0 | Status: SHIPPED | OUTPATIENT
Start: 2019-08-23 | End: 2019-08-25

## 2019-08-23 RX ORDER — MINERAL OIL AND PETROLATUM 150; 830 MG/G; MG/G
OINTMENT OPHTHALMIC AS NEEDED
Status: DISCONTINUED | OUTPATIENT
Start: 2019-08-23 | End: 2019-08-23

## 2019-08-23 RX ORDER — CEFAZOLIN SODIUM 1 G/50ML
1000 SOLUTION INTRAVENOUS EVERY 8 HOURS
Status: COMPLETED | OUTPATIENT
Start: 2019-08-23 | End: 2019-08-23

## 2019-08-23 RX ADMIN — CEFAZOLIN SODIUM 1000 MG: 1 SOLUTION INTRAVENOUS at 10:13

## 2019-08-23 RX ADMIN — HEPARIN SODIUM 5000 UNITS: 5000 INJECTION INTRAVENOUS; SUBCUTANEOUS at 06:40

## 2019-08-23 RX ADMIN — OXYCODONE HYDROCHLORIDE 10 MG: 5 TABLET ORAL at 10:15

## 2019-08-23 RX ADMIN — HYDROMORPHONE HYDROCHLORIDE 0.5 MG: 1 INJECTION, SOLUTION INTRAMUSCULAR; INTRAVENOUS; SUBCUTANEOUS at 06:44

## 2019-08-23 RX ADMIN — OXYCODONE HYDROCHLORIDE 10 MG: 5 TABLET ORAL at 14:15

## 2019-08-23 RX ADMIN — HEPARIN SODIUM 5000 UNITS: 5000 INJECTION INTRAVENOUS; SUBCUTANEOUS at 13:12

## 2019-08-23 RX ADMIN — CEFAZOLIN SODIUM 1000 MG: 1 SOLUTION INTRAVENOUS at 02:32

## 2019-08-23 RX ADMIN — HYDROMORPHONE HYDROCHLORIDE 0.5 MG: 1 INJECTION, SOLUTION INTRAMUSCULAR; INTRAVENOUS; SUBCUTANEOUS at 03:24

## 2019-08-23 NOTE — DISCHARGE INSTRUCTIONS
[address], [address] [facility], [address] [facility], [place], [address] [facility] /[facility] / [email or web address]  [facility]       Leave blue dressing in place, do not remove it    Do not lay on blue dressing or put pressure on blue dressing    Keep the blue dressing dry    No bathing    No ice or heating pack    Remove the right thigh wrap on Saturday, remove the white dressing and leave the yellow dressing in place    No exercise or outdoor activities    No strenuous activity    No repetitive activities like cleaning, gardening or walking pets    Call [phone] for an appointment in 4-7 days

## 2019-08-23 NOTE — UTILIZATION REVIEW
Initial Clinical Review    Elective OBSERAVATION surgical procedure    Age/Sex: 81 y o  female     Surgery Date: 8/22/19    Procedure:   Preop Diagnosis:  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]     Post-Op Diagnosis Codes: * 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]     Procedure(s) (LRB):  EXCISION WIDE LESION RIGHT BACK (Bilateral)  COMPLEX WOUND CLOSURE TO EXTREMITY (Bilateral)  SKIN GRAFT FULL THICKNESS  (FTSG) EXTREMITY (N/A)  DEFECT, RIGHT BACK  DEFECT 30 CM X 22 CM X 2 CM    Anesthesia: GENERAL    Operative Findings: BASAL SQUAMOUS CANCER, RIGHT BACK, WITH RESULTING DEFECT MEASURING 30 X 22 BY 2 CM     Admission Orders: Date/Time/Statement:  OBSERVATION 8/22/19 @ 1708  Orders Placed This Encounter   Procedures    Place in Observation     Standing Status:   Standing     Number of Occurrences:   1     Order Specific Question:   Admitting Physician     Answer:   Elo Bone     Order Specific Question:   Level of Care     Answer:   Med Surg [16]     Vital Signs: /75   Pulse 82   Temp (!) 97 2 °F (36 2 °C)   Resp 16   Ht 5' 3" (1 6 m)   Wt 76 3 kg (168 lb 3 4 oz)   SpO2 97%   BMI 29 80 kg/m²      Diet: REGULAR     Mobility: UP AS TOLERATED    DVT Prophylaxis: SCDs, HEPARIN SQ    Medications/Pain Control:   Current Facility-Administered Medications:  acetaminophen 650 mg Oral Q6H PRN    dextran 70-hypromellose 2 drop Both Eyes Q3H PRN    heparin (porcine) 5,000 Units Subcutaneous Q8H [facility]    HYDROmorphone 0 5 mg Intravenous Q3H PRN X2 8/23   oxyCODONE 10 mg Oral Q4H PRN X 1 8/23   oxyCODONE 5 mg Oral Q4H PRN        Network Utilization Review Department  Phone: [phone]; Fax [phone]  [email or web address]  ATTENTION: Please call with any questions or concerns to [phone]  and carefully listen to the prompts so that you are directed to the right person     Send all requests for admission clinical reviews, approved or denied determinations and any other requests to fax [phone]   All voicemails are confidential

## 2019-08-23 NOTE — CONSULTS
ANESTHESIA CONSULT    Anesthesia was consulted to examine patient for possible corneal abrasion  Mrs Johnie Cockayne came out of surgery late last night and when she woke up this morning around 4am she noticed her vision was blurry out of her right eye  This has since improved and blamed her blurry vision on her being tired and not sleeping well  I explained to her the symptoms of corneal abrasion i e  eye pain, gritty feeling in the eye, tearing, photophobia, redness, etc all of which she denies  She states she had a corneal abrasion in the past and this is not at all similar to those symptoms  She has a follow up appointment with her ophthmologist early next month and stated she would bring it up at the appointment if she has symptoms  I offered to bring her downstairs to the holding area to evaluate her eye with fluorscein dye and a Wood's lamp but she declined stating that her vision is improving and that she was confident this wasn't a corneal abrasion  I urged her to contact her ophthalmologist or return to the ED if her symptoms worsen  Please contact the on-call anesthesiologist at [phone] with additional questions

## 2019-08-23 NOTE — PLAN OF CARE
Problem: PAIN - ADULT  Goal: Verbalizes/displays adequate comfort level or baseline comfort level  Description  Interventions:  - Encourage patient to monitor pain and request assistance  - Assess pain using appropriate pain scale  - Administer analgesics based on type and severity of pain and evaluate response  - Implement non-pharmacological measures as appropriate and evaluate response  - Consider cultural and social influences on pain and pain management  - Notify physician/advanced practitioner if interventions unsuccessful or patient reports new pain  Outcome: Progressing     Problem: INFECTION - ADULT  Goal: Absence or prevention of progression during hospitalization  Description  INTERVENTIONS:  - Assess and monitor for signs and symptoms of infection  - Monitor lab/diagnostic results  - Monitor all insertion sites, i e  indwelling lines, tubes, and drains  - Monitor endotracheal if appropriate and nasal secretions for changes in amount and color  - [place] appropriate cooling/warming therapies per order  - Administer medications as ordered  - Instruct and encourage patient and family to use good hand hygiene technique  - Identify and instruct in appropriate isolation precautions for identified infection/condition  Outcome: Progressing     Problem: SAFETY ADULT  Goal: Patient will remain free of falls  Description  INTERVENTIONS:  - Assess patient frequently for physical needs  -  Identify cognitive and physical deficits and behaviors that affect risk of falls    -  [place] fall precautions as indicated by assessment   - Educate patient/family on patient safety including physical limitations  - Instruct patient to call for assistance with activity based on assessment  - Modify environment to reduce risk of injury  - Consider OT/PT consult to assist with strengthening/mobility  Outcome: Progressing     Problem: DISCHARGE PLANNING  Goal: Discharge to home or other facility with appropriate resources  Description  INTERVENTIONS:  - Identify barriers to discharge w/patient and caregiver  - Arrange for needed discharge resources and transportation as appropriate  - Identify discharge learning needs (meds, wound care, etc )  - Arrange for interpretive services to assist at discharge as needed  - Refer to Case Management Department for coordinating discharge planning if the patient needs post-hospital services based on physician/advanced practitioner order or complex needs related to functional status, cognitive ability, or social support system  Outcome: Progressing

## 2019-08-23 NOTE — PROGRESS NOTES
Progress Note - Debbe Hence 1950, 81 y o  female MRN: [id number]    Unit/Bed#: [facility] 918-01 Encounter: [id number]    Primary Care Provider: Minda Dubois MD   Date and time admitted to hospital: 8/22/2019 12:37 PM        * Basosquamous carcinoma of skin  Assessment & Plan  46VQ F s/p wide excision of basosquamous cell carcinoma with subsequent partial closure and STSG  Plan:  Regular  Pain control PRN  Eye drops to R eye as needed   Anesthesia c/s for R eye pain  D/c hodges  PT  Anticipate d/c home later today                Subjective/Objective     Subjective:   Redgie Session  Patient c/o L hip pain and R eye irritation this morning  Possibly 2/2 taping during procedure yesterday  Objective:     Blood pressure 118/64, pulse 75, temperature 97 5 °F (36 4 °C), resp  rate 20, height 5' 3" (1 6 m), weight 76 3 kg (168 lb 3 4 oz), SpO2 95 %  ,Body mass index is 29 8 kg/m²  Intake/Output Summary (Last 24 hours) at 8/23/2019 0556  Last data filed at 8/23/2019 0300  Gross per 24 hour   Intake 706 67 ml   Output 1600 ml   Net -893 33 ml       Invasive Devices     Peripheral Intravenous Line            Peripheral IV 07/17/19 Distal;Dorsal (posterior); Right Forearm 36 days    Peripheral IV 08/22/19 Left Hand less than 1 day    Peripheral IV 08/22/19 Right Hand less than 1 day          Drain            Urethral Catheter Non-latex 16 Fr  less than 1 day                Physical Exam:   Gen: NAD, resting in bed  HEENT: MMM, EOMI  CV: RRR  Lungs: nl effort  Abd: soft, NT/ND  Back: Bulky surgical dressing in place  Ext: no CCE, donor site wrapped with xeroform/abd pad/ace wrap  Skin: no rashes  Neuro: A&Ox3, motor and sensation grossly intact  Psych: appropriate     Lab, Imaging and other studies:I have personally reviewed pertinent lab results      VTE Pharmacologic Prophylaxis: Heparin  VTE Mechanical Prophylaxis: sequential compression device

## 2019-08-23 NOTE — OP NOTE
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]    Procedure(s) (LRB):  EXCISION WIDE LESION RIGHT BACK (Bilateral)  COMPLEX WOUND CLOSURE TO EXTREMITY (Bilateral)  SKIN GRAFT FULL THICKNESS  (FTSG) EXTREMITY (N/A)  DEFECT, RIGHT BACK  DEFECT 30 CM X 22 CM X 2 CM    Specimen(s):  ID Type Source Tests Collected by Time Destination   1 : RIGHT BACK BASAL CELL CANCER Tissue Lesion TISSUE EXAM Delmy Boston MD 2019    2 : RIGHT BACK BASAL CELL CANCER Tissue Lesion TISSUE EXAM Delmy Boston MD 2019        Estimated Blood Loss:   Minimal    Drains:  * No LDAs found *    Anesthesia Type:   General    Operative Indications:  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]      Operative Findings:  BASAL SQUAMOUS CANCER, RIGHT BACK, WITH RESULTING DEFECT MEASURING 30 X 22 BY 2 CM    Complications:   None    Procedure and Technique:  THE PATIENT IS A 81YEAR-OLD WOMAN WITH A LARGE BASAL SQUAMOUS CANCER OF THE RIGHT BACK  SHE COMES IN FOR EXCISION TODAY  GIVEN THE ANTICIPATED SIZE OF DEFECT, THIS WAS TO BE DONE IN CONJUNCTION WITH PLASTIC SURGERY TEAM     The patient was brought to the OR and identified by proper time-out  Following this she was intubated by the anesthesia team, the position in the left lateral decubitus position  The back lesion was identified, and 2 cm margins were drawn around it  The back, flank, and thigh were prepped and draped in usual fashion  The lines were incised sharply after skin was anesthetized  Cautery was used to dissect the dermis and then a full-thickness excision  Specimen was taken all way down to muscle fascia    We proceeded mobilize the lesion on bloc with the rim of normal tissue in cm fat off the muscle fascia  It became apparent ports central portion of the specimen that the tumor involved portion of the latissimus dorsi muscle  We therefore went beyond muscle fascia and excised latissimus dorsi muscle fibers in order to get a negative margin below the tumor  Small perforating vessels were cauterized as needed  Upon excision of the mass, the defect measured 30 x 22 x 2 cm in size  At this point, Dr Billie Thakur came in for the reconstructive portion of the operation  Sponge and instrument counts were correct at the end the case      I was present for the entire procedure    Patient Disposition:  PACU     SIGNATURE: Taras Sommers MD  DATE: August 22, 2019  TIME: 9:09 PM

## 2019-08-23 NOTE — PERIOPERATIVE NURSING NOTE
Arrived PACU on left side in bed  Large bulky  midline back drsg intact  Patient instructed to stay on side  Right thigh drsg intact ace wrap intact  Mckeon inserted without difficulty

## 2019-08-23 NOTE — ASSESSMENT & PLAN NOTE
72yo F s/p wide excision of basosquamous cell carcinoma with subsequent partial closure and STSG      Plan:  Regular  Pain control PRN  D/c home today

## 2019-08-23 NOTE — PLAN OF CARE
Problem: PAIN - ADULT  Goal: Verbalizes/displays adequate comfort level or baseline comfort level  Description  Interventions:  - Encourage patient to monitor pain and request assistance  - Assess pain using appropriate pain scale  - Administer analgesics based on type and severity of pain and evaluate response  - Implement non-pharmacological measures as appropriate and evaluate response  - Consider cultural and social influences on pain and pain management  - Notify physician/advanced practitioner if interventions unsuccessful or patient reports new pain  Outcome: Progressing     Problem: INFECTION - ADULT  Goal: Absence or prevention of progression during hospitalization  Description  INTERVENTIONS:  - Assess and monitor for signs and symptoms of infection  - Monitor lab/diagnostic results  - Monitor all insertion sites, i e  indwelling lines, tubes, and drains  - Monitor endotracheal if appropriate and nasal secretions for changes in amount and color  - [place] appropriate cooling/warming therapies per order  - Administer medications as ordered  - Instruct and encourage patient and family to use good hand hygiene technique  - Identify and instruct in appropriate isolation precautions for identified infection/condition  Outcome: Progressing     Problem: SAFETY ADULT  Goal: Patient will remain free of falls  Description  INTERVENTIONS:  - Assess patient frequently for physical needs  -  Identify cognitive and physical deficits and behaviors that affect risk of falls    -  [place] fall precautions as indicated by assessment   - Educate patient/family on patient safety including physical limitations  - Instruct patient to call for assistance with activity based on assessment  - Modify environment to reduce risk of injury  - Consider OT/PT consult to assist with strengthening/mobility  Outcome: Progressing     Problem: DISCHARGE PLANNING  Goal: Discharge to home or other facility with appropriate resources  Description  INTERVENTIONS:  - Identify barriers to discharge w/patient and caregiver  - Arrange for needed discharge resources and transportation as appropriate  - Identify discharge learning needs (meds, wound care, etc )  - Arrange for interpretive services to assist at discharge as needed  - Refer to Case Management Department for coordinating discharge planning if the patient needs post-hospital services based on physician/advanced practitioner order or complex needs related to functional status, cognitive ability, or social support system  Outcome: Progressing

## 2019-08-23 NOTE — NURSING NOTE
Pt c/o of right eye pain  We tried the artificial tears ointment and she doesn't like it, states its not helping her  I flushed her eye with saline  She said it felt a little better but burns  She is worried that she has a scratched cornea

## 2019-08-23 NOTE — RESTORATIVE TECHNICIAN NOTE
Restorative Specialist Mobility Note       Activity: Ambulate in gracia, Ambulate in room, Bathroom privileges, Chair, Dangle, Stand at bedside(Educated/encouraged pt to ambulate with assistance 3-4 x's/day   Pt callbell, phone/tray within reach )     Assistive Device: Front wheel walker, None          Mana ALEXANDRE, Restorative Technician, [employer] [facility]

## 2019-08-23 NOTE — SOCIAL WORK
Met with pt and discussed role of CM  Pt lives with her  in a 2-story home with 11 steps at entrance  Pt is independent in ADLs  Pt has DME including: cane  Pt currently open to [facility]  Pt agreeable with referral for resumption of care  Referral made via [facility]  Preference for pharmacy is [facility] in [place], [place]  No MH/D&A tx hx  No POA  Main contact: HusbandSally Finn ([phone])  CM reviewed d/c planning process including the following: identifying help at home, patient preference for d/c planning needs, Discharge Lounge, [facility] Meds to Bed program, availability of treatment team to discuss questions or concerns patient and/or family may have regarding understanding medications and recognizing signs and symptoms once discharged  CM also encouraged patient to follow up with all recommended appointments after discharge  Patient advised of importance for patient and family to participate in managing patients medical well being  Patient/caregiver received discharge checklist  Content reviewed  Patient/caregiver encouraged to participate in discharge plan of care prior to discharge home

## 2019-08-23 NOTE — DISCHARGE SUMMARY
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]    HPI: The patient is a 80-year-old woman who years ago noticed a bug bite on her right back  She treated this with creams and topical ointments  The lesion never really went away and progressed over the last several years  This came to the attention of her family approximately 3 months ago  A large ulcerated lesion was noted on her back  This culminated in a biopsy confirming basal squamous cancer  She has been referred for evaluation and treatment        Procedures Performed: No orders of the defined types were placed in this encounter  Summary of Hospital Course: 8/22 SCC wide excision, STSG  She did well overnight  Her hodges was removed in the morning and fluids stopped  She was able to void on her own, tolerate PO, pain was controlled  The next morning she was complaining of right eye pain and blurriness so we had anesthesia evaluate her  She has VNA set up at home already  She is now stable for discharge home  She will follow up with Dr JULIANE Hansen and Dr Kt Kenny    Significant Findings, Care, Treatment and Services Provided:  8/22 SCC wide excision, STSG    Complications: None    Discharge Diagnosis: 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]    Resolved Problems  Date Reviewed: 8/22/2019    None          Condition at Discharge: good         Discharge instructions/Information to patient and family:   See after visit summary for information provided to patient and family  Provisions for Follow-Up Care:  See after visit summary for information related to follow-up care and any pertinent home health orders        PCP: Dinh Espinosa MD    Disposition: See After Visit Summary for discharge disposition information  Planned Readmission: No    Discharge Statement   I spent 30 minutes discharging the patient  This time was spent on the day of discharge  I had direct contact with the patient on the day of discharge  Additional documentation is required if more than 30 minutes were spent on discharge  Discharge Medications:  See after visit summary for reconciled discharge medications provided to patient and family

## 2019-08-26 NOTE — UTILIZATION REVIEW
This is a Notification of Observation Care Status to our facility [place] [address]  Be advised that this patient was admitted to our facility under Observation Status  Please contact the Utilization Review Department where the patient is receiving care services for additional admission information  Place of Service Code: 25  Place of Service Name: [facility]  CPT Code for Observation: CPT Emely Prudent / CPT 43122    Presentation Date & Time: 8/22/2019 12:37 PM  Observation Admission Date & Time: 08/22/19 at [address]  Discharge Date & Time: 8/23/2019  6:12 PM   Discharge Disposition (if discharged): Home with [facility]  Attending Physician: Donato Berry Md [[id number]]  Admission Orders (From admission, onward)     Ordered        08/22/19 2302  Place in Observation  Once                   Facility: [address])  [facility] Utilization Review Department  Phone: [phone]; Fax [phone]  [email or web address]  ATTENTION: Please call with any questions or concerns to [phone]  and carefully listen to the prompts so that you are directed to the right person  Send all requests for admission clinical reviews, approved or denied determinations and any other requests to fax [phone]   All voicemails are confidential

## 2019-08-27 NOTE — OP NOTE
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]    Procedure: Back wound closure with local flap and split thickness skin graft    Specimen(s):  ID Type Source Tests Collected by Time Destination   1 : RIGHT BACK BASAL CELL CANCER Tissue Lesion TISSUE EXAM Wm Munroe MD 2019    2 : RIGHT BACK BASAL CELL CANCER Tissue Soft Tissue, Other TISSUE EXAM Wm Munroe MD 2019        Estimated Blood Loss:   200 mL    Drains:  * No LDAs found *    Anesthesia Type:   General    Operative Indications:  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]      Operative Findings:      Complications:   None    Procedure and Technique:  The patient had already underwent excision of her back and right flank lesion  This will be dictated separately  She had already received her antibiotics and all appropriate pressure precautions were taken  The back right flank and right thigh were prepped and draped using standard surgical technique  Attention was turned to the back and flank wound  Excellent hemostasis was observed and it did extend down to the muscle  A local flap was designed to minimize the amount of skin graft needed  Incisions were carried to the left and inferiorly as well as to the right and superiorly  The right upper emily flap and the left upper emily flap were elevated in a subcutaneous plane and were advanced towards the wound midline for a flap plus defect of 41 cm by 27 cm  The flaps were inset using 0 PDS suture and 2-0 Vicryl suture in deep dermal technique  The superficial skin was closed using 4-0 strata fix suture in running subcuticular technique    Skin glue was applied  The residual defect measured 20 cm x 18 cm and split-thickness skin graft was designed for this  Excellent hemostasis was achieved  Tumescent anesthesia was infiltrated into the right thigh  Mineral oil was applied and split-thickness skin graft was harvested with an air powered dermatome from the right thigh at 12 1 thousands of an inch thickness  This was then meshed 1-1 5 and placed at the wound base  This was stapled in place  Xeroform was then applied mineral oil moistened gauze was was then applied and a towel was used as a bolster to keep stabilizing pressure on the graft  This resulted in excellent stabilization  xer theoform form ABDs and Ace wraps were applied to the right thigh     I was present for the entire procedure    Patient Disposition:  hemodynamically stable    SIGNATURE: Jeffy Richards MD  DATE: 8/22/19  TIME: 0952

## 2019-08-31 ENCOUNTER — APPOINTMENT (EMERGENCY)
Dept: ULTRASOUND IMAGING | Facility: HOSPITAL | Age: 69
End: 2019-08-31
Payer: COMMERCIAL

## 2019-08-31 ENCOUNTER — HOSPITAL ENCOUNTER (EMERGENCY)
Facility: HOSPITAL | Age: 69
Discharge: HOME | End: 2019-08-31
Attending: EMERGENCY MEDICINE | Admitting: EMERGENCY MEDICINE
Payer: COMMERCIAL

## 2019-08-31 VITALS
WEIGHT: 156.97 LBS | OXYGEN SATURATION: 100 % | SYSTOLIC BLOOD PRESSURE: 123 MMHG | BODY MASS INDEX: 27.81 KG/M2 | RESPIRATION RATE: 16 BRPM | HEART RATE: 79 BPM | HEIGHT: 63 IN | TEMPERATURE: 97.7 F | DIASTOLIC BLOOD PRESSURE: 67 MMHG

## 2019-08-31 DIAGNOSIS — R60.9 DEPENDENT EDEMA: Primary | ICD-10-CM

## 2019-08-31 PROCEDURE — 93971 EXTREMITY STUDY: CPT

## 2019-08-31 PROCEDURE — 99283 EMERGENCY DEPT VISIT LOW MDM: CPT

## 2019-08-31 PROCEDURE — 99284 EMERGENCY DEPT VISIT MOD MDM: CPT | Performed by: EMERGENCY MEDICINE

## 2019-08-31 NOTE — ED PROVIDER NOTES
History  Chief Complaint   Patient presents with    Ankle Swelling     pt has right ankle swelling for the past few days, had a recent surgery and her doc wants her to be seen to rule out clot     HPI patient is a 79-year-old female, she recently had a large area of a basal squamous carcinoma removed from her back  Patient apparently had a graft removed from her right thigh  Patient reports now she noticed some swelling over the right ankle and was concerned and apparently called her doctor  Apparently her doctor wanted to rule out a deep vein thrombosis  Patient was referred to the emergency department for imaging to rule out a DVT  Patient denies any real pain in the area  She reports since her surgery the ankle eyes seems somewhat swollen  She reports both her ankles look a little puffy but the right ankle seems more involved  The right-sided the side of the graft removal   Past medical history of recent cancer excision from her back with a large graft taken from her right thigh  , seizure all allergies  Family history noncontributory  Social history nonsmoker no history of drug abuse  Prior to Admission Medications   Prescriptions Last Dose Informant Patient Reported? Taking? Ascorbic Acid (VITAMIN C) 100 MG tablet   Yes No   Sig: Take 100 mg by mouth daily   Multiple Vitamins-Minerals (MULTIVITAMIN WITH MINERALS) tablet   Yes No   Sig: Take 1 tablet by mouth daily   acetaminophen (TYLENOL) 500 mg tablet   Yes No   Sig: Take 1,000 mg by mouth every 6 (six) hours as needed for mild pain   b complex vitamins tablet   Yes No   Sig: Take 1 tablet by mouth daily   cholecalciferol (VITAMIN D3) 400 units tablet   Yes No   Sig: Take 400 Units by mouth daily   fluocinonide (LIDEX) 0 05 % cream   No No   Sig: Apply topically 2 (two) times a day To the small rash on your arms and back  DO NOT apply to the large wound on your back     glucosamine-chondroitin 500-400 MG tablet   Yes No   Sig: Take 1 tablet by mouth 2 (two) times a day    oxyCODONE-acetaminophen (PERCOCET) 5-325 mg per tablet 2019 at Unknown time  No Yes   Sig: Take 1 tablet by mouth every 4 (four) hours as needed for moderate pain for up to 10 daysMax Daily Amount: 6 tablets   traMADol (ULTRAM) 50 mg tablet   No No   Sig: Take 1 tablet (50 mg total) by mouth every 6 (six) hours as needed for moderate pain   vitamin E 100 UNIT capsule   Yes No   Sig: Take 100 Units by mouth daily      Facility-Administered Medications: None       Past Medical History:   Diagnosis Date    Seasonal allergies        Past Surgical History:   Procedure Laterality Date     SECTION      COMPLEX WOUND CLOSURE TO EXTREMITY Bilateral 2019    Procedure: COMPLEX WOUND CLOSURE, RIGHT BACK;  Surgeon: Ab Che MD;  [facility];  Service: Plastics    FULL THICKNESS SKIN GRAFT N/A 2019    Procedure: SKIN GRAFT FULL THICKNESS  (FTSG) EXTREMITY;  Surgeon: Ab Che MD;  [facility];  Service: Plastics    MASS EXCISION N/A 2019    Procedure: EXCISION  BIOPSY LESION/MASS BACK;  Surgeon: Maria Esther Bolden MD;  [facility];  Service: General    SKIN LESION EXCISION Bilateral 2019    Procedure: EXCISION WIDE LESION RIGHT BACK;  Surgeon: Albina Frias MD;  [facility];  Service: Surgical Oncology       Family History   Problem Relation Age of Onset    Heart disease Mother     Bone cancer Father     Cancer Brother         neuroblastoma     I have reviewed and agree with the history as documented  Social History     Tobacco Use    Smoking status: Former Smoker     Packs/day: 0 00     Types: Cigarettes    Smokeless tobacco: Never Used    Tobacco comment: no cig use as of 21 , using rare CBD oil vapor   Substance Use Topics    Alcohol use: Not Currently    Drug use: Not Currently        Review of Systems   Constitutional: Negative for diaphoresis, fatigue and fever     HENT: Negative for congestion, ear pain, nosebleeds and sore throat  Eyes: Negative for photophobia, pain, discharge and visual disturbance  Respiratory: Negative for cough, choking, chest tightness, shortness of breath and wheezing  Cardiovascular: Positive for leg swelling  Negative for chest pain and palpitations  Gastrointestinal: Negative for abdominal distention, abdominal pain, diarrhea and vomiting  Genitourinary: Negative for dysuria, flank pain and frequency  Musculoskeletal: Negative for back pain, gait problem and joint swelling  Skin: Positive for wound  Negative for color change and rash  Neurological: Negative for dizziness, syncope and headaches  Psychiatric/Behavioral: Negative for behavioral problems and confusion  The patient is not nervous/anxious  All other systems reviewed and are negative  Physical Exam  Physical Exam   Constitutional: She is oriented to person, place, and time  She appears well-developed and well-nourished  HENT:   Head: Normocephalic  Right Ear: External ear normal    Left Ear: External ear normal    Nose: Nose normal    Mouth/Throat: Oropharynx is clear and moist    Eyes: Pupils are equal, round, and reactive to light  EOM and lids are normal    Neck: Normal range of motion  Neck supple  Pulmonary/Chest: Effort normal  No respiratory distress  Patient has a large area of excision on her back, no obvious bleeding or weeping   Musculoskeletal: Normal range of motion  She exhibits no deformity  There is some very mild swelling around the patient's right ankle, there is no pitting, good distal pulses and sensation neurovascular tendon intact, minimal swelling of the right lower leg  Patient has a large graft donor site on her right lower leg it is not red or warm  Neurological: She is alert and oriented to person, place, and time  Skin: Skin is warm and dry  Psychiatric: She has a normal mood and affect  Nursing note and vitals reviewed        Vital Signs  ED Triage Vitals [08/31/19 1347]   Temperature Pulse Respirations Blood Pressure SpO2   97 7 °F (36 5 °C) 90 18 156/74 97 %      Temp Source Heart Rate Source Patient Position - Orthostatic VS BP Location FiO2 (%)   Oral Monitor Sitting Left arm --      Pain Score       4           Vitals:    08/31/19 1347 08/31/19 1442   BP: 156/74 123/67   Pulse: 90 79   Patient Position - Orthostatic VS: Sitting Sitting         Visual Acuity      ED Medications  Medications - No data to display    Diagnostic Studies  Results Reviewed     None                 VAS lower limb venous duplex study, unilateral/limited   Final Result by Delfina Gambino MD (08/31 1814)                 Procedures  Procedures       ED Course          ultrasound showed no DVT  MDM medical decision making 73-year-old female presents emergency department with some edema of her right lower leg status post skin graft site from her right thigh and graft placed on her upper back  Patient was concerned and called her doctor  Patient was referred to the emergency department to rule out DVT  Ultrasound showed no DVT  I believe this is dependent edema from her graft site  Discussed outpatient treatment and follow-up  Disposition  Final diagnoses:   Dependent edema     Time reflects when diagnosis was documented in both MDM as applicable and the Disposition within this note     Time User Action Codes Description Comment    8/31/2019  3:46 PM Oswaldo Box Add [R60 9] Dependent edema       ED Disposition     ED Disposition Condition Date/Time Comment    Discharge Stable Sat Aug 31, 2019  3:46 PM Oscar discharge to home/self care              Follow-up Information    None         Discharge Medication List as of 8/31/2019  3:47 PM      CONTINUE these medications which have NOT CHANGED    Details   oxyCODONE-acetaminophen (PERCOCET) 5-325 mg per tablet Take 1 tablet by mouth every 4 (four) hours as needed for moderate pain for up to 10 daysMax Daily Amount: 6 tablets, Starting Fri 8/23/2019, Until Mon 9/2/2019, Print      acetaminophen (TYLENOL) 500 mg tablet Take 1,000 mg by mouth every 6 (six) hours as needed for mild pain, Historical Med      Ascorbic Acid (VITAMIN C) 100 MG tablet Take 100 mg by mouth daily, Historical Med      b complex vitamins tablet Take 1 tablet by mouth daily, Historical Med      cholecalciferol (VITAMIN D3) 400 units tablet Take 400 Units by mouth daily, Historical Med      fluocinonide (LIDEX) 0 05 % cream Apply topically 2 (two) times a day To the small rash on your arms and back  DO NOT apply to the large wound on your back , Starting Thu 7/18/2019, Print      glucosamine-chondroitin 500-400 MG tablet Take 1 tablet by mouth 2 (two) times a day , Historical Med      Multiple Vitamins-Minerals (MULTIVITAMIN WITH MINERALS) tablet Take 1 tablet by mouth daily, Historical Med      traMADol (ULTRAM) 50 mg tablet Take 1 tablet (50 mg total) by mouth every 6 (six) hours as needed for moderate pain, Starting Fri 7/26/2019, Normal      vitamin E 100 UNIT capsule Take 100 Units by mouth daily, Historical Med           No discharge procedures on file      ED Provider  Electronically Signed by           Yolanda Victoria MD  08/31/19 8694

## 2019-09-04 ENCOUNTER — TELEPHONE (OUTPATIENT)
Dept: SURGICAL ONCOLOGY | Facility: HOSPITAL | Age: 69
End: 2019-09-04

## 2019-09-04 NOTE — TELEPHONE ENCOUNTER
Pt called to notify me that she is starting to take ibuprofen for her pain since the oxycodone that Dr Alena Jones prescribed is not helping  Also notified me she is taking benadryl for irritation from the tape  Pt is also having trouble sleeping  Advised pt to call her PCP to address the sleeping issue but from a surgical standpoint she is doing well

## 2019-09-11 ENCOUNTER — TELEPHONE (OUTPATIENT)
Dept: HEMATOLOGY ONCOLOGY | Facility: CLINIC | Age: 69
End: 2019-09-11

## 2019-09-11 ENCOUNTER — OFFICE VISIT (OUTPATIENT)
Dept: SURGICAL ONCOLOGY | Facility: CLINIC | Age: 69
End: 2019-09-11

## 2019-09-11 VITALS
WEIGHT: 154 LBS | TEMPERATURE: 98.2 F | DIASTOLIC BLOOD PRESSURE: 70 MMHG | SYSTOLIC BLOOD PRESSURE: 122 MMHG | BODY MASS INDEX: 27.29 KG/M2 | HEIGHT: 63 IN | HEART RATE: 81 BPM | RESPIRATION RATE: 16 BRPM

## 2019-09-11 DIAGNOSIS — C44.99 BASOSQUAMOUS CARCINOMA OF SKIN: Primary | ICD-10-CM

## 2019-09-11 PROCEDURE — 99024 POSTOP FOLLOW-UP VISIT: CPT | Performed by: SURGERY

## 2019-09-11 NOTE — PROGRESS NOTES
Surgical Oncology Follow Up       [address] [place] [address]  [facility] [place] [address] [address] [place] [address]  1950  [id number]  [address] [place] [address]  [facility] [place] [address] [address] [place] [address]    Chief Complaint   Patient presents with    Post-op     Post-op wide excision basal cell carcinoma of back  Assessment/Plan:    No problem-specific Assessment & Plan notes found for this encounter  Diagnoses and all orders for this visit:    Basosquamous carcinoma of skin  -     Ambulatory referral to Dermatology; Future        Advance Care Planning/Advance Directives:  Discussed disease status, cancer treatment plans and/or cancer treatment goals with the patient  Basosquamous carcinoma of skin    7/17/2019 Biopsy     Skin Back, excisional biopsy of back mass:  - Ulcerated basosquamous carcinoma, extending into deep reticular dermis and superficial     subcutis (6 4 mm deep, Donell's level IV-V)  - No perineural or lymphovascular invasion identified   - Peripheral and deep peripheral resection margins involved by invasive carcinoma  8/22/2019 Surgery     Excision of back basosquamous carcinoma (Dr Glen Dixon and Dr Chandrakant Noland):   - Basal cell carcinoma with invasion into underlying skeletal muscle  - No tumor is seen at margins of excision     Right back deep margin tissue:  - Primarily fatty tissue  - No malignancy seen         History of Present Illness: The patient is a 72-year-old woman here for postop check status post excision and grafting of a very large basal cell cancer her back   -Interval History:  No issues or complaints since procedure was performed  She follows with Dr Chandrakant Noland with rest of the skin graft  Review of Systems:  Review of Systems   Constitutional: Negative  HENT: Negative  Eyes: Negative  Respiratory: Negative  Cardiovascular: Negative  Gastrointestinal: Negative  Endocrine: Negative  Genitourinary: Negative  Musculoskeletal: Negative  Skin: Positive for wound  Allergic/Immunologic: Negative  Neurological: Negative  Hematological: Negative  Psychiatric/Behavioral: Negative          Patient Active Problem List   Diagnosis    Wound, open, back    Open wound of right side of back    Solar dermatitis    Gallbladder dilatation    Constipation    Basosquamous carcinoma of skin     Past Medical History:   Diagnosis Date    Seasonal allergies      Past Surgical History:   Procedure Laterality Date     SECTION      COMPLEX WOUND CLOSURE TO EXTREMITY Bilateral 2019    Procedure: COMPLEX WOUND CLOSURE, RIGHT BACK;  Surgeon: Angela Conrad MD;  [facility];  Service: Plastics    FULL THICKNESS SKIN GRAFT N/A 2019    Procedure: SKIN GRAFT FULL THICKNESS  (FTSG) EXTREMITY;  Surgeon: Angela Conrad MD;  [facility];  Service: Plastics    MASS EXCISION N/A 2019    Procedure: EXCISION  BIOPSY LESION/MASS BACK;  Surgeon: Carroll Cockayne, MD;  [facility];  Service: General    SKIN LESION EXCISION Bilateral 2019    Procedure: EXCISION WIDE LESION RIGHT BACK;  Surgeon: Freda Williamson MD;  [facility];  Service: Surgical Oncology     Family History   Problem Relation Age of Onset    Heart disease Mother     Bone cancer Father     Cancer Brother         neuroblastoma     Social History     Socioeconomic History    Marital status: /Civil Union     Spouse name: Not on file    Number of children: Not on file    Years of education: Not on file    Highest education level: Not on file   Occupational History    Not on file   Social Needs    Financial resource strain: Not on file    Food insecurity:     Worry: Not on file     Inability: Not on file    Transportation needs:     Medical: Not on file     Non-medical: Not on file   Tobacco Use    Smoking status: Former Smoker     Packs/day: 0 00     Types: Cigarettes    Smokeless tobacco: Never Used    Tobacco comment: no cig use as of 8/12/19 , using rare CBD oil vapor   Substance and Sexual Activity    Alcohol use: Not Currently    Drug use: Not Currently    Sexual activity: Not on file   Lifestyle    Physical activity:     Days per week: Not on file     Minutes per session: Not on file    Stress: Not on file   Relationships    Social connections:     Talks on phone: Not on file     Gets together: Not on file     Attends [employer] service: Not on file     Active member of club or organization: Not on file     Attends meetings of clubs or organizations: Not on file     Relationship status: Not on file    Intimate partner violence:     Fear of current or ex partner: Not on file     Emotionally abused: Not on file     Physically abused: Not on file     Forced sexual activity: Not on file   Other Topics Concern    Not on file   Social History Narrative    Not on file       Current Outpatient Medications:     acetaminophen (TYLENOL) 500 mg tablet, Take 1,000 mg by mouth every 6 (six) hours as needed for mild pain, Disp: , Rfl:     Ascorbic Acid (VITAMIN C) 100 MG tablet, Take 100 mg by mouth daily, Disp: , Rfl:     b complex vitamins tablet, Take 1 tablet by mouth daily, Disp: , Rfl:     cholecalciferol (VITAMIN D3) 400 units tablet, Take 400 Units by mouth daily, Disp: , Rfl:     fluocinonide (LIDEX) 0 05 % cream, Apply topically 2 (two) times a day To the small rash on your arms and back   DO NOT apply to the large wound on your back , Disp: 30 g, Rfl: 0    glucosamine-chondroitin 500-400 MG tablet, Take 1 tablet by mouth 2 (two) times a day , Disp: , Rfl:     Multiple Vitamins-Minerals (MULTIVITAMIN WITH MINERALS) tablet, Take 1 tablet by mouth daily, Disp: , Rfl:     vitamin E 100 UNIT capsule, Take 100 Units by mouth daily, Disp: , Rfl:   No Known Allergies  Vitals:    09/11/19 1506   BP: 122/70   Pulse: 81 Resp: 16   Temp: 98 2 °F (36 8 °C)       Physical Exam   Abdominal: Bowel sounds are normal    Skin:   Well-healed back excision site  Graft appears to have taken, though there is some areas with granulation tissue alone  No evidence of wound breakdown, compromise, or infection  Results:  Labs:    Case Report   Surgical Pathology Report                         Case: [id number]                                    Authorizing Provider: Jacklyn Souza MD        Collected:           08/22/2019 1912               Ordering Location:     [address]      Received:            08/23/2019 0853                                      Hospital Operating Room                                                       Pathologist:           Jennifer Barber MD                                                             Specimens:   A) - Lesion, RIGHT BACK BASAL CELL CANCER                                                            B) - Soft Tissue, Other, RIGHT BACK BASAL CELL CANCER                                      Final Diagnosis   A  Skin of right back:  - Basal cell carcinoma with invasion into underlying skeletal muscle  - No tumor is seen at margins of excision     B  Right back deep margin tissue:  - Primarily fatty tissue  - No malignancy seen     Interpretation performed at [place] [id number]      Electronically signed by Jennifer Barber MD on 8/29/2019 at  2:35 PM   Additional Information        Imaging  Vas Lower Limb Venous Duplex Study, Unilateral/limited    Result Date: 8/31/2019  Narrative:  [facility]: Indications: Swelling of Limb [R22 4]  Swelling of right ankle  No history of DVT  FINDINGS:  Right    Impression       CFV      Normal (Patent)     CONCLUSION: Impression: RIGHT LOWER LIMB No evidence of acute or chronic deep vein thrombosis   No evidence of superficial thrombophlebitis noted   Doppler evaluation shows a normal response to augmentation maneuvers  Popliteal, posterior tibial and anterior tibial arterial Doppler waveforms are triphasic  LEFT LOWER LIMB LIMITED Evaluation shows no evidence of thrombus in the common femoral vein  Doppler evaluation shows a normal response to augmentation maneuvers  Technical findings were given to Dr Keven Sierra at time of scan  SIGNATURE: Electronically Signed by: Cynthia Claros MD on 2019-08-31 06:14:24 PM    I reviewed the above laboratory and imaging data  Discussion/Summary:  History of locally invasive basal cell cancer status post excision and grafting  Margins are clear  Recommend close surveillance  We will therefore plan on 6 month follow-up  Will make referral for dermatology visit to establish lifelong skin surveillance  She will follow up with Plastic surgery team regarding wound care/graft care

## 2019-09-11 NOTE — LETTER
September 11, 2019     Diane Pond MD  [address] [facility] [address] [facility]  [address]    Patient: Rich Alexis   YOB: 1950   Date of Visit: 9/11/2019       Dear Dr Diane Pond:    Thank you for referring Rich Alexis to me for evaluation  Below are my notes for this consultation  If you have questions, please do not hesitate to call me  I look forward to following your patient along with you  Sincerely,        Ling Fernando MD        CC: No Recipients  Ling Fernando MD  9/11/2019  3:42 PM  Sign at close encounter     Surgical Oncology Follow Up       [address] [facility]  [address]  1950  [id number]  [address] [place] [address],[address]  [facility] [place]  [address] [address]    Chief Complaint   Patient presents with    Post-op     Post-op wide excision basal cell carcinoma of back  Assessment/Plan:    No problem-specific Assessment & Plan notes found for this encounter  Diagnoses and all orders for this visit:    Basosquamous carcinoma of skin  -     Ambulatory referral to Dermatology; Future        Advance Care Planning/Advance Directives:  Discussed disease status, cancer treatment plans and/or cancer treatment goals with the patient  Basosquamous carcinoma of skin    7/17/2019 Biopsy     Skin Back, excisional biopsy of back mass:  - Ulcerated basosquamous carcinoma, extending into deep reticular dermis and superficial     subcutis (6 4 mm deep, Donell's level IV-V)  - No perineural or lymphovascular invasion identified   - Peripheral and deep peripheral resection margins involved by invasive carcinoma          8/22/2019 Surgery     Excision of back basosquamous carcinoma (Dr Micaela Hidalgo and Dr David Diaz):   - Basal cell carcinoma with invasion into underlying skeletal muscle  - No tumor is seen at margins of excision     Right back deep margin tissue:  - Primarily fatty tissue  - No malignancy seen         History of Present Illness: The patient is a 28-year-old woman here for postop check status post excision and grafting of a very large basal cell cancer her back   -Interval History:  No issues or complaints since procedure was performed  She follows with Dr Percy Kwong with rest of the skin graft  Review of Systems:  Review of Systems   Constitutional: Negative  HENT: Negative  Eyes: Negative  Respiratory: Negative  Cardiovascular: Negative  Gastrointestinal: Negative  Endocrine: Negative  Genitourinary: Negative  Musculoskeletal: Negative  Skin: Positive for wound  Allergic/Immunologic: Negative  Neurological: Negative  Hematological: Negative  Psychiatric/Behavioral: Negative          Patient Active Problem List   Diagnosis    Wound, open, back    Open wound of right side of back    Solar dermatitis    Gallbladder dilatation    Constipation    Basosquamous carcinoma of skin     Past Medical History:   Diagnosis Date    Seasonal allergies      Past Surgical History:   Procedure Laterality Date     SECTION      COMPLEX WOUND CLOSURE TO EXTREMITY Bilateral 2019    Procedure: COMPLEX WOUND CLOSURE, RIGHT BACK;  Surgeon: Lena Flores MD;  [facility];  Service: Plastics    FULL THICKNESS SKIN GRAFT N/A 2019    Procedure: SKIN GRAFT FULL THICKNESS  (FTSG) EXTREMITY;  Surgeon: Lena Flores MD;  [facility];  Service: Plastics    MASS EXCISION N/A 2019    Procedure: EXCISION  BIOPSY LESION/MASS BACK;  Surgeon: Wyline Schilder, MD;  [facility];  Service: General    SKIN LESION EXCISION Bilateral 2019    Procedure: EXCISION WIDE LESION RIGHT BACK;  Surgeon: Zoila Biggs MD;  [facility];  Service: Surgical Oncology     Family History   Problem Relation Age of Onset    Heart disease Mother     Bone cancer Father     Cancer Brother neuroblastoma     Social History     Socioeconomic History    Marital status: /Civil Union     Spouse name: Not on file    Number of children: Not on file    Years of education: Not on file    Highest education level: Not on file   Occupational History    Not on file   Social Needs    Financial resource strain: Not on file    Food insecurity:     Worry: Not on file     Inability: Not on file    Transportation needs:     Medical: Not on file     Non-medical: Not on file   Tobacco Use    Smoking status: Former Smoker     Packs/day: 0 00     Types: Cigarettes    Smokeless tobacco: Never Used    Tobacco comment: no cig use as of 8/12/19 , using rare CBD oil vapor   Substance and Sexual Activity    Alcohol use: Not Currently    Drug use: Not Currently    Sexual activity: Not on file   Lifestyle    Physical activity:     Days per week: Not on file     Minutes per session: Not on file    Stress: Not on file   Relationships    Social connections:     Talks on phone: Not on file     Gets together: Not on file     Attends Islam service: Not on file     Active member of club or organization: Not on file     Attends meetings of clubs or organizations: Not on file     Relationship status: Not on file    Intimate partner violence:     Fear of current or ex partner: Not on file     Emotionally abused: Not on file     Physically abused: Not on file     Forced sexual activity: Not on file   Other Topics Concern    Not on file   Social History Narrative    Not on file       Current Outpatient Medications:     acetaminophen (TYLENOL) 500 mg tablet, Take 1,000 mg by mouth every 6 (six) hours as needed for mild pain, Disp: , Rfl:     Ascorbic Acid (VITAMIN C) 100 MG tablet, Take 100 mg by mouth daily, Disp: , Rfl:     b complex vitamins tablet, Take 1 tablet by mouth daily, Disp: , Rfl:     cholecalciferol (VITAMIN D3) 400 units tablet, Take 400 Units by mouth daily, Disp: , Rfl:     fluocinonide (LIDEX) 0 05 % cream, Apply topically 2 (two) times a day To the small rash on your arms and back  DO NOT apply to the large wound on your back , Disp: 30 g, Rfl: 0    glucosamine-chondroitin 500-400 MG tablet, Take 1 tablet by mouth 2 (two) times a day , Disp: , Rfl:     Multiple Vitamins-Minerals (MULTIVITAMIN WITH MINERALS) tablet, Take 1 tablet by mouth daily, Disp: , Rfl:     vitamin E 100 UNIT capsule, Take 100 Units by mouth daily, Disp: , Rfl:   No Known Allergies  Vitals:    09/11/19 1506   BP: 122/70   Pulse: 81   Resp: 16   Temp: 98 2 °F (36 8 °C)       Physical Exam   Abdominal: Bowel sounds are normal    Skin:   Well-healed back excision site  Graft appears to have taken, though there is some areas with granulation tissue alone  No evidence of wound breakdown, compromise, or infection  Results:  Labs:    Case Report   Surgical Pathology Report                         Case: [id number]                                    Authorizing Provider: Irene Aguirre MD        Collected:           08/22/2019 1912               Ordering Location:     [address]      Received:            08/23/2019 [address] [place] [address] Operating Room                                                       Pathologist:           Patricia Maradiaga MD                                                             Specimens:   A) - Lesion, RIGHT BACK BASAL CELL CANCER                                                            B) - Soft Tissue, Other, RIGHT BACK BASAL CELL CANCER                                      Final Diagnosis   A  Skin of right back:  - Basal cell carcinoma with invasion into underlying skeletal muscle  - No tumor is seen at margins of excision     B   Right back deep margin tissue:  - Primarily fatty tissue  - No malignancy seen     Interpretation performed at [facility], [address]      Electronically signed by Patricia Maradiaga MD on 8/29/2019 at  2:35 PM   Additional Information        Imaging  Vas Lower Limb Venous Duplex Study, Unilateral/limited    Result Date: 8/31/2019  Narrative:  [facility] REPORT CLINICAL: Indications: Swelling of Limb [R22 4]  Swelling of right ankle  No history of DVT  FINDINGS:  Right    Impression       CFV      Normal (Patent)     CONCLUSION: Impression: RIGHT LOWER LIMB No evidence of acute or chronic deep vein thrombosis   No evidence of superficial thrombophlebitis noted  Doppler evaluation shows a normal response to augmentation maneuvers  Popliteal, posterior tibial and anterior tibial arterial Doppler waveforms are triphasic  LEFT LOWER LIMB LIMITED Evaluation shows no evidence of thrombus in the common femoral vein  Doppler evaluation shows a normal response to augmentation maneuvers  Technical findings were given to Dr Mitchell Baldwin at time of scan  SIGNATURE: Electronically Signed by: Mario Forman MD on 2019-08-31 06:14:24 PM    I reviewed the above laboratory and imaging data  Discussion/Summary:  History of locally invasive basal cell cancer status post excision and grafting  Margins are clear  Recommend close surveillance  We will therefore plan on 6 month follow-up  Will make referral for dermatology visit to establish lifelong skin surveillance  She will follow up with Plastic surgery team regarding wound care/graft care

## 2019-10-11 ENCOUNTER — TELEPHONE (OUTPATIENT)
Dept: SURGICAL ONCOLOGY | Facility: CLINIC | Age: 69
End: 2019-10-11

## 2019-12-19 ENCOUNTER — OFFICE VISIT (OUTPATIENT)
Dept: DERMATOLOGY | Facility: CLINIC | Age: 69
End: 2019-12-19
Payer: COMMERCIAL

## 2019-12-19 DIAGNOSIS — Z85.828 HISTORY OF SKIN CANCER: ICD-10-CM

## 2019-12-19 DIAGNOSIS — L82.1 SEBORRHEIC KERATOSIS: ICD-10-CM

## 2019-12-19 DIAGNOSIS — Z13.89 SCREENING FOR SKIN CONDITION: ICD-10-CM

## 2019-12-19 DIAGNOSIS — D22.9 NEVUS: Primary | ICD-10-CM

## 2019-12-19 PROCEDURE — 99213 OFFICE O/P EST LOW 20 MIN: CPT | Performed by: DERMATOLOGY

## 2019-12-19 NOTE — PROGRESS NOTES
[address]  [address] [facility] [address]  [phone]  [phone]     MRN: [id number] : 1950  Encounter: [id number]  Patient Information: Marely Mcclelland  Chief complaint:  Six-month check    History of present illness:  28-year-old female with an extremely large basal squamous cancer on the back which was  biopsied by me originally in the summer and excised by Dr Jessica Crandall and repaired by Plastic surgery Dr Scott Adkins  No problems noted    Past Medical History:   Diagnosis Date    Seasonal allergies      Past Surgical History:   Procedure Laterality Date     SECTION      COMPLEX WOUND CLOSURE TO EXTREMITY Bilateral 2019    Procedure: COMPLEX WOUND CLOSURE, RIGHT BACK;  Surgeon: Godwin Patel MD;  [facility];  Service: Plastics    FULL THICKNESS SKIN GRAFT N/A 2019    Procedure: SKIN GRAFT FULL THICKNESS  (FTSG) EXTREMITY;  Surgeon: Godwin Patel MD;  [facility];  Service: Plastics    MASS EXCISION N/A 2019    Procedure: EXCISION  BIOPSY LESION/MASS BACK;  Surgeon: Tra Wood MD;  [facility];  Service: General    SKIN LESION EXCISION Bilateral 2019    Procedure: EXCISION WIDE LESION RIGHT BACK;  Surgeon: Stevo Ortiz MD;  [facility];  Service: Surgical Oncology     Social History   Social History     Substance and Sexual Activity   Alcohol Use Not Currently     Social History     Substance and Sexual Activity   Drug Use Not Currently     Social History     Tobacco Use   Smoking Status Former Smoker    Packs/day: 0 00    Types: Cigarettes   Smokeless Tobacco Never Used   Tobacco Comment    no cig use as of 21 , using rare CBD oil vapor     Family History   Problem Relation Age of Onset    Heart disease Mother     Bone cancer Father     Cancer Brother         neuroblastoma     Meds/Allergies   No Known Allergies    Meds:  Prior to Admission medications Medication Sig Start Date End Date Taking? Authorizing Provider   acetaminophen (TYLENOL) 500 mg tablet Take 1,000 mg by mouth every 6 (six) hours as needed for mild pain   Yes Historical Provider, MD   Ascorbic Acid (VITAMIN C) 100 MG tablet Take 100 mg by mouth daily   Yes Historical Provider, MD   b complex vitamins tablet Take 1 tablet by mouth daily   Yes Historical Provider, MD   cholecalciferol (VITAMIN D3) 400 units tablet Take 400 Units by mouth daily   Yes Historical Provider, MD   fluocinonide (LIDEX) 0 05 % cream Apply topically 2 (two) times a day To the small rash on your arms and back  DO NOT apply to the large wound on your back   7/18/19  Yes Heide Abad MD   glucosamine-chondroitin 500-400 MG tablet Take 1 tablet by mouth 2 (two) times a day    Yes Historical Provider, MD   Multiple Vitamins-Minerals (MULTIVITAMIN WITH MINERALS) tablet Take 1 tablet by mouth daily   Yes Historical Provider, MD   vitamin E 100 UNIT capsule Take 100 Units by mouth daily   Yes Historical Provider, MD   diclofenac sodium (VOLTAREN) 50 mg EC tablet Take 50 mg by mouth 3 (three) times a day as needed 12/16/19   Historical Provider, MD       Subjective:     Review of Systems:    General: negative for - chills, fatigue, fever,  weight gain or weight loss  Psychological: negative for - anxiety, behavioral disorder, concentration difficulties, decreased libido, depression, irritability, memory difficulties, mood swings, sleep disturbances or suicidal ideation  ENT: negative for - hearing difficulties , nasal congestion, nasal discharge, oral lesions, sinus pain, sneezing, sore throat  Allergy and Immunology: negative for - hives, insect bite sensitivity,  Hematological and Lymphatic: negative for - bleeding problems, blood clots,bruising, swollen lymph nodes  Endocrine: negative for - hair pattern changes, hot flashes, malaise/lethargy, mood swings, palpitations, polydipsia/polyuria, skin changes, temperature intolerance or unexpected weight change  Respiratory: negative for - cough, hemoptysis, orthopnea, shortness of breath, or wheezing  Cardiovascular: negative for - chest pain, dyspnea on exertion, edema,  Gastrointestinal: negative for - abdominal pain, nausea/vomiting  Genito-Urinary: negative for - dysuria, incontinence, irregular/heavy menses or urinary frequency/urgency  Musculoskeletal: negative for - gait disturbance, joint pain, joint stiffness, joint swelling, muscle pain, muscular weakness  Dermatological:  As in HPI  Neurological: negative for confusion, dizziness, headaches, impaired coordination/balance, memory loss, numbness/tingling, seizures, speech problems, tremors or weakness       Objective: There were no vitals taken for this visit  Physical Exam:    General Appearance:    Alert, cooperative, no distress   Head:    Normocephalic, without obvious abnormality, atraumatic   Lymphatics:    No lymphadenopathy noted      Abdomen:   No hepatosplenomegaly   Skin:   A full skin exam was performed including scalp, head scalp, eyes, ears, nose, lips, neck, chest, axilla, abdomen, back, buttocks, bilateral upper extremities, bilateral lower extremities, hands, feet, fingers, toes, fingernails, and toenails previous site of excision well-healed but also large graft site healing well-healed no signs of any other atypia normal pigmented lesion regular shape color normal keratotic papules greasy stuck on appearance nothing else remarkable noted on exam     Assessment:     1  Nevus     2  Seborrheic keratosis     3  Screening for skin condition     4   History of skin cancer           Plan:   Nevi reviewed the concept of ABCDE and ugly duckling nothing markedly atypical patient reassured  Seborrheic keratosis patient reassured these are normal growths we acquire with age no treatment needed  History of skin cancer in no recurrence nothing else atypical sunblock recommended follow-up in 6 months  Screening for dermatologic disorders nothing else of concern noted on complete exam follow-up in 6 months    Madyson Golden MD  12/19/2019,3:24 PM    Portions of the record may have been created with voice recognition software   Occasional wrong word or "sound a like" substitutions may have occurred due to the inherent limitations of voice recognition software   Read the chart carefully and recognize, using context, where substitutions have occurred

## 2019-12-19 NOTE — PATIENT INSTRUCTIONS
Nevi reviewed the concept of ABCDE and ugly duckling nothing markedly atypical patient reassured  Seborrheic keratosis patient reassured these are normal growths we acquire with age no treatment needed  History of skin cancer in no recurrence nothing else atypical sunblock recommended follow-up in 6 months  Screening for dermatologic disorders nothing else of concern noted on complete exam follow-up in 6 months

## 2020-01-22 ENCOUNTER — TELEPHONE (OUTPATIENT)
Dept: DERMATOLOGY | Facility: CLINIC | Age: 70
End: 2020-01-22

## 2020-01-22 NOTE — TELEPHONE ENCOUNTER
Called patient offering an appointment with Dr Waqar Ruelas  Advised to call us back when available

## 2020-03-06 PROBLEM — C44.99 BASOSQUAMOUS CARCINOMA OF SKIN: Status: RESOLVED | Noted: 2019-07-26 | Resolved: 2020-03-06

## 2020-03-11 ENCOUNTER — OFFICE VISIT (OUTPATIENT)
Dept: SURGICAL ONCOLOGY | Facility: CLINIC | Age: 70
End: 2020-03-11
Payer: COMMERCIAL

## 2020-03-11 VITALS
DIASTOLIC BLOOD PRESSURE: 84 MMHG | SYSTOLIC BLOOD PRESSURE: 150 MMHG | TEMPERATURE: 98.1 F | BODY MASS INDEX: 30.12 KG/M2 | HEIGHT: 63 IN | HEART RATE: 93 BPM | RESPIRATION RATE: 16 BRPM | WEIGHT: 170 LBS

## 2020-03-11 DIAGNOSIS — C44.99 BASOSQUAMOUS CARCINOMA OF SKIN: Primary | ICD-10-CM

## 2020-03-11 PROCEDURE — 99213 OFFICE O/P EST LOW 20 MIN: CPT | Performed by: SURGERY

## 2020-03-11 NOTE — PROGRESS NOTES
Surgical Oncology Follow Up       [address] [place] [address]  [facility] [place]  [address]  1950  [phone]  [address]  [facility] [place]  [address] [facility] [address]    Chief Complaint   Patient presents with    Follow-up     6 month follow up  Assessment/Plan:    No problem-specific Assessment & Plan notes found for this encounter  Diagnoses and all orders for this visit:    Basosquamous carcinoma of skin        Advance Care Planning/Advance Directives:  Discussed disease status, cancer treatment plans and/or cancer treatment goals with the patient  Basosquamous carcinoma of skin (Resolved)    7/17/2019 Biopsy     Skin Back, excisional biopsy of back mass:  - Ulcerated basosquamous carcinoma, extending into deep reticular dermis and superficial     subcutis (6 4 mm deep, Donell's level IV-V)  - No perineural or lymphovascular invasion identified   - Peripheral and deep peripheral resection margins involved by invasive carcinoma  8/22/2019 Surgery     Excision of back basosquamous carcinoma (Dr Amarilis Gar and Dr Martha Plascencia):   - Basal cell carcinoma with invasion into underlying skeletal muscle  - No tumor is seen at margins of excision     Right back deep margin tissue:  - Primarily fatty tissue  - No malignancy seen         History of Present Illness:  Patient is a 19-year-old woman with a history of basal squamous cell of the back status post excision and grafting last summer     -Interval History:She is here with no new skin complaints to report  Review of Systems:  Review of Systems   Constitutional: Negative  HENT: Negative  Eyes: Negative  Respiratory: Negative  Cardiovascular: Negative  Gastrointestinal: Negative  Endocrine: Negative  Genitourinary: Negative  Musculoskeletal: Negative  Skin: Negative  Allergic/Immunologic: Negative  Neurological: Negative  Hematological: Negative  Psychiatric/Behavioral: Negative          Patient Active Problem List   Diagnosis    Wound, open, back    Open wound of right side of back    Solar dermatitis    Gallbladder dilatation    Constipation     Past Medical History:   Diagnosis Date    Seasonal allergies      Past Surgical History:   Procedure Laterality Date     SECTION      COMPLEX WOUND CLOSURE TO EXTREMITY Bilateral 2019    Procedure: COMPLEX WOUND CLOSURE, RIGHT BACK;  Surgeon: Abby Gonzalez MD;  [facility];  Service: Plastics    FULL THICKNESS SKIN GRAFT N/A 2019    Procedure: SKIN GRAFT FULL THICKNESS  (FTSG) EXTREMITY;  Surgeon: Abby Gonzalez MD;  [facility];  Service: Plastics    MASS EXCISION N/A 2019    Procedure: EXCISION  BIOPSY LESION/MASS BACK;  Surgeon: Sujatha Morales MD;  [facility];  Service: General    SKIN LESION EXCISION Bilateral 2019    Procedure: EXCISION WIDE LESION RIGHT BACK;  Surgeon: Benito Powers MD;  [facility];  Service: Surgical Oncology     Family History   Problem Relation Age of Onset    Heart disease Mother     Bone cancer Father     Cancer Brother         neuroblastoma     Social History     Socioeconomic History    Marital status: /Civil Union     Spouse name: Not on file    Number of children: Not on file    Years of education: Not on file    Highest education level: Not on file   Occupational History    Not on file   Social Needs    Financial resource strain: Not on file    Food insecurity:     Worry: Not on file     Inability: Not on file    Transportation needs:     Medical: Not on file     Non-medical: Not on file   Tobacco Use    Smoking status: Former Smoker     Packs/day: 0 00     Types: Cigarettes    Smokeless tobacco: Never Used    Tobacco comment: no cig use as of 21 , using rare CBD oil vapor   Substance and Sexual Activity    Alcohol use: Not Currently    Drug use: Not Currently    Sexual activity: Not on file   Lifestyle    Physical activity:     Days per week: Not on file     Minutes per session: Not on file    Stress: Not on file   Relationships    Social connections:     Talks on phone: Not on file     Gets together: Not on file     Attends Hoahaoism service: Not on file     Active member of club or organization: Not on file     Attends meetings of clubs or organizations: Not on file     Relationship status: Not on file    Intimate partner violence:     Fear of current or ex partner: Not on file     Emotionally abused: Not on file     Physically abused: Not on file     Forced sexual activity: Not on file   Other Topics Concern    Not on file   Social History Narrative    Not on file       Current Outpatient Medications:     acetaminophen (TYLENOL) 500 mg tablet, Take 1,000 mg by mouth every 6 (six) hours as needed for mild pain, Disp: , Rfl:     Ascorbic Acid (VITAMIN C) 100 MG tablet, Take 100 mg by mouth daily, Disp: , Rfl:     b complex vitamins tablet, Take 1 tablet by mouth daily, Disp: , Rfl:     cholecalciferol (VITAMIN D3) 400 units tablet, Take 400 Units by mouth daily, Disp: , Rfl:     diclofenac sodium (VOLTAREN) 50 mg EC tablet, Take 50 mg by mouth 3 (three) times a day as needed, Disp: , Rfl: 0    fluocinonide (LIDEX) 0 05 % cream, Apply topically 2 (two) times a day To the small rash on your arms and back   DO NOT apply to the large wound on your back , Disp: 30 g, Rfl: 0    glucosamine-chondroitin 500-400 MG tablet, Take 1 tablet by mouth 2 (two) times a day , Disp: , Rfl:     Multiple Vitamins-Minerals (MULTIVITAMIN WITH MINERALS) tablet, Take 1 tablet by mouth daily, Disp: , Rfl:     vitamin E 100 UNIT capsule, Take 100 Units by mouth daily, Disp: , Rfl:   No Known Allergies  Vitals:    03/11/20 1249   BP: 150/84   Pulse: 93   Resp: 16   Temp: 98 1 °F (36 7 °C)       Physical Exam Constitutional: She is oriented to person, place, and time  She appears well-developed and well-nourished  HENT:   Head: Normocephalic and atraumatic  Right Ear: External ear normal    Left Ear: External ear normal    Eyes: Pupils are equal, round, and reactive to light  EOM are normal    Neck: Normal range of motion  Neck supple  Cardiovascular: Normal rate, regular rhythm and normal heart sounds  Pulmonary/Chest: Effort normal and breath sounds normal    Abdominal: Soft  Bowel sounds are normal    Lymphadenopathy:     She has no cervical adenopathy  Neurological: She is alert and oriented to person, place, and time  Skin: Skin is warm and dry  Back excision graft site looks good  No evidence of recurrence visible or palpable  Negative for axillary adenopathy  Psychiatric: She has a normal mood and affect  Her behavior is normal  Judgment and thought content normal          Results:  Labs:  none    Imaging  No results found  I reviewed the above laboratory and imaging data  Discussion/Summary:  Basal squamous cell cancer of back, post excision and grafting  No evidence of recurrence  Follow-up in 6 months for surveillance per guidelines

## 2020-03-23 ENCOUNTER — TELEPHONE (OUTPATIENT)
Dept: DERMATOLOGY | Facility: CLINIC | Age: 70
End: 2020-03-23

## 2020-05-23 ENCOUNTER — HOSPITAL ENCOUNTER (EMERGENCY)
Facility: HOSPITAL | Age: 70
Discharge: HOME | End: 2020-05-23
Attending: EMERGENCY MEDICINE | Admitting: EMERGENCY MEDICINE
Payer: COMMERCIAL

## 2020-05-23 ENCOUNTER — APPOINTMENT (EMERGENCY)
Dept: ULTRASOUND IMAGING | Facility: HOSPITAL | Age: 70
End: 2020-05-23
Payer: COMMERCIAL

## 2020-05-23 ENCOUNTER — APPOINTMENT (EMERGENCY)
Dept: CT IMAGING | Facility: HOSPITAL | Age: 70
End: 2020-05-23
Payer: COMMERCIAL

## 2020-05-23 VITALS
WEIGHT: 174.82 LBS | TEMPERATURE: 98.5 F | SYSTOLIC BLOOD PRESSURE: 136 MMHG | BODY MASS INDEX: 30.97 KG/M2 | RESPIRATION RATE: 18 BRPM | DIASTOLIC BLOOD PRESSURE: 72 MMHG | HEART RATE: 84 BPM | OXYGEN SATURATION: 96 %

## 2020-05-23 DIAGNOSIS — M66.0 RUPTURED BAKERS CYST: Primary | ICD-10-CM

## 2020-05-23 DIAGNOSIS — M79.89 RIGHT LEG SWELLING: ICD-10-CM

## 2020-05-23 LAB
ALBUMIN SERPL BCP-MCNC: 3.9 G/DL (ref 3.5–5)
ALP SERPL-CCNC: 86 U/L (ref 46–116)
ALT SERPL W P-5'-P-CCNC: 27 U/L (ref 12–78)
ANION GAP SERPL CALCULATED.3IONS-SCNC: 8 MMOL/L (ref 4–13)
AST SERPL W P-5'-P-CCNC: 17 U/L (ref 5–45)
ATRIAL RATE: 79 BPM
BASOPHILS # BLD AUTO: 0.04 THOUSANDS/ΜL (ref 0–0.1)
BASOPHILS NFR BLD AUTO: 1 % (ref 0–1)
BILIRUB DIRECT SERPL-MCNC: 0.08 MG/DL (ref 0–0.2)
BILIRUB SERPL-MCNC: 0.4 MG/DL (ref 0.2–1)
BUN SERPL-MCNC: 11 MG/DL (ref 5–25)
CALCIUM SERPL-MCNC: 9.4 MG/DL (ref 8.3–10.1)
CHLORIDE SERPL-SCNC: 101 MMOL/L (ref 100–108)
CO2 SERPL-SCNC: 29 MMOL/L (ref 21–32)
CREAT SERPL-MCNC: 0.92 MG/DL (ref 0.6–1.3)
D DIMER PPP FEU-MCNC: 1.04 UG/ML FEU
EOSINOPHIL # BLD AUTO: 0.16 THOUSAND/ΜL (ref 0–0.61)
EOSINOPHIL NFR BLD AUTO: 3 % (ref 0–6)
ERYTHROCYTE [DISTWIDTH] IN BLOOD BY AUTOMATED COUNT: 13.1 % (ref 11.6–15.1)
GFR SERPL CREATININE-BSD FRML MDRD: 65 ML/MIN/1.73SQ M
GLUCOSE SERPL-MCNC: 88 MG/DL (ref 65–140)
HCT VFR BLD AUTO: 38.2 % (ref 34.8–46.1)
HGB BLD-MCNC: 12.2 G/DL (ref 11.5–15.4)
IMM GRANULOCYTES # BLD AUTO: 0.02 THOUSAND/UL (ref 0–0.2)
IMM GRANULOCYTES NFR BLD AUTO: 0 % (ref 0–2)
LYMPHOCYTES # BLD AUTO: 0.98 THOUSANDS/ΜL (ref 0.6–4.47)
LYMPHOCYTES NFR BLD AUTO: 16 % (ref 14–44)
MCH RBC QN AUTO: 28 PG (ref 26.8–34.3)
MCHC RBC AUTO-ENTMCNC: 31.9 G/DL (ref 31.4–37.4)
MCV RBC AUTO: 88 FL (ref 82–98)
MONOCYTES # BLD AUTO: 0.4 THOUSAND/ΜL (ref 0.17–1.22)
MONOCYTES NFR BLD AUTO: 6 % (ref 4–12)
NEUTROPHILS # BLD AUTO: 4.69 THOUSANDS/ΜL (ref 1.85–7.62)
NEUTS SEG NFR BLD AUTO: 74 % (ref 43–75)
NRBC BLD AUTO-RTO: 0 /100 WBCS
NT-PROBNP SERPL-MCNC: 223 PG/ML
P AXIS: 49 DEGREES
PLATELET # BLD AUTO: 359 THOUSANDS/UL (ref 149–390)
PMV BLD AUTO: 8.8 FL (ref 8.9–12.7)
POTASSIUM SERPL-SCNC: 4.7 MMOL/L (ref 3.5–5.3)
PR INTERVAL: 148 MS
PROT SERPL-MCNC: 7.5 G/DL (ref 6.4–8.2)
QRS AXIS: 71 DEGREES
QRSD INTERVAL: 92 MS
QT INTERVAL: 380 MS
QTC INTERVAL: 435 MS
RBC # BLD AUTO: 4.36 MILLION/UL (ref 3.81–5.12)
SODIUM SERPL-SCNC: 138 MMOL/L (ref 136–145)
T WAVE AXIS: 19 DEGREES
TSH SERPL DL<=0.05 MIU/L-ACNC: 1.51 UIU/ML (ref 0.36–3.74)
VENTRICULAR RATE: 79 BPM
WBC # BLD AUTO: 6.29 THOUSAND/UL (ref 4.31–10.16)

## 2020-05-23 PROCEDURE — 85025 COMPLETE CBC W/AUTO DIFF WBC: CPT | Performed by: EMERGENCY MEDICINE

## 2020-05-23 PROCEDURE — 85379 FIBRIN DEGRADATION QUANT: CPT | Performed by: EMERGENCY MEDICINE

## 2020-05-23 PROCEDURE — 93005 ELECTROCARDIOGRAM TRACING: CPT

## 2020-05-23 PROCEDURE — 80076 HEPATIC FUNCTION PANEL: CPT | Performed by: EMERGENCY MEDICINE

## 2020-05-23 PROCEDURE — 93971 EXTREMITY STUDY: CPT | Performed by: SURGERY

## 2020-05-23 PROCEDURE — 80048 BASIC METABOLIC PNL TOTAL CA: CPT | Performed by: EMERGENCY MEDICINE

## 2020-05-23 PROCEDURE — 73701 CT LOWER EXTREMITY W/DYE: CPT

## 2020-05-23 PROCEDURE — 99284 EMERGENCY DEPT VISIT MOD MDM: CPT

## 2020-05-23 PROCEDURE — 93010 ELECTROCARDIOGRAM REPORT: CPT | Performed by: INTERNAL MEDICINE

## 2020-05-23 PROCEDURE — 93971 EXTREMITY STUDY: CPT

## 2020-05-23 PROCEDURE — 83880 ASSAY OF NATRIURETIC PEPTIDE: CPT | Performed by: EMERGENCY MEDICINE

## 2020-05-23 PROCEDURE — 84443 ASSAY THYROID STIM HORMONE: CPT | Performed by: EMERGENCY MEDICINE

## 2020-05-23 PROCEDURE — 99284 EMERGENCY DEPT VISIT MOD MDM: CPT | Performed by: EMERGENCY MEDICINE

## 2020-05-23 PROCEDURE — 36415 COLL VENOUS BLD VENIPUNCTURE: CPT | Performed by: EMERGENCY MEDICINE

## 2020-05-23 RX ADMIN — IOHEXOL 100 ML: 350 INJECTION, SOLUTION INTRAVENOUS at 13:10

## 2020-06-24 ENCOUNTER — OFFICE VISIT (OUTPATIENT)
Dept: DERMATOLOGY | Facility: CLINIC | Age: 70
End: 2020-06-24
Payer: COMMERCIAL

## 2020-06-24 ENCOUNTER — TELEPHONE (OUTPATIENT)
Dept: DERMATOLOGY | Facility: CLINIC | Age: 70
End: 2020-06-24

## 2020-06-24 VITALS — TEMPERATURE: 97.4 F

## 2020-06-24 DIAGNOSIS — Z85.828 HISTORY OF SKIN CANCER: ICD-10-CM

## 2020-06-24 DIAGNOSIS — L98.9 UNKNOWN SKIN LESION: Primary | ICD-10-CM

## 2020-06-24 DIAGNOSIS — Z13.89 SCREENING FOR SKIN CONDITION: ICD-10-CM

## 2020-06-24 DIAGNOSIS — D22.9 NEVUS: ICD-10-CM

## 2020-06-24 DIAGNOSIS — L82.1 SEBORRHEIC KERATOSIS: ICD-10-CM

## 2020-06-24 PROCEDURE — 88305 TISSUE EXAM BY PATHOLOGIST: CPT | Performed by: STUDENT IN AN ORGANIZED HEALTH CARE EDUCATION/TRAINING PROGRAM

## 2020-06-24 PROCEDURE — 11102 TANGNTL BX SKIN SINGLE LES: CPT | Performed by: DERMATOLOGY

## 2020-06-24 PROCEDURE — 99213 OFFICE O/P EST LOW 20 MIN: CPT | Performed by: DERMATOLOGY

## 2020-07-01 ENCOUNTER — TELEPHONE (OUTPATIENT)
Dept: DERMATOLOGY | Facility: CLINIC | Age: 70
End: 2020-07-01

## 2020-07-06 ENCOUNTER — TELEPHONE (OUTPATIENT)
Dept: DERMATOLOGY | Facility: CLINIC | Age: 70
End: 2020-07-06

## 2020-07-06 NOTE — TELEPHONE ENCOUNTER
1  Do you presently have a fever or flu-like symptoms? No  2  Do you have symptoms of an upper respiratory infection like runny nose, sore throat, or cough? No ALLERGIES  3  Are you suffering from new headache that you have not had in the past?  No  4  Do you have/have you experienced any new shortness of breath recently? No  5  Do you have any new diarrhea, nausea or vomiting? No  6  Have you been in contact with anyone who has been sick or diagnosed with COVID-19?  No

## 2020-07-07 ENCOUNTER — PROCEDURE VISIT (OUTPATIENT)
Dept: DERMATOLOGY | Facility: CLINIC | Age: 70
End: 2020-07-07
Payer: COMMERCIAL

## 2020-07-07 VITALS — TEMPERATURE: 98.9 F

## 2020-07-07 DIAGNOSIS — C44.41 BASAL CELL CARCINOMA OF LEFT SIDE OF NECK: Primary | ICD-10-CM

## 2020-07-07 PROCEDURE — 12042 INTMD RPR N-HF/GENIT2.6-7.5: CPT | Performed by: DERMATOLOGY

## 2020-07-07 PROCEDURE — 88305 TISSUE EXAM BY PATHOLOGIST: CPT | Performed by: STUDENT IN AN ORGANIZED HEALTH CARE EDUCATION/TRAINING PROGRAM

## 2020-07-07 PROCEDURE — 11622 EXC S/N/H/F/G MAL+MRG 1.1-2: CPT | Performed by: DERMATOLOGY

## 2020-07-07 NOTE — PROGRESS NOTES
[address]  [address] [address]  [phone]  [phone]     MRN: [id number] : 1950  Encounter: [id number]  Patient Information: Abigail Dow    Subjective:     28-year-old female presents for planned excision previously biopsied basal cell carcinoma left posterior neck     Objective:   Temp 98 9 °F (37 2 °C)     Physical Exam:    General Appearance:    Alert, cooperative, no distress   Skin:   Previous site biopsy noted    Procedure name: Excision with intermediate layered closure        Location:  Left neck    Diagnosis: Basal cell carcinoma    Size of lesion: 5 mm    Margins: 4 mm    Size + Margins: 13 mm    I explained the diagnosis to the patient and we recommend an excision of the lesion for diagnosis and/or treatment  Potential complications include, but are not limited to: scarring, bleeding, infection, incomplete removal, recurrence, and nerve damage  The risks, benefits, and alternatives were discussed with the patient in detail  The location of the tumor was identified, circled, and confirmed by the patient  The correct patient, site, and procedure were confirmed  Anesthesia: 1% xylocaine with 1:100,000 epinephrine 5 cc  The patient was brought into the room, prepped and draped sterilely in the usual manner and anesthesia was administered by local infiltration  A fusiform shape was drawn around the lesion, and the margins were incised to the level of the subcutaneous fat with a number 15cc Bard-Pipo blade  The tissue was removed with sharp and blunt dissection  The lateral margins of the resulting defect were undermined with sharp and blunt dissection  Hemostasis was achieved with electrocautery  The deeper layers of the defect, including subcutaneous fat and fascia, had to be approximated to reduce tension on the suture line  Layered wound closure was performed    The wound was cleaned with saline, dried off, petroleum applied, and the wound was covered with a pressure dressing  The patient was given detailed verbal and written instructions on postoperative care  Suture for adipose/fascial/dermal layer closure: 4-0  vicryl 3 sutures interrupted    Suture used to approximate epidermis: 4-0  nylon 6sutures interrupted    Final wound length: 4 0 cm    Follow-up in: 2 weeks  Patient tolerated procedure well without complications  Assessment:     1  Basal cell carcinoma of left side of neck           Plan:   Wound care instructions given to patient        Prior to Admission medications    Medication Sig Start Date End Date Taking? Authorizing Provider   acetaminophen (TYLENOL) 500 mg tablet Take 1,000 mg by mouth every 6 (six) hours as needed for mild pain   Yes Historical Provider, MD   Ascorbic Acid (VITAMIN C) 100 MG tablet Take 100 mg by mouth daily   Yes Historical Provider, MD   b complex vitamins tablet Take 1 tablet by mouth daily   Yes Historical Provider, MD   cholecalciferol (VITAMIN D3) 400 units tablet Take 400 Units by mouth daily   Yes Historical Provider, MD   diclofenac sodium (VOLTAREN) 50 mg EC tablet Take 50 mg by mouth 3 (three) times a day as needed 12/16/19  Yes Historical Provider, MD   fluocinonide (LIDEX) 0 05 % cream Apply topically 2 (two) times a day To the small rash on your arms and back  DO NOT apply to the large wound on your back   7/18/19  Yes Abby Bacon MD   glucosamine-chondroitin 500-400 MG tablet Take 1 tablet by mouth 2 (two) times a day    Yes Historical Provider, MD   Multiple Vitamins-Minerals (MULTIVITAMIN WITH MINERALS) tablet Take 1 tablet by mouth daily   Yes Historical Provider, MD   vitamin E 100 UNIT capsule Take 100 Units by mouth daily   Yes Historical Provider, MD     No Known Allergies    Eleni Wheeler MD  7/7/2020,2:19 PM    Portions of the record may have been created with voice recognition software   Occasional wrong word or "sound a like" substitutions may have occurred due to the inherent limitations of voice recognition software   Read the chart carefully and recognize, using context, where substitutions have occurred

## 2020-07-13 ENCOUNTER — TELEPHONE (OUTPATIENT)
Dept: DERMATOLOGY | Facility: CLINIC | Age: 70
End: 2020-07-13

## 2020-07-20 ENCOUNTER — TELEPHONE (OUTPATIENT)
Dept: DERMATOLOGY | Facility: CLINIC | Age: 70
End: 2020-07-20

## 2020-07-21 ENCOUNTER — OFFICE VISIT (OUTPATIENT)
Dept: DERMATOLOGY | Facility: CLINIC | Age: 70
End: 2020-07-21

## 2020-07-21 DIAGNOSIS — C44.41 BASAL CELL CARCINOMA OF LEFT SIDE OF NECK: Primary | ICD-10-CM

## 2020-07-21 PROCEDURE — RECHECK: Performed by: DERMATOLOGY

## 2020-07-21 NOTE — PROGRESS NOTES
[facility] DERMATOLOGY  [address] [place] [address]  [phone]  [phone]     MRN: [id number] : 1950  Encounter: [id number]  Patient Information: Gabby Owusu    Subjective:     61-year-old female presents for suture removal from previous excised basal cell carcinoma left posterior neck     Objective: There were no vitals taken for this visit  Physical Exam:    General Appearance:    Alert, cooperative, no distress   Skin:   Previous site of biopsy noted  Procedure:  Suture removal  Site of excision:  Left posterior neck  Wound was cleansed with saline  Wound is intact  Sutures removed  Steri-Strips applied  Biopsy revealed basal cell carcinoma margins     Assessment:     1  Basal cell carcinoma of left side of neck           Plan:   Wound care instructions given to patient        Prior to Admission medications    Medication Sig Start Date End Date Taking? Authorizing Provider   acetaminophen (TYLENOL) 500 mg tablet Take 1,000 mg by mouth every 6 (six) hours as needed for mild pain    Historical Provider, MD   Ascorbic Acid (VITAMIN C) 100 MG tablet Take 100 mg by mouth daily    Historical Provider, MD   b complex vitamins tablet Take 1 tablet by mouth daily    Historical Provider, MD   cholecalciferol (VITAMIN D3) 400 units tablet Take 400 Units by mouth daily    Historical Provider, MD   diclofenac sodium (VOLTAREN) 50 mg EC tablet Take 50 mg by mouth 3 (three) times a day as needed 19   Historical Provider, MD   fluocinonide (LIDEX) 0 05 % cream Apply topically 2 (two) times a day To the small rash on your arms and back  DO NOT apply to the large wound on your back   19   Abby Bacon MD   glucosamine-chondroitin 500-400 MG tablet Take 1 tablet by mouth 2 (two) times a day     Historical Provider, MD   Multiple Vitamins-Minerals (MULTIVITAMIN WITH MINERALS) tablet Take 1 tablet by mouth daily    Historical Provider, MD vitamin E 100 UNIT capsule Take 100 Units by mouth daily    Historical Provider, MD     No Known Allergies    Narendra Naik MD  7/21/2020,1:25 PM    Portions of the record may have been created with voice recognition software   Occasional wrong word or "sound a like" substitutions may have occurred due to the inherent limitations of voice recognition software   Read the chart carefully and recognize, using context, where substitutions have occurred

## 2020-09-04 ENCOUNTER — TELEPHONE (OUTPATIENT)
Dept: SURGICAL ONCOLOGY | Facility: CLINIC | Age: 70
End: 2020-09-04

## 2020-09-09 ENCOUNTER — OFFICE VISIT (OUTPATIENT)
Dept: SURGICAL ONCOLOGY | Facility: CLINIC | Age: 70
End: 2020-09-09
Payer: COMMERCIAL

## 2020-09-09 VITALS
RESPIRATION RATE: 16 BRPM | HEIGHT: 63 IN | SYSTOLIC BLOOD PRESSURE: 150 MMHG | TEMPERATURE: 97.6 F | WEIGHT: 171 LBS | DIASTOLIC BLOOD PRESSURE: 94 MMHG | HEART RATE: 77 BPM | BODY MASS INDEX: 30.3 KG/M2

## 2020-09-09 DIAGNOSIS — C44.99 BASOSQUAMOUS CARCINOMA OF SKIN: Primary | ICD-10-CM

## 2020-09-09 PROCEDURE — 99213 OFFICE O/P EST LOW 20 MIN: CPT | Performed by: SURGERY

## 2020-09-09 NOTE — PROGRESS NOTES
Surgical Oncology Follow Up       [address]  [facility]  [address]   [place] [address]    [facility]  1950  [id number]  [address] [facility] [facility]  [facility]  [address] [address]    Chief Complaint   Patient presents with    Follow-up     6 MONTH FOLLOW UP       Assessment/Plan:    No problem-specific Assessment & Plan notes found for this encounter  Diagnoses and all orders for this visit:    Basosquamous carcinoma of skin        Advance Care Planning/Advance Directives:  Discussed disease status, cancer treatment plans and/or cancer treatment goals with the patient  Oncology History   Basosquamous carcinoma of skin (Resolved)   7/17/2019 Biopsy    Skin Back, excisional biopsy of back mass:  - Ulcerated basosquamous carcinoma, extending into deep reticular dermis and superficial     subcutis (6 4 mm deep, Donell's level IV-V)  - No perineural or lymphovascular invasion identified   - Peripheral and deep peripheral resection margins involved by invasive carcinoma  8/22/2019 Surgery    Excision of back basosquamous carcinoma (Dr Latisha Gavin and Dr Maritza Coyne):   - Basal cell carcinoma with invasion into underlying skeletal muscle  - No tumor is seen at margins of excision     Right back deep margin tissue:  - Primarily fatty tissue  - No malignancy seen         History of Present Illness:  Patient is a 27-year-old woman here for follow-up visit status post excision of basal cell cancer from her back 1 year ago  -Interval History:  No issues or complaints since her last visit  Review of Systems:  Review of Systems   Constitutional: Negative  HENT: Negative  Eyes: Negative  Respiratory: Negative  Cardiovascular: Negative  Gastrointestinal: Negative  Endocrine: Negative  Genitourinary: Negative  Musculoskeletal: Negative  Skin: Negative  Allergic/Immunologic: Negative  Neurological: Negative  Hematological: Negative  Psychiatric/Behavioral: Negative          Patient Active Problem List   Diagnosis    Wound, open, back    Open wound of right side of back    Solar dermatitis    Gallbladder dilatation    Constipation     Past Medical History:   Diagnosis Date    Seasonal allergies      Past Surgical History:   Procedure Laterality Date     SECTION      COMPLEX WOUND CLOSURE TO EXTREMITY Bilateral 2019    Procedure: COMPLEX WOUND CLOSURE, RIGHT BACK;  Surgeon: Carli Grimes MD;  [facility];  Service: Plastics    FULL THICKNESS SKIN GRAFT N/A 2019    Procedure: SKIN GRAFT FULL THICKNESS  (FTSG) EXTREMITY;  Surgeon: Carli Grimes MD;  [facility];  Service: Plastics    MASS EXCISION N/A 2019    Procedure: EXCISION  BIOPSY LESION/MASS BACK;  Surgeon: Hannah Souza MD;  [facility];  Service: General    SKIN LESION EXCISION Bilateral 2019    Procedure: EXCISION WIDE LESION RIGHT BACK;  Surgeon: Dilcia Boyd MD;  [facility];  Service: Surgical Oncology     Family History   Problem Relation Age of Onset    Heart disease Mother     Bone cancer Father     Cancer Brother         neuroblastoma     Social History     Socioeconomic History    Marital status: /Civil Union     Spouse name: Not on file    Number of children: Not on file    Years of education: Not on file    Highest education level: Not on file   Occupational History    Not on file   Social Needs    Financial resource strain: Not on file    Food insecurity     Worry: Not on file     Inability: Not on file   [employer] needs     Medical: Not on file     Non-medical: Not on file   Tobacco Use    Smoking status: Former Smoker     Packs/day: 0 00     Types: Cigarettes    Smokeless tobacco: Never Used    Tobacco comment: no cig use as of 19 , using rare CBD oil vapor   Substance and Sexual Activity    Alcohol use: Not Currently    Drug use: Not Currently    Sexual activity: Not on file   Lifestyle    Physical activity     Days per week: Not on file     Minutes per session: Not on file    Stress: Not on file   Relationships    Social connections     Talks on phone: Not on file     Gets together: Not on file     Attends Taoist service: Not on file     Active member of club or organization: Not on file     Attends meetings of clubs or organizations: Not on file     Relationship status: Not on file    Intimate partner violence     Fear of current or ex partner: Not on file     Emotionally abused: Not on file     Physically abused: Not on file     Forced sexual activity: Not on file   Other Topics Concern    Not on file   Social History Narrative    Not on file       Current Outpatient Medications:     acetaminophen (TYLENOL) 500 mg tablet, Take 1,000 mg by mouth every 6 (six) hours as needed for mild pain, Disp: , Rfl:     Ascorbic Acid (VITAMIN C) 100 MG tablet, Take 100 mg by mouth daily, Disp: , Rfl:     b complex vitamins tablet, Take 1 tablet by mouth daily, Disp: , Rfl:     cholecalciferol (VITAMIN D3) 400 units tablet, Take 400 Units by mouth daily, Disp: , Rfl:     diclofenac sodium (VOLTAREN) 50 mg EC tablet, Take 50 mg by mouth 3 (three) times a day as needed, Disp: , Rfl: 0    fluocinonide (LIDEX) 0 05 % cream, Apply topically 2 (two) times a day To the small rash on your arms and back   DO NOT apply to the large wound on your back , Disp: 30 g, Rfl: 0    glucosamine-chondroitin 500-400 MG tablet, Take 1 tablet by mouth 2 (two) times a day , Disp: , Rfl:     Multiple Vitamins-Minerals (MULTIVITAMIN WITH MINERALS) tablet, Take 1 tablet by mouth daily, Disp: , Rfl:     vitamin E 100 UNIT capsule, Take 100 Units by mouth daily, Disp: , Rfl:   No Known Allergies  Vitals:    09/09/20 1345   BP: 150/94   Pulse: 77   Resp: 16   Temp: 97 6 °F (36 4 °C)       Physical Exam  Constitutional:       Appearance: Normal appearance  HENT:      Head: Normocephalic and atraumatic  Nose: Nose normal    Eyes:      General: No scleral icterus  Extraocular Movements: Extraocular movements intact  Pupils: Pupils are equal, round, and reactive to light  Neck:      Musculoskeletal: Normal range of motion and neck supple  Cardiovascular:      Rate and Rhythm: Normal rate and regular rhythm  Heart sounds: Normal heart sounds  Pulmonary:      Effort: Pulmonary effort is normal       Breath sounds: Normal breath sounds  Abdominal:      General: Abdomen is flat  Palpations: Abdomen is soft  Musculoskeletal: Normal range of motion  Skin:     General: Skin is warm and dry  Comments: Back excision graft site looks good  She has evidence of some keloid formation, which was consistent with prior scar she has had in the past    Neurological:      General: No focal deficit present  Mental Status: She is alert and oriented to person, place, and time  Psychiatric:         Mood and Affect: Mood normal          Behavior: Behavior normal          Thought Content: Thought content normal          Judgment: Judgment normal            Results:  Labs:  none    Imaging  No results found  I reviewed the above laboratory and imaging data  Discussion/Summary:  History of locally advanced basal cell cancer post excision and grafting  No evidence of recurrence    Plan on follow-up in 6 months for surveillance per kaylee

## 2020-09-09 NOTE — LETTER
September 9, 2020     Christopher Hudson MD  [address] [facility] [address] [facility]  [address]    Patient: Jennifer Guaman   YOB: 1950   Date of Visit: 9/9/2020       Dear Dr Christopher Hudson:    Thank you for referring Jennifer Guaman to me for evaluation  Below are my notes for this consultation  If you have questions, please do not hesitate to call me  I look forward to following your patient along with you  Sincerely,        Jourdan Parham MD        CC: MD Jourdan Swanson MD  9/9/2020  2:04 PM  Sign when Signing Visit     Surgical Oncology Follow Up       [address] [facility]  [address] [facility] [address]    Jennifer Guaman  1950  [id number]  [address] [facility] [facility]  [facility] [place]  [address] [facility] [address]    Chief Complaint   Patient presents with    Follow-up     6 MONTH FOLLOW UP       Assessment/Plan:    No problem-specific Assessment & Plan notes found for this encounter  Diagnoses and all orders for this visit:    Basosquamous carcinoma of skin        Advance Care Planning/Advance Directives:  Discussed disease status, cancer treatment plans and/or cancer treatment goals with the patient  Oncology History   Basosquamous carcinoma of skin (Resolved)   7/17/2019 Biopsy    Skin Back, excisional biopsy of back mass:  - Ulcerated basosquamous carcinoma, extending into deep reticular dermis and superficial     subcutis (6 4 mm deep, Donell's level IV-V)  - No perineural or lymphovascular invasion identified   - Peripheral and deep peripheral resection margins involved by invasive carcinoma         8/22/2019 Surgery    Excision of back basosquamous carcinoma (Dr Ирина Obregon and Dr Reed San):   - Basal cell carcinoma with invasion into underlying skeletal muscle  - No tumor is seen at margins of excision     Right back deep margin tissue:  - Primarily fatty tissue  - No malignancy seen         History of Present Illness:  Patient is a 79-year-old woman here for follow-up visit status post excision of basal cell cancer from her back 1 year ago  -Interval History:  No issues or complaints since her last visit  Review of Systems:  Review of Systems   Constitutional: Negative  HENT: Negative  Eyes: Negative  Respiratory: Negative  Cardiovascular: Negative  Gastrointestinal: Negative  Endocrine: Negative  Genitourinary: Negative  Musculoskeletal: Negative  Skin: Negative  Allergic/Immunologic: Negative  Neurological: Negative  Hematological: Negative  Psychiatric/Behavioral: Negative          Patient Active Problem List   Diagnosis    Wound, open, back    Open wound of right side of back    Solar dermatitis    Gallbladder dilatation    Constipation     Past Medical History:   Diagnosis Date    Seasonal allergies      Past Surgical History:   Procedure Laterality Date     SECTION      COMPLEX WOUND CLOSURE TO EXTREMITY Bilateral 2019    Procedure: COMPLEX WOUND CLOSURE, RIGHT BACK;  Surgeon: Aryan Barragan MD;  [facility];  Service: Plastics    FULL THICKNESS SKIN GRAFT N/A 2019    Procedure: SKIN GRAFT FULL THICKNESS  (FTSG) EXTREMITY;  Surgeon: Aryan Barragan MD;  [facility];  Service: Plastics    MASS EXCISION N/A 2019    Procedure: EXCISION  BIOPSY LESION/MASS BACK;  Surgeon: Zacarias Enriquez MD;  [facility];  Service: General    SKIN LESION EXCISION Bilateral 2019    Procedure: EXCISION WIDE LESION RIGHT BACK;  Surgeon: Lalit Nunn MD;  [facility];  Service: Surgical Oncology     Family History   Problem Relation Age of Onset    Heart disease Mother     Bone cancer Father     Cancer Brother         neuroblastoma     Social History     Socioeconomic History    Marital status: /Civil Union     Spouse name: Not on file    Number of children: Not on file  Years of education: Not on file    Highest education level: Not on file   Occupational History    Not on file   Social Needs    Financial resource strain: Not on file    Food insecurity     Worry: Not on file     Inability: Not on file    Transportation needs     Medical: Not on file     Non-medical: Not on file   Tobacco Use    Smoking status: Former Smoker     Packs/day: 0 00     Types: Cigarettes    Smokeless tobacco: Never Used    Tobacco comment: no cig use as of 8/12/19 , using rare CBD oil vapor   Substance and Sexual Activity    Alcohol use: Not Currently    Drug use: Not Currently    Sexual activity: Not on file   Lifestyle    Physical activity     Days per week: Not on file     Minutes per session: Not on file    Stress: Not on file   Relationships    Social connections     Talks on phone: Not on file     Gets together: Not on file     Attends Anglican service: Not on file     Active member of club or organization: Not on file     Attends meetings of clubs or organizations: Not on file     Relationship status: Not on file    Intimate partner violence     Fear of current or ex partner: Not on file     Emotionally abused: Not on file     Physically abused: Not on file     Forced sexual activity: Not on file   Other Topics Concern    Not on file   Social History Narrative    Not on file       Current Outpatient Medications:     acetaminophen (TYLENOL) 500 mg tablet, Take 1,000 mg by mouth every 6 (six) hours as needed for mild pain, Disp: , Rfl:     Ascorbic Acid (VITAMIN C) 100 MG tablet, Take 100 mg by mouth daily, Disp: , Rfl:     b complex vitamins tablet, Take 1 tablet by mouth daily, Disp: , Rfl:     cholecalciferol (VITAMIN D3) 400 units tablet, Take 400 Units by mouth daily, Disp: , Rfl:     diclofenac sodium (VOLTAREN) 50 mg EC tablet, Take 50 mg by mouth 3 (three) times a day as needed, Disp: , Rfl: 0    fluocinonide (LIDEX) 0 05 % cream, Apply topically 2 (two) times a day To the small rash on your arms and back  DO NOT apply to the large wound on your back , Disp: 30 g, Rfl: 0    glucosamine-chondroitin 500-400 MG tablet, Take 1 tablet by mouth 2 (two) times a day , Disp: , Rfl:     Multiple Vitamins-Minerals (MULTIVITAMIN WITH MINERALS) tablet, Take 1 tablet by mouth daily, Disp: , Rfl:     vitamin E 100 UNIT capsule, Take 100 Units by mouth daily, Disp: , Rfl:   No Known Allergies  Vitals:    09/09/20 1345   BP: 150/94   Pulse: 77   Resp: 16   Temp: 97 6 °F (36 4 °C)       Physical Exam  Constitutional:       Appearance: Normal appearance  HENT:      Head: Normocephalic and atraumatic  Nose: Nose normal    Eyes:      General: No scleral icterus  Extraocular Movements: Extraocular movements intact  Pupils: Pupils are equal, round, and reactive to light  Neck:      Musculoskeletal: Normal range of motion and neck supple  Cardiovascular:      Rate and Rhythm: Normal rate and regular rhythm  Heart sounds: Normal heart sounds  Pulmonary:      Effort: Pulmonary effort is normal       Breath sounds: Normal breath sounds  Abdominal:      General: Abdomen is flat  Palpations: Abdomen is soft  Musculoskeletal: Normal range of motion  Skin:     General: Skin is warm and dry  Comments: Back excision graft site looks good  She has evidence of some keloid formation, which was consistent with prior scar she has had in the past    Neurological:      General: No focal deficit present  Mental Status: She is alert and oriented to person, place, and time  Psychiatric:         Mood and Affect: Mood normal          Behavior: Behavior normal          Thought Content: Thought content normal          Judgment: Judgment normal            Results:  Labs:  none    Imaging  No results found  I reviewed the above laboratory and imaging data  Discussion/Summary:  History of locally advanced basal cell cancer post excision and grafting    No evidence of recurrence    Plan on follow-up in 6 months for surveillance per kaylee

## 2020-10-01 ENCOUNTER — DOCUMENTATION (OUTPATIENT)
Dept: RADIATION ONCOLOGY | Facility: HOSPITAL | Age: 70
End: 2020-10-01

## 2021-01-19 ENCOUNTER — OFFICE VISIT (OUTPATIENT)
Dept: DERMATOLOGY | Facility: CLINIC | Age: 71
End: 2021-01-19
Payer: COMMERCIAL

## 2021-01-19 VITALS — TEMPERATURE: 94.9 F

## 2021-01-19 DIAGNOSIS — L82.1 SEBORRHEIC KERATOSIS: ICD-10-CM

## 2021-01-19 DIAGNOSIS — Z85.828 HISTORY OF SKIN CANCER: ICD-10-CM

## 2021-01-19 DIAGNOSIS — D22.9 NEVUS: ICD-10-CM

## 2021-01-19 DIAGNOSIS — L98.9 UNKNOWN SKIN LESION: Primary | ICD-10-CM

## 2021-01-19 DIAGNOSIS — Z13.89 SCREENING FOR SKIN CONDITION: ICD-10-CM

## 2021-01-19 PROCEDURE — 11104 PUNCH BX SKIN SINGLE LESION: CPT | Performed by: DERMATOLOGY

## 2021-01-19 PROCEDURE — 88305 TISSUE EXAM BY PATHOLOGIST: CPT | Performed by: STUDENT IN AN ORGANIZED HEALTH CARE EDUCATION/TRAINING PROGRAM

## 2021-01-19 PROCEDURE — 99213 OFFICE O/P EST LOW 20 MIN: CPT | Performed by: DERMATOLOGY

## 2021-01-19 NOTE — PROGRESS NOTES
[address]  [address] [facility] [address]  [phone]  [phone]     MRN: [id number] : 1950  Encounter: [id number]  Patient Information: Cari Kay  Chief complaint: 6 month check up    History of present illness:  72-year-old female presents follow-up secondary to her previous history of large basal squamous neoplasm on her back as well as basal cell carcinoma on the left neck which we excised last visit patient without any complaints  Patient also followed by Dr Jacky Mulligan    Past Medical History:   Diagnosis Date    Seasonal allergies      Past Surgical History:   Procedure Laterality Date     SECTION      COMPLEX WOUND CLOSURE TO EXTREMITY Bilateral 2019    Procedure: COMPLEX WOUND CLOSURE, RIGHT BACK;  Surgeon: Misti Wolfe MD;  [facility];  Service: Plastics    FULL THICKNESS SKIN GRAFT N/A 2019    Procedure: SKIN GRAFT FULL THICKNESS  (FTSG) EXTREMITY;  Surgeon: Misti Wolfe MD;  [facility];  Service: Plastics    MASS EXCISION N/A 2019    Procedure: EXCISION  BIOPSY LESION/MASS BACK;  Surgeon: Ibrahima Martínez MD;  [facility];  Service: General    SKIN LESION EXCISION Bilateral 2019    Procedure: EXCISION WIDE LESION RIGHT BACK;  Surgeon: Almita Hernandez MD;  [facility];  Service: Surgical Oncology     Social History   Social History     Substance and Sexual Activity   Alcohol Use Not Currently     Social History     Substance and Sexual Activity   Drug Use Not Currently     Social History     Tobacco Use   Smoking Status Former Smoker    Packs/day: 0 00    Types: Cigarettes   Smokeless Tobacco Never Used   Tobacco Comment    no cig use as of 21 , using rare CBD oil vapor     Family History   Problem Relation Age of Onset    Heart disease Mother     Bone cancer Father     Cancer Brother         neuroblastoma     Meds/Allergies   No Known Allergies    Meds:  Prior to Admission medications    Medication Sig Start Date End Date Taking? Authorizing Provider   Ascorbic Acid (VITAMIN C) 100 MG tablet Take 100 mg by mouth daily   Yes Historical Provider, MD   b complex vitamins tablet Take 1 tablet by mouth daily   Yes Historical Provider, MD   cholecalciferol (VITAMIN D3) 400 units tablet Take 400 Units by mouth daily   Yes Historical Provider, MD   diclofenac sodium (VOLTAREN) 50 mg EC tablet Take 50 mg by mouth 3 (three) times a day as needed 12/16/19  Yes Historical Provider, MD   glucosamine-chondroitin 500-400 MG tablet Take 1 tablet by mouth 2 (two) times a day    Yes Historical Provider, MD   Multiple Vitamins-Minerals (MULTIVITAMIN WITH MINERALS) tablet Take 1 tablet by mouth daily   Yes Historical Provider, MD   vitamin E 100 UNIT capsule Take 100 Units by mouth daily   Yes Historical Provider, MD   acetaminophen (TYLENOL) 500 mg tablet Take 1,000 mg by mouth every 6 (six) hours as needed for mild pain    Historical Provider, MD   fluocinonide (LIDEX) 0 05 % cream Apply topically 2 (two) times a day To the small rash on your arms and back  DO NOT apply to the large wound on your back    Patient not taking: Reported on 1/19/2021 7/18/19   Phoenix Chauhan MD       Subjective:     Review of Systems:    General: negative for - chills, fatigue, fever,  weight gain or weight loss  Psychological: negative for - anxiety, behavioral disorder, concentration difficulties, decreased libido, depression, irritability, memory difficulties, mood swings, sleep disturbances or suicidal ideation  ENT: negative for - hearing difficulties , nasal congestion, nasal discharge, oral lesions, sinus pain, sneezing, sore throat  Allergy and Immunology: negative for - hives, insect bite sensitivity,  Hematological and Lymphatic: negative for - bleeding problems, blood clots,bruising, swollen lymph nodes  Endocrine: negative for - hair pattern changes, hot flashes, malaise/lethargy, mood swings, palpitations, polydipsia/polyuria, skin changes, temperature intolerance or unexpected weight change  Respiratory: negative for - cough, hemoptysis, orthopnea, shortness of breath, or wheezing  Cardiovascular: negative for - chest pain, dyspnea on exertion, edema,  Gastrointestinal: negative for - abdominal pain, nausea/vomiting  Genito-Urinary: negative for - dysuria, incontinence, irregular/heavy menses or urinary frequency/urgency  Musculoskeletal: negative for - gait disturbance, joint pain, joint stiffness, joint swelling, muscle pain, muscular weakness  Dermatological:  As in HPI  Neurological: negative for confusion, dizziness, headaches, impaired coordination/balance, memory loss, numbness/tingling, seizures, speech problems, tremors or weakness       Objective:   Temp (!) 94 9 °F (34 9 °C)     Physical Exam:    General Appearance:    Alert, cooperative, no distress   Head:    Normocephalic, without obvious abnormality, atraumatic           Skin:   A full skin exam was performed including scalp, head scalp, eyes, ears, nose, lips, neck, chest, axilla, abdomen, back, buttocks, bilateral upper extremities, bilateral lower extremities, hands, feet, fingers, toes, fingernails, and toenails previous sites skin cancer well healed on the neck in other places there appears to be dermal papule areas noted on the previous scar site on the back  Nothing else of concern noted normal keratotic papules greasy stuck on appearance        Punch Biopsy Procedure Note    Pre-operative Diagnosis:  Rule out recurrence basal cell versus keloid    Plan:  1  Instructed to keep the wound dry and covered for 24-48h and clean thereafter  2  Warning signs of infection were reviewed  3  Recommended that the patient use acetaminophen as needed for pain     Location(s):  Mid back    Indications:  Delineate process    Anesthesia: Lidocaine 1% with epinephrine without added sodium bicarbonate    Procedure Details     Patient informed of the risks (including bleeding,scaring and infection) and benefits of the procedure explained  Verbal informed consent obtained  The lesion and surrounding area was given a sterile prep using alcohol  The skin was then stretched perpendicular to the skin tension lines and the specimen obtained  using the 4mm punch with a forceps and iris scissor  Gel foam was used for hemostasis along with a pressure bandage  The specimen was sent for pathologic examination  The patient tolerated the procedure well  Wound care instructions given to patient  Condition:  Stable    Complications:  none  Assessment:     1  Unknown skin lesion     2  Seborrheic keratosis     3  Nevus     4  History of skin cancer     5  Screening for skin condition           Plan:   Previous site of skin cancer question of keloid versus recurrence tumor will see what the biopsy goes and consider further options pending results  Nevi reviewed the concept of ABCDE and ugly duckling nothing markedly atypical patient reassured  Seborrheic keratosis patient reassured these are normal growths we acquire with age no treatment needed  History of skin cancer in no recurrence nothing else atypical sunblock recommended follow-up in 6 months  Screening for dermatologic disorders nothing else of concern noted on complete exam follow-up in 6 months    Saira Mayers MD  1/19/2021,1:41 PM    Portions of the record may have been created with voice recognition software   Occasional wrong word or "sound a like" substitutions may have occurred due to the inherent limitations of voice recognition software   Read the chart carefully and recognize, using context, where substitutions have occurred

## 2021-01-19 NOTE — PATIENT INSTRUCTIONS
Previous site of skin cancer question of keloid versus recurrence tumor will see what the biopsy goes and consider further options pending results  Nevi reviewed the concept of ABCDE and ugly duckling nothing markedly atypical patient reassured  Seborrheic keratosis patient reassured these are normal growths we acquire with age no treatment needed  History of skin cancer in no recurrence nothing else atypical sunblock recommended follow-up in 6 months  Screening for dermatologic disorders nothing else of concern noted on complete exam follow-up in 6 months  Wound care instructions given to patient

## 2021-01-25 ENCOUNTER — TELEPHONE (OUTPATIENT)
Dept: DERMATOLOGY | Facility: CLINIC | Age: 71
End: 2021-01-25

## 2021-01-25 NOTE — TELEPHONE ENCOUNTER
----- Message from Ricardo Gambino MD sent at 1/25/2021  7:54 AM EST -----  Please call her of normal pathology

## 2021-08-05 ENCOUNTER — OFFICE VISIT (OUTPATIENT)
Dept: DERMATOLOGY | Facility: CLINIC | Age: 71
End: 2021-08-05
Payer: COMMERCIAL

## 2021-08-05 DIAGNOSIS — Z13.89 SCREENING FOR SKIN CONDITION: ICD-10-CM

## 2021-08-05 DIAGNOSIS — L82.1 SEBORRHEIC KERATOSIS: ICD-10-CM

## 2021-08-05 DIAGNOSIS — Z85.828 HISTORY OF SKIN CANCER: ICD-10-CM

## 2021-08-05 DIAGNOSIS — L57.0 LICHENOID KERATOSIS: Primary | ICD-10-CM

## 2021-08-05 PROCEDURE — 99213 OFFICE O/P EST LOW 20 MIN: CPT | Performed by: DERMATOLOGY

## 2021-08-05 NOTE — PROGRESS NOTES
[address]  [address] [facility] [address]  [phone]  [phone]     MRN: [id number] : 1950  Encounter: [id number]  Patient Information: Marco Antonio Jaffe  Chief complaint:  Six-month checkup    History of present illness:  22-year-old female with previous history of skin cancer presents for overall checkup no specific concerns noted  Past Medical History:   Diagnosis Date    Seasonal allergies      Past Surgical History:   Procedure Laterality Date     SECTION      COMPLEX WOUND CLOSURE TO EXTREMITY Bilateral 2019    Procedure: COMPLEX WOUND CLOSURE, RIGHT BACK;  Surgeon: Yvette Love MD;  [facility];  Service: Plastics    FULL THICKNESS SKIN GRAFT N/A 2019    Procedure: SKIN GRAFT FULL THICKNESS  (FTSG) EXTREMITY;  Surgeon: Yvette Love MD;  [facility];  Service: Plastics    MASS EXCISION N/A 2019    Procedure: EXCISION  BIOPSY LESION/MASS BACK;  Surgeon: Marianela Rose MD;  [facility];  Service: General    SKIN LESION EXCISION Bilateral 2019    Procedure: EXCISION WIDE LESION RIGHT BACK;  Surgeon: Fuentes Martell MD;  [facility];  Service: Surgical Oncology     Social History   Social History     Substance and Sexual Activity   Alcohol Use Not Currently     Social History     Substance and Sexual Activity   Drug Use Not Currently     Social History     Tobacco Use   Smoking Status Former Smoker    Packs/day: 0 00    Types: Cigarettes   Smokeless Tobacco Never Used   Tobacco Comment    no cig use as of 21 , using rare CBD oil vapor     Family History   Problem Relation Age of Onset    Heart disease Mother     Bone cancer Father     Cancer Brother         neuroblastoma     Meds/Allergies   No Known Allergies    Meds:  Prior to Admission medications    Medication Sig Start Date End Date Taking?  Authorizing Provider   Ascorbic Acid (VITAMIN C) 100 MG tablet Take 100 mg by mouth daily   Yes Historical Provider, MD   b complex vitamins tablet Take 1 tablet by mouth daily   Yes Historical Provider, MD   cholecalciferol (VITAMIN D3) 400 units tablet Take 400 Units by mouth daily   Yes Historical Provider, MD   diclofenac sodium (VOLTAREN) 50 mg EC tablet Take 50 mg by mouth 3 (three) times a day as needed 12/16/19  Yes Historical Provider, MD   glucosamine-chondroitin 500-400 MG tablet Take 1 tablet by mouth 2 (two) times a day    Yes Historical Provider, MD   Multiple Vitamins-Minerals (MULTIVITAMIN WITH MINERALS) tablet Take 1 tablet by mouth daily   Yes Historical Provider, MD   vitamin E 100 UNIT capsule Take 100 Units by mouth daily   Yes Historical Provider, MD   acetaminophen (TYLENOL) 500 mg tablet Take 1,000 mg by mouth every 6 (six) hours as needed for mild pain  Patient not taking: Reported on 8/5/2021    Historical Provider, MD   fluocinonide (LIDEX) 0 05 % cream Apply topically 2 (two) times a day To the small rash on your arms and back  DO NOT apply to the large wound on your back    Patient not taking: Reported on 1/19/2021 7/18/19   Esau Saunders MD       Subjective:     Review of Systems:    General: negative for - chills, fatigue, fever,  weight gain or weight loss  Psychological: negative for - anxiety, behavioral disorder, concentration difficulties, decreased libido, depression, irritability, memory difficulties, mood swings, sleep disturbances or suicidal ideation  ENT: negative for - hearing difficulties , nasal congestion, nasal discharge, oral lesions, sinus pain, sneezing, sore throat  Allergy and Immunology: negative for - hives, insect bite sensitivity,  Hematological and Lymphatic: negative for - bleeding problems, blood clots,bruising, swollen lymph nodes  Endocrine: negative for - hair pattern changes, hot flashes, malaise/lethargy, mood swings, palpitations, polydipsia/polyuria, skin changes, temperature intolerance or unexpected weight of concern noted on complete exam follow-up in 6 months    Jillene Siemens, MD  8/5/2021,2:11 PM    Portions of the record may have been created with voice recognition software   Occasional wrong word or "sound a like" substitutions may have occurred due to the inherent limitations of voice recognition software   Read the chart carefully and recognize, using context, where substitutions have occurred

## 2022-02-17 ENCOUNTER — OFFICE VISIT (OUTPATIENT)
Dept: DERMATOLOGY | Facility: CLINIC | Age: 72
End: 2022-02-17
Payer: COMMERCIAL

## 2022-02-17 VITALS — BODY MASS INDEX: 28.35 KG/M2 | TEMPERATURE: 97.9 F | WEIGHT: 160 LBS | HEIGHT: 63 IN

## 2022-02-17 DIAGNOSIS — Z85.828 HISTORY OF SKIN CANCER: ICD-10-CM

## 2022-02-17 DIAGNOSIS — Z13.89 SCREENING FOR SKIN CONDITION: ICD-10-CM

## 2022-02-17 DIAGNOSIS — L82.1 SEBORRHEIC KERATOSIS: Primary | ICD-10-CM

## 2022-02-17 PROCEDURE — 99213 OFFICE O/P EST LOW 20 MIN: CPT | Performed by: DERMATOLOGY

## 2022-02-17 NOTE — PROGRESS NOTES
[address]  [facility]  [address] [address]  [phone]  [phone]     MRN: [id number] : 1950  Encounter: [id number]  Patient Information: Armida Santiago  Chief complaint: 6 month check up    History of present illness:  45-year-old female presents for follow-up secondary to previous history of skin cancer  no specific concerns noted  Past Medical History:   Diagnosis Date    Seasonal allergies      Past Surgical History:   Procedure Laterality Date     SECTION      COMPLEX WOUND CLOSURE TO EXTREMITY Bilateral 2019    Procedure: COMPLEX WOUND CLOSURE, RIGHT BACK;  Surgeon: Lauren Vasquez MD;  [facility];  Service: Plastics    FULL THICKNESS SKIN GRAFT N/A 2019    Procedure: SKIN GRAFT FULL THICKNESS  (FTSG) EXTREMITY;  Surgeon: Lauren Vasquez MD;  [facility];  Service: Plastics    MASS EXCISION N/A 2019    Procedure: EXCISION  BIOPSY LESION/MASS BACK;  Surgeon: Valdez Michel MD;  [facility];  Service: General    SKIN LESION EXCISION Bilateral 2019    Procedure: EXCISION WIDE LESION RIGHT BACK;  Surgeon: Kadie Espitia MD;  [facility];  Service: Surgical Oncology     Social History   Social History     Substance and Sexual Activity   Alcohol Use Not Currently     Social History     Substance and Sexual Activity   Drug Use Not Currently     Social History     Tobacco Use   Smoking Status Former Smoker    Packs/day: 0 00    Types: Cigarettes   Smokeless Tobacco Never Used   Tobacco Comment    no cig use as of 21 , using rare CBD oil vapor     Family History   Problem Relation Age of Onset    Heart disease Mother     Bone cancer Father     Cancer Brother         neuroblastoma     Meds/Allergies   No Known Allergies    Meds:  Prior to Admission medications    Medication Sig Start Date End Date Taking?  Authorizing Provider   b complex vitamins tablet Take 1 tablet by mouth daily   Yes Historical Provider, MD   cholecalciferol (VITAMIN D3) 400 units tablet Take 400 Units by mouth daily   Yes Historical Provider, MD   glucosamine-chondroitin 500-400 MG tablet Take 1 tablet by mouth 2 (two) times a day    Yes Historical Provider, MD   Multiple Vitamins-Minerals (MULTIVITAMIN WITH MINERALS) tablet Take 1 tablet by mouth daily   Yes Historical Provider, MD   vitamin E 100 UNIT capsule Take 100 Units by mouth daily   Yes Historical Provider, MD   acetaminophen (TYLENOL) 500 mg tablet Take 1,000 mg by mouth every 6 (six) hours as needed for mild pain  Patient not taking: Reported on 8/5/2021    Historical Provider, MD   Ascorbic Acid (VITAMIN C) 100 MG tablet Take 100 mg by mouth daily  Patient not taking: Reported on 2/17/2022     Historical Provider, MD   diclofenac sodium (VOLTAREN) 50 mg EC tablet Take 50 mg by mouth 3 (three) times a day as needed 12/16/19   Historical Provider, MD   fluocinonide (LIDEX) 0 05 % cream Apply topically 2 (two) times a day To the small rash on your arms and back  DO NOT apply to the large wound on your back    Patient not taking: Reported on 1/19/2021 7/18/19   Kyra Guerrero MD       Subjective:     Review of Systems:    General: negative for - chills, fatigue, fever,  weight gain or weight loss  Psychological: negative for - anxiety, behavioral disorder, concentration difficulties, decreased libido, depression, irritability, memory difficulties, mood swings, sleep disturbances or suicidal ideation  ENT: negative for - hearing difficulties , nasal congestion, nasal discharge, oral lesions, sinus pain, sneezing, sore throat  Allergy and Immunology: negative for - hives, insect bite sensitivity,  Hematological and Lymphatic: negative for - bleeding problems, blood clots,bruising, swollen lymph nodes  Endocrine: negative for - hair pattern changes, hot flashes, malaise/lethargy, mood swings, palpitations, polydipsia/polyuria, skin changes, temperature intolerance or unexpected weight change  Respiratory: negative for - cough, hemoptysis, orthopnea, shortness of breath, or wheezing  Cardiovascular: negative for - chest pain, dyspnea on exertion, edema,  Gastrointestinal: negative for - abdominal pain, nausea/vomiting  Genito-Urinary: negative for - dysuria, incontinence, irregular/heavy menses or urinary frequency/urgency  Musculoskeletal: negative for - gait disturbance, joint pain, joint stiffness, joint swelling, muscle pain, muscular weakness  Dermatological:  As in HPI  Neurological: negative for confusion, dizziness, headaches, impaired coordination/balance, memory loss, numbness/tingling, seizures, speech problems, tremors or weakness       Objective:   Temp 97 9 °F (36 6 °C) (Temporal)   Ht 5' 3" (1 6 m)   Wt 72 6 kg (160 lb)   BMI 28 34 kg/m²     Physical Exam:    General Appearance:    Alert, cooperative, no distress   Head:    Normocephalic, without obvious abnormality, atraumatic           Skin:   A full skin exam was performed including scalp, head scalp, eyes, ears, nose, lips, neck, chest, axilla, abdomen, back, buttocks, bilateral upper extremities, bilateral lower extremities, hands, feet, fingers, toes, fingernails, and toenails normal keratotic papules with greasy stuck on appearance nothing else remarkable noted on complete exam previous site of skin cancer well healed without recurrence nothing else remarkable on complete exam     Assessment:     1  Seborrheic keratosis     2  History of skin cancer     3   Screening for skin condition           Plan:   Seborrheic keratosis patient reassured these are normal growths we acquire with age no treatment needed  History of skin cancer in no recurrence nothing else atypical sunblock recommended follow-up in 6 months  Screening for dermatologic disorders nothing else of concern noted on complete exam follow-up in 6 months    Ekta Mayer MD  2/17/2022,1:52 PM    Portions of the record may have been created with voice recognition software   Occasional wrong word or "sound a like" substitutions may have occurred due to the inherent limitations of voice recognition software   Read the chart carefully and recognize, using context, where substitutions have occurred

## 2022-02-17 NOTE — PATIENT INSTRUCTIONS
Seborrheic keratosis patient reassured these are normal growths we acquire with age no treatment needed  History of skin cancer in no recurrence nothing else atypical sunblock recommended follow-up in 6 months  Screening for dermatologic disorders nothing else of concern noted on complete exam follow-up in 6 months  used

## 2022-08-18 ENCOUNTER — OFFICE VISIT (OUTPATIENT)
Dept: DERMATOLOGY | Facility: CLINIC | Age: 72
End: 2022-08-18
Payer: COMMERCIAL

## 2022-08-18 VITALS — BODY MASS INDEX: 28.35 KG/M2 | HEIGHT: 63 IN | WEIGHT: 160 LBS

## 2022-08-18 DIAGNOSIS — L82.1 SEBORRHEIC KERATOSIS: Primary | ICD-10-CM

## 2022-08-18 DIAGNOSIS — Z13.89 SCREENING FOR SKIN CONDITION: ICD-10-CM

## 2022-08-18 DIAGNOSIS — Z85.828 HISTORY OF SKIN CANCER: ICD-10-CM

## 2022-08-18 DIAGNOSIS — D22.9 NEVUS: ICD-10-CM

## 2022-08-18 PROCEDURE — 99213 OFFICE O/P EST LOW 20 MIN: CPT | Performed by: DERMATOLOGY

## 2022-08-18 NOTE — PROGRESS NOTES
[address]  [address] [facility] [address]  [phone]  [phone]     MRN: [id number] : 1950  Encounter: [id number]  Patient Information: Jay Jay Duffy  Chief complaint:  Six-month checkup    History of present illness:  73-year-old female with history of large basal cell carcinoma excised on her back presents for overall checkup no specific concerns or changes noted been 3 years since her basal cell carcinoma was excised  Past Medical History:   Diagnosis Date    Seasonal allergies      Past Surgical History:   Procedure Laterality Date     SECTION      COMPLEX WOUND CLOSURE TO EXTREMITY Bilateral 2019    Procedure: COMPLEX WOUND CLOSURE, RIGHT BACK;  Surgeon: Becki Nguyen MD;  [facility]: [facility];  Service: Plastics    FULL THICKNESS SKIN GRAFT N/A 2019    Procedure: SKIN GRAFT FULL THICKNESS  (FTSG) EXTREMITY;  Surgeon: Becki Nguyen MD;  [facility];  Service: Plastics    MASS EXCISION N/A 2019    Procedure: EXCISION  BIOPSY LESION/MASS BACK;  Surgeon: Rita Cohen MD;  [facility]: [facility];  Service: General    SKIN LESION EXCISION Bilateral 2019    Procedure: EXCISION WIDE LESION RIGHT BACK;  Surgeon: Philip Hackett MD;  [facility];  Service: Surgical Oncology     Social History   Social History     Substance and Sexual Activity   Alcohol Use Not Currently     Social History     Substance and Sexual Activity   Drug Use Not Currently     Social History     Tobacco Use   Smoking Status Former Smoker    Packs/day: 0 00    Types: Cigarettes   Smokeless Tobacco Never Used   Tobacco Comment    no cig use as of 21 , using rare CBD oil vapor     Family History   Problem Relation Age of Onset    Heart disease Mother     Bone cancer Father     Cancer Brother         neuroblastoma     Meds/Allergies   No Known Allergies    Meds:  Prior to Admission medications    Medication Sig Start Date End Date Taking? Authorizing Provider   b complex vitamins tablet Take 1 tablet by mouth daily   Yes Historical Provider, MD   cholecalciferol (VITAMIN D3) 400 units tablet Take 400 Units by mouth daily   Yes Historical Provider, MD   glucosamine-chondroitin 500-400 MG tablet Take 1 tablet by mouth 2 (two) times a day    Yes Historical Provider, MD   Multiple Vitamins-Minerals (MULTIVITAMIN WITH MINERALS) tablet Take 1 tablet by mouth daily   Yes Historical Provider, MD   vitamin E 100 UNIT capsule Take 100 Units by mouth daily   Yes Historical Provider, MD   acetaminophen (TYLENOL) 500 mg tablet Take 1,000 mg by mouth every 6 (six) hours as needed for mild pain  Patient not taking: No sig reported    Historical Provider, MD   Ascorbic Acid (VITAMIN C) 100 MG tablet Take 100 mg by mouth daily  Patient not taking: No sig reported    Historical Provider, MD   diclofenac sodium (VOLTAREN) 50 mg EC tablet Take 50 mg by mouth 3 (three) times a day as needed 12/16/19   Historical Provider, MD   fluocinonide (LIDEX) 0 05 % cream Apply topically 2 (two) times a day To the small rash on your arms and back  DO NOT apply to the large wound on your back    Patient not taking: Reported on 1/19/2021 7/18/19   Ana Maria Nagel MD       Subjective:     Review of Systems:    General: negative for - chills, fatigue, fever,  weight gain or weight loss  Psychological: negative for - anxiety, behavioral disorder, concentration difficulties, decreased libido, depression, irritability, memory difficulties, mood swings, sleep disturbances or suicidal ideation  ENT: negative for - hearing difficulties , nasal congestion, nasal discharge, oral lesions, sinus pain, sneezing, sore throat  Allergy and Immunology: negative for - hives, insect bite sensitivity,  Hematological and Lymphatic: negative for - bleeding problems, blood clots,bruising, swollen lymph nodes  Endocrine: negative for - hair pattern changes, hot flashes, malaise/lethargy, mood swings, palpitations, polydipsia/polyuria, skin changes, temperature intolerance or unexpected weight change  Respiratory: negative for - cough, hemoptysis, orthopnea, shortness of breath, or wheezing  Cardiovascular: negative for - chest pain, dyspnea on exertion, edema,  Gastrointestinal: negative for - abdominal pain, nausea/vomiting  Genito-Urinary: negative for - dysuria, incontinence, irregular/heavy menses or urinary frequency/urgency  Musculoskeletal: negative for - gait disturbance, joint pain, joint stiffness, joint swelling, muscle pain, muscular weakness  Dermatological:  As in HPI  Neurological: negative for confusion, dizziness, headaches, impaired coordination/balance, memory loss, numbness/tingling, seizures, speech problems, tremors or weakness       Objective:   Ht 5' 3" (1 6 m)   Wt 72 6 kg (160 lb)   BMI 28 34 kg/m²     Physical Exam:    General Appearance:    Alert, cooperative, no distress   Head:    Normocephalic, without obvious abnormality, atraumatic           Skin:   A full skin exam was performed including scalp, head scalp, eyes, ears, nose, lips, neck, chest, axilla, abdomen, back, buttocks, bilateral upper extremities, bilateral lower extremities, hands, feet, fingers, toes, fingernails, and toenails previous sites skin cancer well healed without recurrence normal pigmented lesions regular shape color will keratotic papules greasy stuck on appearance nothing else remarkable on complete exam     Assessment:     1  Seborrheic keratosis     2  History of skin cancer     3  Screening for skin condition     4   Nevus           Plan:   Nevi reviewed the concept of ABCDE and ugly duckling nothing markedly atypical patient reassured  Seborrheic keratosis patient reassured these are normal growths we acquire with age no treatment needed  History of skin cancer in no recurrence nothing else atypical sunblock recommended follow-up in 1 year  Screening for dermatologic disorders nothing else of concern noted on complete exam follow-up in 1 year      Alvaro Catherine MD  8/18/2022,3:15 PM    Portions of the record may have been created with voice recognition software   Occasional wrong word or "sound a like" substitutions may have occurred due to the inherent limitations of voice recognition software   Read the chart carefully and recognize, using context, where substitutions have occurred

## 2022-08-18 NOTE — PATIENT INSTRUCTIONS
Nevi reviewed the concept of ABCDE and ugly duckling nothing markedly atypical patient reassured  Seborrheic keratosis patient reassured these are normal growths we acquire with age no treatment needed  History of skin cancer in no recurrence nothing else atypical sunblock recommended follow-up in 1 year  Screening for dermatologic disorders nothing else of concern noted on complete exam follow-up in 1 year

## 2023-04-01 ENCOUNTER — TELEPHONE (OUTPATIENT)
Dept: OTHER | Facility: OTHER | Age: 73
End: 2023-04-01

## 2023-04-07 ENCOUNTER — OFFICE VISIT (OUTPATIENT)
Dept: OBGYN CLINIC | Facility: CLINIC | Age: 73
End: 2023-04-07

## 2023-04-07 VITALS
WEIGHT: 158 LBS | HEART RATE: 66 BPM | BODY MASS INDEX: 28 KG/M2 | HEIGHT: 63 IN | SYSTOLIC BLOOD PRESSURE: 142 MMHG | DIASTOLIC BLOOD PRESSURE: 85 MMHG

## 2023-04-07 DIAGNOSIS — M17.0 PRIMARY OSTEOARTHRITIS OF BOTH KNEES: Primary | ICD-10-CM

## 2023-04-07 RX ORDER — BUPIVACAINE HYDROCHLORIDE 2.5 MG/ML
4 INJECTION, SOLUTION INFILTRATION; PERINEURAL
Status: COMPLETED | OUTPATIENT
Start: 2023-04-07 | End: 2023-04-07

## 2023-04-07 RX ORDER — TRIAMCINOLONE ACETONIDE 40 MG/ML
40 INJECTION, SUSPENSION INTRA-ARTICULAR; INTRAMUSCULAR
Status: COMPLETED | OUTPATIENT
Start: 2023-04-07 | End: 2023-04-07

## 2023-04-07 RX ORDER — BUPIVACAINE HYDROCHLORIDE 2.5 MG/ML
1 INJECTION, SOLUTION INFILTRATION; PERINEURAL
Status: COMPLETED | OUTPATIENT
Start: 2023-04-07 | End: 2023-04-07

## 2023-04-07 RX ADMIN — BUPIVACAINE HYDROCHLORIDE 4 ML: 2.5 INJECTION, SOLUTION INFILTRATION; PERINEURAL at 13:57

## 2023-04-07 RX ADMIN — TRIAMCINOLONE ACETONIDE 40 MG: 40 INJECTION, SUSPENSION INTRA-ARTICULAR; INTRAMUSCULAR at 13:57

## 2023-04-07 RX ADMIN — BUPIVACAINE HYDROCHLORIDE 1 ML: 2.5 INJECTION, SOLUTION INFILTRATION; PERINEURAL at 13:57

## 2023-04-07 NOTE — PROGRESS NOTES
Assessment/Plan:  Assessment/Plan   Diagnoses and all orders for this visit:    Primary osteoarthritis of both knees  -     Large joint arthrocentesis: bilateral knee        77-year-old active female with pain in both knees many years duration  Discussed with patient physical then, primary studies, impression, and plan  X-ray of both knees noted for mild degenerative changes  Physical exam right knee noted for mild swelling  She has mild tenderness medial joint line and patella facet both knees  She has full extension and flexion to 120 degrees both knees  There is no appreciable collateral ligament laxity  There is no groin pain with BON and FADDIR of the hips  Clinical impression is she has symptoms from combination of degenerative changes and abnormal patellofemoral mechanics  I discussed treatment regimen of supplements and steroid injection  Surgery not warranted  I administered mixtures of 3 cc 0 25% bupivacaine and 1 cc Kenalog to each knee without complication  She is to continue with glucosamine supplements  She is to take turmeric at least 1000 mg daily and and tart cherry  at least 1000 mg daily  She will follow up as needed  Subjective:   Patient ID: Ahsan Elliott is a 81 y o  female  Chief Complaint   Patient presents with   • Left Knee - Pain   • Right Knee - Pain       77-year-old active female presents evaluation pain both knees many years duration  She was diagnosed with arthritis and patella instability  She was treated in the past with formal therapy  She reports stability has improved with exercise program however she has pain that is still bothersome  Has pain described localized mainly to the anterior aspect both knees, none radiating, worse with activity, associate with clicking, associated with swelling, and improved with resting  She manages symptoms with icing and topical solutions  She also takes glucosamine supplements    She does about 12 miles of recumbent bike and a few miles of walking each day  Knee Pain  This is a chronic problem  The current episode started more than 1 year ago  The problem occurs daily  The problem has been gradually worsening  Associated symptoms include arthralgias and joint swelling  Pertinent negatives include no abdominal pain, chest pain, chills, fever, numbness, rash, sore throat or weakness  The symptoms are aggravated by standing and walking  She has tried rest, ice and position changes (Topical anesthetic) for the symptoms  The treatment provided mild relief  The following portions of the patient's history were reviewed and updated as appropriate: She  has a past medical history of Seasonal allergies  She  has a past surgical history that includes  section; Mass excision (N/A, 2019); Skin lesion excision (Bilateral, 2019); COMPLEX WOUND CLOSURE TO EXTREMITY (Bilateral, 2019); and FULL THICKNESS SKIN GRAFT (N/A, 2019)  Her family history includes Bone cancer in her father; Cancer in her brother; Heart disease in her mother  She  reports that she has quit smoking  Her smoking use included cigarettes  She has never used smokeless tobacco  She reports that she does not currently use alcohol  She reports that she does not currently use drugs  She has No Known Allergies       Review of Systems   Constitutional: Negative for chills and fever  HENT: Negative for sore throat  Eyes: Negative for visual disturbance  Respiratory: Negative for shortness of breath  Cardiovascular: Negative for chest pain  Gastrointestinal: Negative for abdominal pain  Genitourinary: Negative for flank pain  Musculoskeletal: Positive for arthralgias and joint swelling  Skin: Negative for rash and wound  Neurological: Negative for weakness and numbness  Hematological: Does not bruise/bleed easily  Psychiatric/Behavioral: Negative for self-injury         Objective:  Vitals:    23 1328 "  BP: 142/85   Pulse: 66   Weight: 71 7 kg (158 lb)   Height: 5' 3\" (1 6 m)     Right Ankle Exam     Muscle Strength   Dorsiflexion:  5/5  Plantar flexion:  5/5      Left Ankle Exam     Muscle Strength   Dorsiflexion:  5/5   Plantar flexion:  5/5       Right Knee Exam     Muscle Strength   The patient has normal right knee strength  Tenderness   The patient is experiencing tenderness in the medial joint line (Patella facet)  Range of Motion   Extension: normal   Flexion: 130     Tests   Varus: negative Valgus: negative    Other   Swelling: mild      Left Knee Exam     Muscle Strength   The patient has normal left knee strength  Tenderness   The patient is experiencing tenderness in the medial joint line (Patella facet)  Range of Motion   Extension: normal   Flexion: 130     Tests   Varus: negative Valgus: negative    Other   Swelling: none      Right Hip Exam     Tests   BON: negative    Comments:  Negative FADDIR      Left Hip Exam     Tests   BON: negative    Comments:  Negative FADDIR          Strength/Myotome Testing     Left Ankle/Foot   Dorsiflexion: 5  Plantar flexion: 5    Right Ankle/Foot   Dorsiflexion: 5  Plantar flexion: 5        Physical Exam  Vitals and nursing note reviewed  Constitutional:       General: She is not in acute distress  Appearance: She is well-developed  She is not ill-appearing or diaphoretic  HENT:      Head: Normocephalic  Right Ear: External ear normal       Left Ear: External ear normal    Eyes:      Conjunctiva/sclera: Conjunctivae normal    Neck:      Trachea: No tracheal deviation  Cardiovascular:      Rate and Rhythm: Normal rate  Pulmonary:      Effort: Pulmonary effort is normal  No respiratory distress  Abdominal:      General: There is no distension  Musculoskeletal:         General: Swelling and tenderness present  No deformity or signs of injury  Skin:     General: Skin is warm and dry  Coloration: Skin is not jaundiced or pale   " "  Neurological:      Mental Status: She is alert and oriented to person, place, and time  Psychiatric:         Mood and Affect: Mood normal          Behavior: Behavior normal          Thought Content: Thought content normal          Judgment: Judgment normal          Large joint arthrocentesis: bilateral knee  Universal Protocol:  Consent: Verbal consent obtained  Risks and benefits: risks, benefits and alternatives were discussed  Consent given by: patient  Time out: Immediately prior to procedure a \"time out\" was called to verify the correct patient, procedure, equipment, support staff and site/side marked as required  Patient understanding: patient states understanding of the procedure being performed  Patient consent: the patient's understanding of the procedure matches consent given  Procedure consent: procedure consent matches procedure scheduled  Relevant documents: relevant documents present and verified  Test results: test results available and properly labeled  Site marked: the operative site was marked  Radiology Images displayed and confirmed   If images not available, report reviewed: imaging studies available  Required items: required blood products, implants, devices, and special equipment available  Patient identity confirmed: verbally with patient    Supporting Documentation  Indications: pain   Procedure Details  Location: knee - bilateral knee  Preparation: Patient was prepped and draped in the usual sterile fashion  Needle gauge: 21G 2\"  Ultrasound guidance: no  Approach: anterolateral    Medications (Right): 4 mL bupivacaine 0 25 %; 1 mL bupivacaine 0 25 %; 40 mg triamcinolone acetonide 40 mg/mLMedications (Left): 1 mL bupivacaine 0 25 %; 4 mL bupivacaine 0 25 %; 40 mg triamcinolone acetonide 40 mg/mL   Patient tolerance: patient tolerated the procedure well with no immediate complications  Dressing:  Sterile dressing applied              "

## 2023-08-24 ENCOUNTER — OFFICE VISIT (OUTPATIENT)
Age: 73
End: 2023-08-24
Payer: COMMERCIAL

## 2023-08-24 VITALS — BODY MASS INDEX: 27.99 KG/M2 | HEIGHT: 63 IN

## 2023-08-24 DIAGNOSIS — L82.1 SEBORRHEIC KERATOSIS: ICD-10-CM

## 2023-08-24 DIAGNOSIS — Z85.828 HISTORY OF SKIN CANCER: ICD-10-CM

## 2023-08-24 DIAGNOSIS — Z13.89 SCREENING FOR SKIN CONDITION: Primary | ICD-10-CM

## 2023-08-24 DIAGNOSIS — D22.9 NEVUS: ICD-10-CM

## 2023-08-24 DIAGNOSIS — D18.01 CHERRY ANGIOMA: ICD-10-CM

## 2023-08-24 PROCEDURE — 99213 OFFICE O/P EST LOW 20 MIN: CPT | Performed by: DERMATOLOGY

## 2023-08-24 NOTE — PATIENT INSTRUCTIONS
BASAL CELL CARCINOMA    What is basal cell carcinoma? Basal cell carcinoma (BCC) is a common, locally invasive, keratinocytic, or non-melanoma, skin cancer. It is also known as rodent ulcer and basalioma. Patients with BCC often develop multiple primary tumours over time. Who gets basal cell carcinoma? Risk factors for BCC include:  Age and sex: BCCs are particularly prevalent in elderly males. However, they also affect females and younger adults   Previous BCC or other form of skin cancer (squamous cell carcinoma, melanoma)   Sun damage (photoaging, actinic keratoses)   Repeated prior episodes of sunburn   Fair skin, blue eyes and blond or red hair--note; BCC can also affect darker skin types   Previous cutaneous injury, thermal burn, disease (eg cutaneous lupus, sebaceous naevus)   Inherited syndromes: BCC is a particular problem for families with basal cell naevus syndrome (Gorlin syndrome), Avxkf-Qnvlé-Xdntpwzm syndrome, Rombo syndrome, Oley syndrome and xeroderma pigmentosum   Other risk factors include ionising radiation, exposure to arsenic, and immune suppression due to disease or medicines    What causes basal cell carcinoma? The cause of BCC is multifactorial.  Most often, there are DNA mutations in the patched [facility]) tumour suppressor gene, part of hedgehog signaling pathway   These may be triggered by exposure to ultraviolet radiation   Various spontaneous and inherited gene defects predispose to [facility]    What are the clinical features of basal cell carcinoma? BCC is a locally invasive skin tumour. The main characteristics are:  Slowly growing plaque or nodule   Skin coloured, pink or pigmented   Varies in size from a few millimetres to several centimetres in diameter   Spontaneous bleeding or ulceration  BCC is very rarely a threat to life. A tiny proportion of BCCs grow rapidly, invade deeply, and/or metastasise to local lymph nodes.     Types of basal cell carcinoma  There are several distinct clinical types of BCC, and over 20 histological growth patterns of BCC. Nodular BCC  Most common type of facial BCC   Shiny or pearly nodule with a smooth surface   May have central depression or ulceration, so its edges appear rolled   Blood vessels cross its surface   Cystic variant is soft, with jelly-like contents   Micronodular, microcystic and infiltrative types are potentially aggressive subtypes   Also known as nodulocystic carcinoma  Superficial BCC  Most common type in younger adults   Most common type on upper trunk and shoulders   Slightly scaly, irregular plaque   Thin, translucent rolled border   Multiple microerosions  Morphoeaform BCC  Usually found in mid-facial sites   Waxy, scar-like plaque with indistinct borders   Wide and deep subclinical extension   May infiltrate cutaneous nerves (perineural spread)   Also known as morpheic, morphoeiform or sclerosing BCC  Basosquamous carcinoma  Mixed basal cell carcinoma (BCC) and squamous cell carcinoma (SCC)   Infiltrative growth pattern   Potentially more aggressive than other forms of BCC   Also known as basisquamous carcinoma and mixed basal-squamous cell carcinoma       Complications of basal cell carcinoma    Recurrent BCC  Recurrence of BCC after initial treatment is not uncommon. Characteristics of recurrent BCC often include: Incomplete excision or narrow margins at primary excision   Morphoeic, micronodular, and infiltrative subtypes   Location on head and neck    Advanced BCC  Advanced BCCs are large, often neglected tumours.   They may be several centimetres in diameter   They may be deeply infiltrating into tissues below the skin   They are difficult or impossible to treat surgically    Metastatic BCC  Very rare   Primary tumour is often large, neglected or recurrent, located on head and neck, with aggressive subtype   May have had multiple prior treatments   May arise in site exposed to ionising radiation   Can be fatal    How is basal cell carcinoma diagnosed? BCC is diagnosed clinically by the presence of a slowly enlarging skin lesion with typical appearance. The diagnosis and  by a diagnostic biopsy or following excision. Some typical superficial BCCs on trunk and limbs are clinically diagnosed and have non-surgical treatment without histology. What is the treatment for primary basal cell carcinoma? The treatment for a [address] [place] [address] depends on its type, size and location, the number to be treated, patient factors, and the preference or expertise of the doctor. Most BCCs are treated surgically. Long-term follow-up is recommended to check for new lesions and recurrence; the latter may be unnecessary if histology has reported wide clear margins. Excision biopsy  Excision means the lesion is cut out and the skin stitched up. Most appropriate treatment for nodular, infiltrative and morphoeic BCCs   Should include 3 to 5 mm margin of normal skin around the tumour   Very large lesions may require flap or skin graft to repair the defect   Pathologist will report deep and lateral margins   Further surgery is recommended for lesions that are incompletely excised    Mohs micrographically controlled excision  Mohs micrographically controlled surgery involves examining carefully marked excised tissue under the microscope, layer by layer, to ensure complete excision. Very high cure rates achieved by trained Mohs surgeons   Used in high-risk areas of the face around eyes, lips and nose   Suitable for ill-defined, morphoeic, infiltrative and recurrent subtypes   Large defects are repaired by flap or skin graft    Superficial skin surgery  Superficial skin surgery comprises shave, curettage, and electrocautery. It is a rapid technique using local anaesthesia and does not require sutures.   Suitable for small, well-defined nodular or superficial BCCs   Lesions are usually located on trunk or limbs   Wound is left open to heal by secondary intention   Moist wound dressings lead to healing within a few weeks   Eventual scar quality variable    Cryotherapy  Cryotherapy is the treatment of a superficial skin lesion by freezing it, usually with liquid nitrogen. Suitable for small superficial BCCs on covered areas of trunk and limbs   Best avoided for BCCs on head and neck, and distal to knees   Double freeze-thaw technique   Results in a blister that crusts over and heals within several weeks. Leaves permanent white mirela    Photodynamic therapy  Photodynamic therapy (PDT) refers to a technique in which [facility] is treated with a photosensitising chemical, and exposed to light several hours later. Topical photosensitisers include aminolevulinic acid lotion and methyl aminolevulinate cream   Suitable for low-risk small, superficial BCCs   Best avoided if tumour in site at high risk of recurrence   Results in inflammatory reaction, maximal 3-4 days after procedure   Treatment repeated 7 days after initial treatment   Excellent cosmetic results    Imiquimod cream  Imiquimod is an immune response modifier. Best used for superficial BCCs less than 2 cm diameter   Applied three to five times each week, for 6-16 weeks   Results in a variable inflammatory reaction, maximal at three weeks   Minimal scarring is usual    Fluorouracil cream  5-Fluorouracil cream is a topical cytotoxic agent. Used to treat small superficial basal cell carcinomas   Requires prolonged course, eg twice daily for 6-12 weeks   Causes inflammatory reaction   Has high recurrence rates    Radiotherapy  Radiotherapy or X-ray treatment can be used to treat primary BCCs or as adjunctive treatment if margins are incomplete.   Mainly used if surgery is not suitable   Best avoided in young patients and in genetic conditions predisposing to skin cancer   Best cosmetic results achieved using multiple fractions   Typically, patient attends once-weekly for several weeks   Causes inflammatory reaction followed by scar   Risk of radiodermatitis, late recurrence, and new tumours    What is the treatment for advanced or metastatic basal cell carcinoma? Locally advanced primary, recurrent or metastatic BCC requires multidisciplinary consultation. Often a combination of treatments is used. Surgery   Radiotherapy   Targeted therapy  Targeted therapy refers to the hedgehog signalling pathway inhibitors, vismodegib and sonidegib. These drugs have some important risks and side effects. How can basal cell carcinoma be prevented? The most important way to prevent BCC is to avoid sunburn. This is especially important in childhood and early life. Fair skinned individuals and those with a personal or family history of BCC should protect their skin from sun exposure daily, year-round and lifelong. Stay indoors or under the shade in the middle of the day   Wear covering clothing   Apply high protection factor SPF50+ broad-spectrum sunscreens generously to exposed skin if outdoors   Avoid indoor tanning (sun beds, solaria)  Oral nicotinamide (vitamin B3) in a dose of 500 mg twice daily may reduce the number and severity of BCCs. What is the outlook for basal cell carcinoma? Most BCCs are cured by treatment. Cure is most likely if treatment is undertaken when the lesion is small. About 50% of people with BCC develop a second one within 3 years of the first. They are also at increased risk of other skin cancers, especially melanoma. Regular self-skin examinations and long-term annual skin checks by an experienced health professional are recommended. SQUAMOUS CELL CARCINOMA    What is cutaneous squamous cell carcinoma? Cutaneous squamous cell carcinoma (SCC) is a common type of keratinocyte or non-melanoma skin cancer. It is derived from cells within the epidermis that make keratin -- the horny protein that makes up skin, hair and nails. Cutaneous SCC is an invasive disease, referring to cancer cells that have grown beyond the epidermis. SCC can sometimes metastasise and may prove fatal.  Intraepidermal carcinoma (cutaneous SCC in situ) and mucosal SCC are considered elsewhere. Who gets cutaneous squamous cell carcinoma? Risk factors for cutaneous SCC include:  Age and sex: SCCs are particularly prevalent in elderly males. However, they also affect females and younger adults. Previous SCC or another form of skin cancer (basal cell carcinoma, melanoma) are a strong predictor for further skin cancers. Actinic keratoses   Outdoor occupation or recreation   Smoking   Fair skin, blue eyes and blond or red hair   Previous cutaneous injury, thermal burn, disease (eg cutaneous lupus, epidermolysis bullosa, leg ulcer)   Inherited syndromes: SCC is a particular problem for families with xeroderma pigmentosum and albinism   Other risk factors include ionising radiation, exposure to arsenic, and immune suppression due to disease (eg chronic lymphocytic leukaemia) or medicines. Organ transplant recipients have a massively increased risk of developing SCC. What causes cutaneous squamous cell carcinoma? More than 90% of cases of SCC are associated with numerous DNA mutations in multiple somatic genes. Mutations in the p53 tumour suppressor gene are caused by exposure to ultraviolet radiation (UV), especially UVB (known as signature 7). Other signature mutations relate to cigarette smoking, ageing and immune suppression (eg, to drugs such as azathioprine). Mutations in signalling pathways affect the epidermal growth factor receptor, EFREN, Fyn, and [id number] signalling. Beta-genus human papillomaviruses (wart virus) are thought to play a role in SCC arising in immune-suppressed populations. ?-HPV and HPV subtypes 5, 8, 17, 20, 24, and 38 have also been associated with an increased risk of cutaneous SCC in immunocompetent individuals. What are the clinical features of cutaneous squamous cell carcinoma?   Cutaneous SCCs present as enlarging scaly or crusted lumps. They usually arise within pre-existing actinic keratosis or intraepidermal carcinoma. They grow over weeks to months   They may ulcerate   They are often tender or painful   Located on sun-exposed sites, particularly the face, lips, ears, hands, forearms and lower legs   Size varies from a few millimetres to several centimetres in diameter. Types of cutaneous squamous cell carcinoma  Distinct clinical types of invasive cutaneous SCC include:  Cutaneous horn -- the horn is due to excessive production of keratin   Keratoacanthoma (KA) -- a rapidly growing keratinising nodule that may resolve without treatment   Carcinoma cuniculatum (‘verrucous carcinoma’), a slow-growing, warty tumour on the sole of the foot. Multiple eruptive SCC/KA-like lesions arising in syndromes, such as multiple self-healing squamous epitheliomas of Novak-Smith and Grzybowski syndrome  The pathologist may classify a tumour as well differentiated, moderately well differentiated, poorly differentiated or anaplastic cutaneous SCC. There are other variants. Classification of squamous cell carcinoma by risk  Cutaneous SCC is classified as low-risk or high-risk, depending on the chance of tumour recurrence and metastasis. Characteristics of high-risk SCC include:  High-risk cutaneous squamous cell carcinoma has the following characteristics:  Diameter greater than or equal to 2 cm   Location on the ear, vermilion of the lip, central face, hands, feet, genitalia   Arising in elderly or immune suppressed patient   Histological thickness greater than 2 mm, poorly differentiated histology, or with the invasion of the subcutaneous tissue, nerves and blood vessels  Metastatic SCC is found in regional lymph nodes (80%), lungs, liver, brain, bones and skin.     Staging cutaneous squamous cell carcinoma  In 2011, the [address] on Cancer (AJCC) published a new staging systemic for cutaneous SCC for the 7th Edition of the AJCC manual. This evaluates the dimensions of the original primary tumour (T) and its metastases to lymph nodes (N). Tumour staging for cutaneous SCC  TX: Th Primary tumour cannot be assessed  T0: No evidence of a primary tumour  Tis: Carcinoma in situ  T1: Tumour ? 2cm without high-risk features  T2: Tumour ? 2cm; or; Tumour ? 2 cm with high-risk features  T3: Tumour with the invasion of maxilla, mandible, orbit or temporal bone  T4: Tumour with the invasion of axial or appendicular skeleton or perineural invasion of skull base    Jovan staging for cutaneous SCC  NX: Regional lymph nodes cannot be assessed  N0: No regional lymph node metastasis  N1: Metastasis in one local lymph node ? 3cm  N2: Metastasis in one local lymph node ? 3cm; or; Metastasis in >1 local lymph node ? 6cm  N3: Metastasis in lymph node ? 6cm    How is squamous cell carcinoma diagnosed? Diagnosis of cutaneous SCC is based on clinical features. The diagnosis and histological subtype are confirmed pathologically by diagnostic biopsy or following excision. See squamous cell carcinoma - pathology. Patients with high-risk SCC may also undergo staging investigations to determine whether it has spread to lymph nodes or elsewhere. These may include:  Imaging using ultrasound scan, X-rays, CT scans, MRI scans   Lymph node or other tissue biopsies    What is the treatment for cutaneous squamous cell carcinoma? Cutaneous SCC is nearly always treated surgically. Most cases are excised with a 3-10 mm margin of normal tissue around a visible tumour. A flap or skin graft may be needed to repair the defect.   Other methods of removal include:  Shave, curettage, and electrocautery for low-risk tumours on trunk and limbs   Aggressive cryotherapy for very small, thin, low-risk tumours   Mohs micrographic surgery for large facial lesions with indistinct margins or recurrent tumours   Radiotherapy for an inoperable tumour, patients unsuitable for surgery, or as adjuvant    What is the treatment for advanced or metastatic squamous cell carcinoma? Locally advanced primary, recurrent or metastatic SCC requires multidisciplinary consultation. Often a combination of treatments is used. Surgery   Radiotherapy   Cemiplimab   Experimental targeted therapy using epidermal growth factor receptor inhibitors    How can cutaneous squamous cell carcinoma be prevented? There is a great deal of evidence to show that very careful sun protection at any time of life reduces the number of SCCs. This is particularly important in ageing, sun-damaged, fair skin; in patients that are immune suppressed; and in those who already have actinic keratoses or previous SCC. Stay indoors or under the shade in the middle of the day   Wear covering clothing   Apply high protection factor SPF50+ broad-spectrum sunscreens generously to exposed skin if outdoors   Avoid indoor tanning (sun beds, solaria)    Oral nicotinamide (vitamin B3) in a dose of 500 mg twice daily may reduce the number and severity of SCCs in people at high risk. Patients with multiple squamous cell carcinomas may be prescribed an oral retinoid (acitretin or isotretinoin). These reduce the number of tumours but have some nuisance side effects. What is the outlook for cutaneous squamous cell carcinoma? Most SCCs are cured by treatment. A cure is most likely if treatment is undertaken when the lesion is small. The risk of recurrence or disease-associated death is greater for tumours that are > 20 mm in diameter and/or > 2 mm in thickness at the time of surgical excision. About 50% of people at high risk of SCC develop a second one within 5 years of the first. They are also at increased risk of other skin cancers, especially melanoma. Regular self-skin examinations and long-term annual skin checks by an experienced health professional are recommended.      MELANOCYTIC NEVI ("Moles")    Melanocytic nevi ("moles") are tan or brown, raised or flat areas of the skin which have an increased number of melanocytes. Melanocytes are the cells in our body which make pigment and account for skin color. Some moles are present at birth (I.e., "congenital nevi"), while others come up later in life (i.e., "acquired nevi"). The sun can stimulate the body to make more moles. Sunburns are not the only thing that triggers more moles. Chronic sun exposure can do it too. Clinically distinguishing a healthy mole from melanoma may be difficult, even for experienced dermatologists. The "ABCDE's" of moles have been suggested as a means of helping to alert a person to a suspicious mole and the possible increased risk of melanoma. The suggestions for raising alert are as follows:    Asymmetry: Healthy moles tend to be symmetric, while melanomas are often asymmetric. Asymmetry means if you draw a line through the mole, the two halves do not match in color, size, shape, or surface texture. Asymmetry can be a result of rapid enlargement of a mole, the development of a raised area on a previously flat lesion, scaling, ulceration, bleeding or scabbing within the mole. Any mole that starts to demonstrate "asymmetry" should be examined promptly by a board certified dermatologist.     Border: Healthy moles tend to have discrete, even borders. The border of a melanoma often blends into the normal skin and does not sharply delineate the mole from normal skin. Any mole that starts to demonstrate "uneven borders" should be examined promptly by a board certified dermatologist.     Color: Healthy moles tend to be one color throughout. Melanomas tend to be made up of different colors ranging from dark black, blue, white, or red. Any mole that demonstrates a color change should be examined promptly by a board certified dermatologist.     Diameter: Healthy moles tend to be smaller than 0.6 cm in size; an exception are "congenital nevi" that can be larger. Melanomas tend to grow and can often be greater than 0.6 cm (1/4 of an inch, or the size of a pencil eraser). This is only a guideline, and many normal moles may be larger than 0.6 cm without being unhealthy. Any mole that starts to change in size (small to bigger or bigger to smaller) should be examined promptly by a board certified dermatologist.     Evolving: Healthy moles tend to "stay the same."  Melanomas may often show signs of change or evolution such as a change in size, shape, color, or elevation. Any mole that starts to itch, bleed, crust, burn, hurt, or ulcerate or demonstrate a change or evolution should be examined promptly by a board certified dermatologist.      Dysplastic Nevi  Dysplastic moles are moles that fit the ABCDE rules of melanoma but are not identified as melanomas when examined under the microscope. They may indicate an increased risk of melanoma in that person. If there is a family history of melanoma, most experts agree that the person may be at an increased risk for developing a melanoma. Experts still do not agree on what dysplastic moles mean in patients without a personal or family history of melanoma. Dysplastic moles are usually larger than common moles and have different colors within it with irregular borders. The appearance can be very similar to a melanoma. Biopsies of dysplastic moles may show abnormalities which are different from a regular mole. Melanoma  Malignant melanoma is a type of skin cancer that can be deadly if it spreads throughout the body. The incidence of melanoma in the [facility] is growing faster than any other cancer. Melanoma usually grows near the surface of the skin for a period of time, and then begins to grow deeper into the skin. Once it grows deeper into the skin, the risk of spread to other organs greatly increases.  Therefore, early detection and removal of a malignant melanoma may result in a better chance at a complete cure; removal after the tumor has spread may not be as effective, leading to worse clinical outcomes such as death. The true rate of nevus transformation into a melanoma is unknown. It has been estimated that the lifetime risk for any acquired melanocytic nevus on any 21year-old individual transforming into melanoma by age 80 is 0.03% (1 in 3,164) for men and 0.009% (1 in 10,800) for women. The appearance of a "new mole" remains one of the most reliable methods for identifying a malignant melanoma. Occasionally, melanomas appear as rapidly growing, blue-black, dome-shaped bumps within a previous mole or previous area of normal skin. Other times, melanomas are suspected when a mole suddenly appears or changes. Itching, burning, or pain in a pigmented lesion should increase suspicion, but most patients with early melanoma have no skin discomfort whatsoever. Melanoma can occur anywhere on the skin, including areas that are difficult for self-examination. Many melanomas are first noticed by other family members. Suspicious-looking moles may be removed for microscopic examination. You may be able to prevent death from melanoma by doing two simple things:    Try to avoid unnecessary sun exposure and protect your skin when it is exposed to the sun. People who live near the equator, people who have intermittent exposures to large amounts of sun, and people who have had sunburns in childhood or adolescence have an increased risk for melanoma. Sun sense and vigilant sun protection may be keys to helping to prevent melanoma. We recommend wearing UPF-rated sun protective clothing and sunglasses whenever possible and applying a moisturizer-sunscreen combination product (SPF 50+) such as Neutrogena Daily Defense to sun exposed areas of skin at least three times a day. Have your moles regularly examined by a board certified dermatologist AND by yourself or a family member/friend at home.   We recommend that you have your moles examined at least once a year by a board certified dermatologist.  Use your birthday as an annual reminder to have your "Birthday Suit" (I.e., your skin) examined; it is a nice birthday gift to yourself to know that your skin is healthy appearing! Additionally, at-home self examinations may be helpful for detecting a possible melanoma. Use the ABCDEs we discussed and check your moles once a month at home. SEBORRHEIC KERATOSIS  A seborrheic keratosis is a harmless warty spot that appears during adult life as a common sign of skin aging. Seborrheic keratoses can arise on any area of skin, covered or uncovered, with the usual exception of the palms and soles. They do not arise from mucous membranes. Seborrheic keratoses can have highly variable appearance. Seborrheic keratoses are extremely common. It has been estimated that over 90% of adults over the age of 61 years have one or more of them. They occur in males and females of all races, typically beginning to erupt in the 35s or 45s. They are uncommon under the age of 21 years. The precise cause of seborrhoeic keratoses is not known. Seborrhoeic keratoses are considered degenerative in nature. As time goes by, seborrheic keratoses tend to become more numerous. Some people inherit a tendency to develop a very large number of them; some people may have hundreds of them. The name "seborrheic keratosis" is misleading, because these lesions are not limited to a seborrhoeic distribution (scalp, mid-face, chest, upper back), nor are they formed from sebaceous glands, nor are they associated with sebum -- which is greasy. Seborrheic keratosis may also be called "SK," "Seb K," "basal cell papilloma," "senile wart," or "barnacle."      There is no easy way to remove multiple lesions on a single occasion.   Unless a specific lesion is "inflamed" and is causing pain or stinging/burning or is bleeding, most insurance companies do not authorize treatment. ANGIOMA ("CHERRY ANGIOMA")  Tsai angiomas markedly increase in number from about the age of 36, so it has been estimated that 75% of people over 76years of age have them. Although they also called "senile angiomas," they can occur in young people too - 5% of adolescents have been found to have them. Cherry angiomas are very common in males and females of any age or race, with no difference in sexes or races affected. They are however more noticeable in white skin than in skin of colour. There may be a family history of similar lesions. Eruptive (very large number appearing in a short period of time) cherry angiomas have been rarely reported to be associated with internal malignancy and pregnancy.

## 2023-08-24 NOTE — PROGRESS NOTES
[facility] Note     Patient Name: Alee Li  Encounter Date: 8/24/2023    Have you been cared for by a [facility] D[facility]atologist in the last 3 years and, if so, which description applies to you? Yes. I have been here within the last 3 years, and my medical history has NOT changed since that time. I am FEMALE/of child-bearing potential.    REVIEW OF SYSTEMS:  Have you recently had or currently have any of the following? · No changes in my recent health. PAST MEDICAL HISTORY:  Have you personally ever had or currently have any of the following? If "YES," then please provide more detail. · No changes in my medical history. FAMILY HISTORY:  Any "first degree relatives" (parent, brother, sister, or child) with the following? • No changes in my family's known health. PATIENT EXPERIENCE:    • Do you want the Dermatologist to perform a COMPLETE skin exam today including a clinical examination under the "bra and underwear" areas? Yes  • If necessary, do we have your permission to call and leave a detailed message on your Preferred Phone number that includes your specific medical information? Yes      No Known Allergies   Current Outpatient Medications:   •  acetaminophen (TYLENOL) 500 mg tablet, Take 1,000 mg by mouth every 6 (six) hours as needed for mild pain (Patient not taking: No sig reported), Disp: , Rfl:   •  Ascorbic Acid (VITAMIN C) 100 MG tablet, Take 100 mg by mouth daily (Patient not taking: No sig reported), Disp: , Rfl:   •  b complex vitamins tablet, Take 1 tablet by mouth daily, Disp: , Rfl:   •  cholecalciferol (VITAMIN D3) 400 units tablet, Take 400 Units by mouth daily, Disp: , Rfl:   •  diclofenac sodium (VOLTAREN) 50 mg EC tablet, Take 50 mg by mouth 3 (three) times a day as needed, Disp: , Rfl: 0  •  fluocinonide (LIDEX) 0.05 % cream, Apply topically 2 (two) times a day To the small rash on your arms and back. DO NOT apply to the large wound on your back. (Patient not taking: Reported on 1/19/2021), Disp: 30 g, Rfl: 0  •  glucosamine-chondroitin 500-400 MG tablet, Take 1 tablet by mouth 2 (two) times a day , Disp: , Rfl:   •  Multiple Vitamins-Minerals (MULTIVITAMIN WITH MINERALS) tablet, Take 1 tablet by mouth daily, Disp: , Rfl:   •  vitamin E 100 UNIT capsule, Take 100 Units by mouth daily, Disp: , Rfl:           • Whom besides the patient is providing clinical information about today's encounter?   o NO ADDITIONAL HISTORIAN (patient alone provided history)     72year old female with history of skin cancer presents for a yearly skin exam. Patient has no concerns. Physical Exam and Assessment/Plan by Diagnosis:    HISTORY OF BASAL CELL CARCINOMA    Physical Exam:  • Anatomic Location Affected:  Left posterior neck in 2020, right back in 2019  • Morphological Description of scar:  Scar well healed  • Suspected Recurrence: No      Additional History of Present Condition:  History of basal cell carcinoma with no sign of recurrence    Assessment and Plan:  Based on a thorough discussion of this condition and the management approach to it (including a comprehensive discussion of the known risks, side effects and potential benefits of treatment), the patient (family) agrees to implement the following specific plan:  • Recommend routine skin exams every year  • Sun avoidance, protective clothing (known as UPF clothing), and the use of at least SPF 30 sunscreens is advised. Sunscreen should be reapplied every two hours when outside.    •     HISTORY OF SQUAMOUS CELL CARCINOMA     Physical Exam:  • Anatomic Location Affected:  Back in 2019  • Morphological Description of Scar:  Scar well healed  • Suspected Recurrence: no  • Regional adenopathy: no    Additional History of Present Condition:  History of squamous cell carcinoma with no sign of recurrence      Assessment and Plan:  Based on a thorough discussion of this condition and the management approach to it (including a comprehensive discussion of the known risks, side effects and potential benefits of treatment), the patient (family) agrees to implement the following specific plan:  • Recommend routine skin exams every year   • Sun avoidance, protective clothing (known as UPF clothing), and the use of at least SPF 30 sunscreens is advised. Sunscreen should be reapplied every two hours when outside. MELANOCYTIC NEVI ("Moles")    Physical Exam:  • Anatomic Location Affected: Mostly on sun-exposed areas of the head, face, neck and arms. • Morphological Description:  Scattered, 1-4mm round to ovoid, symmetrical-appearing, even bordered, skin colored to dark brown macules/papules, mostly in sun-exposed areas    Additional History of Present Condition:  Present on exam.     Assessment and Plan:  Based on a thorough discussion of this condition and the management approach to it (including a comprehensive discussion of the known risks, side effects and potential benefits of treatment), the patient (family) agrees to implement the following specific plan:  • Provided handout with information regarding the ABCDE's of moles   • Recommend routine skin exams every year   • Sun avoidance, protective clothing (known as UPF clothing), and the use of at least SPF 30 sunscreens is advised. Sunscreen should be reapplied every two hours when outside. SEBORRHEIC KERATOSIS; NON-INFLAMED    Physical Exam:  • Anatomic Location Affected:  scattered across trunk, extremities,  face  • Morphological Description:  Flat and raised, waxy, smooth to warty textured, yellow to brownish-grey to dark brown to blackish, discrete, "stuck-on" appearing papules. •     Additional History of Present Condition:  Patient reports new bumps on the skin. Denies itch, burn, pain, bleeding or ulceration. Present constantly; nothing seems to make it worse or better. No prior treatment.       Assessment and Plan:  Based on a thorough discussion of this condition and the management approach to it (including a comprehensive discussion of the known risks, side effects and potential benefits of treatment), the patient (family) agrees to implement the following specific plan:  • Reassured benign        ANGIOMA ("CHERRY ANGIOMA")    Physical Exam:  • Anatomic Location: scattered across sun exposed areas of the trunk and extremities   • Morphologic Description: Firm red to reddish-blue discrete papules    Additional History of Present Condition:  Present on exam.     Assessment and Plan:  • Reassured benign        Scribe Attestation    I,:  Renan Wilkerson am acting as a scribe while in the presence of the attending physician.:       I,:  Lobo Strong MD personally performed the services described in this documentation    as scribed in my presence.:

## 2023-10-12 ENCOUNTER — APPOINTMENT (EMERGENCY)
Dept: RADIOLOGY | Facility: HOSPITAL | Age: 73
End: 2023-10-12
Payer: COMMERCIAL

## 2023-10-12 ENCOUNTER — HOSPITAL ENCOUNTER (EMERGENCY)
Facility: HOSPITAL | Age: 73
Discharge: HOME | End: 2023-10-12
Attending: EMERGENCY MEDICINE
Payer: COMMERCIAL

## 2023-10-12 VITALS
HEART RATE: 69 BPM | OXYGEN SATURATION: 97 % | RESPIRATION RATE: 22 BRPM | TEMPERATURE: 97.6 F | DIASTOLIC BLOOD PRESSURE: 80 MMHG | SYSTOLIC BLOOD PRESSURE: 202 MMHG

## 2023-10-12 DIAGNOSIS — S43.004A DISLOCATION OF RIGHT SHOULDER JOINT, INITIAL ENCOUNTER: Primary | ICD-10-CM

## 2023-10-12 DIAGNOSIS — S42.309A HUMERUS FRACTURE: ICD-10-CM

## 2023-10-12 PROCEDURE — 96374 THER/PROPH/DIAG INJ IV PUSH: CPT

## 2023-10-12 PROCEDURE — 73030 X-RAY EXAM OF SHOULDER: CPT

## 2023-10-12 PROCEDURE — 99285 EMERGENCY DEPT VISIT HI MDM: CPT | Performed by: EMERGENCY MEDICINE

## 2023-10-12 PROCEDURE — 99284 EMERGENCY DEPT VISIT MOD MDM: CPT

## 2023-10-12 PROCEDURE — 96372 THER/PROPH/DIAG INJ SC/IM: CPT

## 2023-10-12 PROCEDURE — 23665 CLTX SHO DSLC FX GR HMRL TBR: CPT | Performed by: EMERGENCY MEDICINE

## 2023-10-12 PROCEDURE — 73020 X-RAY EXAM OF SHOULDER: CPT

## 2023-10-12 PROCEDURE — 96375 TX/PRO/DX INJ NEW DRUG ADDON: CPT

## 2023-10-12 RX ORDER — MORPHINE SULFATE 10 MG/ML
6 INJECTION, SOLUTION INTRAMUSCULAR; INTRAVENOUS ONCE
Status: COMPLETED | OUTPATIENT
Start: 2023-10-12 | End: 2023-10-12

## 2023-10-12 RX ORDER — ETOMIDATE 2 MG/ML
20 INJECTION INTRAVENOUS ONCE
Status: COMPLETED | OUTPATIENT
Start: 2023-10-12 | End: 2023-10-12

## 2023-10-12 RX ORDER — OXYCODONE HYDROCHLORIDE 5 MG/1
5 TABLET ORAL EVERY 4 HOURS PRN
Qty: 20 TABLET | Refills: 0 | Status: SHIPPED | OUTPATIENT
Start: 2023-10-12 | End: 2023-10-20 | Stop reason: SDUPTHER

## 2023-10-12 RX ORDER — ONDANSETRON 4 MG/1
4 TABLET, ORALLY DISINTEGRATING ORAL ONCE
Status: COMPLETED | OUTPATIENT
Start: 2023-10-12 | End: 2023-10-12

## 2023-10-12 RX ADMIN — MORPHINE SULFATE 6 MG: 10 INJECTION, SOLUTION INTRAMUSCULAR; INTRAVENOUS at 18:46

## 2023-10-12 RX ADMIN — ONDANSETRON 4 MG: 4 TABLET, ORALLY DISINTEGRATING ORAL at 17:29

## 2023-10-12 RX ADMIN — MORPHINE SULFATE 6 MG: 10 INJECTION, SOLUTION INTRAMUSCULAR; INTRAVENOUS at 17:29

## 2023-10-12 RX ADMIN — ETOMIDATE 10 MG: 2 INJECTION INTRAVENOUS at 18:17

## 2023-10-12 NOTE — ED PROVIDER NOTES
History  Chief Complaint   Patient presents with    Fall     Pt BIB EMS pt states she fell in her kitchen and hurt her R arm/shoulder, denies LOC and blood thinners, denies hitting head. Fall from standing, landed on R shoulder with immediate onset severe R shoulder pain worse with movement and incompletely alleviated w rest. No head injury, headache, LOC or neurologic sxs. No neck pain. No cp or sob. No other sxs or concerns. Additional history from  at bedside. Prior to Admission Medications   Prescriptions Last Dose Informant Patient Reported? Taking? Ascorbic Acid (VITAMIN C) 100 MG tablet  Self Yes No   Sig: Take 100 mg by mouth daily   Multiple Vitamins-Minerals (MULTIVITAMIN WITH MINERALS) tablet  Self Yes No   Sig: Take 1 tablet by mouth daily   acetaminophen (TYLENOL) 500 mg tablet  Self Yes No   Sig: Take 1,000 mg by mouth every 6 (six) hours as needed for mild pain   b complex vitamins tablet  Self Yes No   Sig: Take 1 tablet by mouth daily   cholecalciferol (VITAMIN D3) 400 units tablet  Self Yes No   Sig: Take 400 Units by mouth daily   diclofenac sodium (VOLTAREN) 50 mg EC tablet  Self Yes No   Sig: Take 50 mg by mouth 3 (three) times a day as needed   fluocinonide (LIDEX) 0.05 % cream  Self No No   Sig: Apply topically 2 (two) times a day To the small rash on your arms and back. DO NOT apply to the large wound on your back.    Patient not taking: Reported on 2021   glucosamine-chondroitin 500-400 MG tablet  Self Yes No   Sig: Take 1 tablet by mouth 2 (two) times a day    vitamin E 100 UNIT capsule  Self Yes No   Sig: Take 100 Units by mouth daily      Facility-Administered Medications: None       Past Medical History:   Diagnosis Date    Seasonal allergies        Past Surgical History:   Procedure Laterality Date     SECTION      COMPLEX WOUND CLOSURE TO EXTREMITY Bilateral 2019    Procedure: COMPLEX WOUND CLOSURE, RIGHT BACK;  Surgeon: Laureano Cordova MD;  Location: [facility];  Service: Plastics    FULL THICKNESS SKIN GRAFT N/A 8/22/2019    Procedure: SKIN GRAFT FULL THICKNESS  (FTSG) EXTREMITY;  Surgeon: Adrian Hogan MD;  Location: [facility];  Service: Plastics    MASS EXCISION N/A 7/17/2019    Procedure: EXCISION  BIOPSY LESION/MASS BACK;  Surgeon: Elo Haddad MD;  Location: [facility];  Service: General    SKIN LESION EXCISION Bilateral 8/22/2019    Procedure: EXCISION WIDE LESION RIGHT BACK;  Surgeon: Jessica Steven MD;  Location: [facility];  Service: Surgical Oncology       Family History   Problem Relation Age of Onset    Heart disease Mother     Bone cancer Father     Cancer Brother         neuroblastoma     I have reviewed and agree with the history as documented. E-Cigarette/Vaping     E-Cigarette/Vaping Substances     Social History     Tobacco Use    Smoking status: Former     Packs/day: 0.00     Types: Cigarettes    Smokeless tobacco: Never    Tobacco comments:     no cig use as of 8/12/19 , using rare CBD oil vapor   Substance Use Topics    Alcohol use: Not Currently    Drug use: Not Currently       Review of Systems   Musculoskeletal:  Positive for arthralgias. All other systems reviewed and are negative. Physical Exam  Physical Exam  Vitals and nursing note reviewed. Constitutional:       General: She is not in acute distress. Appearance: Normal appearance. She is well-developed. She is not ill-appearing, toxic-appearing or diaphoretic. HENT:      Head: Normocephalic and atraumatic. Eyes:      General:         Right eye: No discharge. Left eye: No discharge. Conjunctiva/sclera: Conjunctivae normal.      Pupils: Pupils are equal, round, and reactive to light. Neck:      Vascular: No JVD. Cardiovascular:      Rate and Rhythm: Normal rate. Pulses: Normal pulses. Pulmonary:      Effort: No respiratory distress. Breath sounds: No stridor. No wheezing or rhonchi. Abdominal:      General: Abdomen is flat. Tenderness: There is no abdominal tenderness. There is no guarding or rebound. Hernia: No hernia is present. Musculoskeletal:         General: Swelling, tenderness and deformity present. Cervical back: Normal range of motion and neck supple. No rigidity. Skin:     General: Skin is warm and dry. Capillary Refill: Capillary refill takes less than 2 seconds. Coloration: Skin is not jaundiced or pale. Findings: No bruising or erythema. Neurological:      General: No focal deficit present. Mental Status: She is alert and oriented to person, place, and time. Cranial Nerves: No cranial nerve deficit. Sensory: No sensory deficit. Motor: No weakness or abnormal muscle tone.       Coordination: Coordination normal.         Vital Signs  ED Triage Vitals   Temperature Pulse Respirations Blood Pressure SpO2   10/12/23 1723 10/12/23 1723 10/12/23 1723 10/12/23 1723 10/12/23 1723   97.6 °F (36.4 °C) 76 16 (!) 231/105 96 %      Temp Source Heart Rate Source Patient Position - Orthostatic VS BP Location FiO2 (%)   10/12/23 1723 10/12/23 1845 10/12/23 1723 10/12/23 1723 --   Oral Monitor Lying Left arm       Pain Score       10/12/23 1723       10 - Worst Possible Pain           Vitals:    10/12/23 1827 10/12/23 1830 10/12/23 1833 10/12/23 1845   BP:  (!) 217/104  (!) 202/80   Pulse: 74 72 73 69   Patient Position - Orthostatic VS:    Lying         Visual Acuity  Visual Acuity      Flowsheet Row Most Recent Value   L Pupil Size (mm) 3   R Pupil Size (mm) 3            ED Medications  Medications   morphine injection 6 mg (6 mg Intramuscular Given 10/12/23 1729)   ondansetron (ZOFRAN-ODT) dispersible tablet 4 mg (4 mg Oral Given 10/12/23 1729)   etomidate (AMIDATE) 2 mg/mL injection 20 mg (10 mg Intravenous Given 10/12/23 1817)   morphine injection 6 mg (6 mg Intravenous Given 10/12/23 1846)       Diagnostic Studies  Results Reviewed       None                   XR shoulder 1 vw RIGHT POST REDUCTION   ED Interpretation by Adria Campos MD (10/12 3280)   Satisfactory alignment of R shoulder. Persistent fx noted. XR shoulder 2+ views RIGHT   ED Interpretation by Adria Campos MD (10/12 8885)   Fx dislocation R shoulder. Procedures  Pre-Procedural Sedation    Performed by: Adria Campos MD  Authorized by: Adria Campos MD    Consent:     Consent obtained:  Verbal    Consent given by:  Patient    Risks discussed:   Allergic reaction, prolonged hypoxia resulting in organ damage, dysrhythmia, inadequate sedation, respiratory compromise necessitating ventilatory assistance and intubation, nausea and vomiting    Alternatives discussed:  Analgesia without sedation, anxiolysis and regional anesthesia  [place] protocol:     Patient identity confirmation method:  Verbally with patient  Indications:     Sedation purpose:  Dislocation reduction    Procedure necessitating sedation performed by:  Physician performing sedation    Intended level of sedation:  Moderate (conscious sedation)  Pre-sedation assessment:     NPO status caution: unable to specify NPO status      Neck mobility: normal      Mouth opening:  3 or more finger widths    Mallampati score:  I - soft palate, uvula, fauces, pillars visible    Pre-sedation assessments completed and reviewed: airway patency, cardiovascular function, hydration status, mental status, nausea/vomiting, pain level and respiratory function      History of difficult intubation: no      Pre-sedation assessment completed:  10/12/2023 5:50 PM  Procedural Sedation    Date/Time: 10/12/2023 6:07 PM    Performed by: Adria Campos MD  Authorized by: Adria Campos MD    Immediate pre-procedure details:     Reassessment: Patient reassessed immediately prior to procedure      Reviewed: vital signs      Verified: bag valve mask available, emergency equipment available, intubation equipment available, IV patency confirmed, oxygen available and suction available    Procedure details (see MAR for exact dosages):     Sedation start time:  10/12/2023 6:07 PM    Preoxygenation:  Nasal cannula    Sedation:  Etomidate    Intra-procedure events: none      Sedation end time:  10/12/2023 6:13 PM    Total sedation time (minutes):  6  Post-procedure details:     Post-sedation assessment completed:  10/12/2023 6:27 PM    Attendance: Constant attendance by certified staff until patient recovered      Recovery: Patient returned to pre-procedure baseline      Post-sedation assessments completed and reviewed: airway patency, cardiovascular function, mental status, nausea/vomiting and respiratory function      Patient is stable for discharge or admission: yes      Patient tolerance: Tolerated well, no immediate complications  Orthopedic injury treatment    Date/Time: 10/12/2023 6:28 PM    Performed by: Marvin Diallo MD  Authorized by: Marvin Diallo MD    Patient Location:  ED  [facility] Protocol:  Consent: Verbal consent obtained.   Risks and benefits: risks, benefits and alternatives were discussed  Consent given by: patient    Injury location:  Shoulder  Location details:  Right shoulder  Injury type:  Dislocation  Dislocation type: anterior    Chronicity:  New  Neurovascular status: Neurovascularly intact    Distal perfusion: normal    Neurological function: normal    Range of motion: reduced    Manipulation performed?: Yes    Reduction method:  External rotation  Reduction method:  External rotation  Reduction method:  External rotation  Reduction method:  External rotation  Reduction method:  External rotation  Reduction method:  External rotation  Reduction successful?: Yes    Confirmation: Reduction confirmed by x-ray    Immobilization:  Sling  Neurovascular status: Neurovascularly intact    Distal perfusion: normal    Neurological function: normal    Range of motion: improved    Patient tolerance:  Patient tolerated the procedure well with no immediate complications    Conscious Sedation Assessment      Flowsheet Row Classification Score   ASA Scale Assessment 1-Healthy patient, no disease outside surgical process filed at 10/12/2023 1806               ED Course  ED Course as of 10/12/23 1900   Thu Oct 12, 2023   1851 I have reasonably determined that electronically prescribing a controlled substance would be impractical for the patient to obtain the controlled substance prescribed by electronic prescription or would cause an untimely delay resulting in an adverse impact on the patient's medical condition. Identification of Seniors at [address] [place] [address] Drive Most Recent Value   (ISAR) Identification of Seniors at Risk    Before the illness or injury that brought you to the Emergency, did you need someone to help you on a regular basis? 0 Filed at: 10/12/2023 1727   In the last 24 hours, have you needed more help than usual? 0 Filed at: 10/12/2023 1727   Have you been hospitalized for one or more nights during the past 6 months? 0 Filed at: 10/12/2023 1727   In general, do you see well? 0 Filed at: 10/12/2023 1727   In general, do you have serious problems with your memory? 1 Filed at: 10/12/2023 1727   Do you take more than three different medications every day? 0 Filed at: 10/12/2023 1727   ISAR Score 1 Filed at: 10/12/2023 1727                        SBIRT 20yo+      Flowsheet Row Most Recent Value   Initial Alcohol Screen: US AUDIT-C     1. How often do you have a drink containing alcohol? 0 Filed at: 10/12/2023 1728   2. How many drinks containing alcohol do you have on a typical day you are drinking? 0 Filed at: 10/12/2023 1728   3b. FEMALE Any Age, or MALE 65+: How often do you have 4 or more drinks on one occassion? 0 Filed at: 10/12/2023 1728   Audit-C Score 0 Filed at: 10/12/2023 1728   AVERY: How many times in the past year have you. .. Used an illegal drug or used a prescription medication for non-medical reasons?  Never Filed at: 10/12/2023 1728                      Medical Decision Making  Problems Addressed:  Dislocation of right shoulder joint, initial encounter: complicated acute illness or injury     Details: reduced by me, see procedure note. sling applied. f/u ortho. prn pain control. Humerus fracture: complicated acute illness or injury    Amount and/or Complexity of Data Reviewed  Radiology: ordered and independent interpretation performed. Risk  Prescription drug management. Disposition  Final diagnoses:   Dislocation of right shoulder joint, initial encounter   Humerus fracture     Time reflects when diagnosis was documented in both MDM as applicable and the Disposition within this note       Time User Action Codes Description Comment    10/12/2023  6:50 PM Elda Low Add [V09.052M] Dislocation of right shoulder joint, initial encounter     10/12/2023  6:50 PM Maxwell Christensen Humerus fracture           ED Disposition       ED Disposition   Discharge    Condition   Stable    Date/Time   u Oct 12, 2023 4[address] [facility] discharge to home/self care.                    Follow-up Information    None         Patient's Medications   Discharge Prescriptions    OXYCODONE (ROXICODONE) 5 IMMEDIATE RELEASE TABLET    Take 1 tablet (5 mg total) by mouth every 4 (four) hours as needed for moderate pain for up to 20 doses Max Daily Amount: 30 mg       Start Date: 10/12/2023End Date: --       Order Dose: 5 mg       Quantity: 20 tablet    Refills: 0           PDMP Review       None            ED Provider  Electronically Signed by             Shana Sebastian MD  10/12/23 9076

## 2023-10-17 ENCOUNTER — OFFICE VISIT (OUTPATIENT)
Dept: OBGYN CLINIC | Facility: CLINIC | Age: 73
End: 2023-10-17

## 2023-10-17 ENCOUNTER — HOSPITAL ENCOUNTER (OUTPATIENT)
Dept: CT IMAGING | Facility: CLINIC | Age: 73
Discharge: HOME | End: 2023-10-17
Payer: COMMERCIAL

## 2023-10-17 VITALS
WEIGHT: 158 LBS | SYSTOLIC BLOOD PRESSURE: 144 MMHG | HEART RATE: 102 BPM | BODY MASS INDEX: 28 KG/M2 | DIASTOLIC BLOOD PRESSURE: 81 MMHG | HEIGHT: 63 IN

## 2023-10-17 DIAGNOSIS — S42.309A HUMERUS FRACTURE: ICD-10-CM

## 2023-10-17 DIAGNOSIS — S42.201A CLOSED FRACTURE OF PROXIMAL END OF RIGHT HUMERUS, UNSPECIFIED FRACTURE MORPHOLOGY, INITIAL ENCOUNTER: Primary | ICD-10-CM

## 2023-10-17 DIAGNOSIS — S42.201A CLOSED FRACTURE OF PROXIMAL END OF RIGHT HUMERUS, UNSPECIFIED FRACTURE MORPHOLOGY, INITIAL ENCOUNTER: ICD-10-CM

## 2023-10-17 PROCEDURE — 73200 CT UPPER EXTREMITY W/O DYE: CPT

## 2023-10-17 RX ORDER — CHLORHEXIDINE GLUCONATE ORAL RINSE 1.2 MG/ML
15 SOLUTION DENTAL ONCE
OUTPATIENT
Start: 2023-10-17 | End: 2023-10-17

## 2023-10-17 NOTE — H&P (VIEW-ONLY)
Orthopaedics Office Visit - New Patient Visit    ASSESSMENT/PLAN:    Assessment:   72 y.o female with right proximal humerus greater tuberosity fracture DOI 10/12/23  Displaced GT fracture, most likely >5 mm displacement  No previous shoulder dysfunction    Likely dementia, but able to vocalize her injury and agreeable to surgery, additionally discussed risks and benefits with     Plan:   A STAT CT scan was ordered to better visualize greater tuberosity fracture and for surgical planning  NWB RUE  Continue wearing the sling at all times except for elbow and wrist and finger motion  Patient may come out of the sling for hygiene purposes  Patient may begin sleeping on her back as tolerated  It was discussed that operative treatment is likely recommended, risks and benefits were discussed in detail today. I will call the patient following the CT scan to discuss treatment options    To Do Next Visit:  Reevaluate right proximal humerus fracture    _____________________________________________________  CHIEF COMPLAINT:  Chief Complaint   Patient presents with    Right Shoulder - Pain         SUBJECTIVE:  Geoff is a 72 y.o. RHD female who presents for initial evaluation of the right shoulder after a fall in her kitchen 10/12/23. She was initially treated in the ED where she was placed in a sling. She is taking oxycodone for her pain. She has been wearing the sling all the time, even when she sleeps. She feels mild improvement in pain but notes great difficulty sleeping.      PAST MEDICAL HISTORY:  Past Medical History:   Diagnosis Date    Seasonal allergies        PAST SURGICAL HISTORY:  Past Surgical History:   Procedure Laterality Date     SECTION      COMPLEX WOUND CLOSURE TO EXTREMITY Bilateral 2019    Procedure: COMPLEX WOUND CLOSURE, RIGHT BACK;  Surgeon: Hernan Leyva MD;  [facility];  Service: Plastics    FULL THICKNESS SKIN GRAFT N/A 2019    Procedure: SKIN GRAFT FULL THICKNESS  (FTSG) EXTREMITY;  Surgeon: Hernan Leyva MD;  [facility];  Service: Plastics    MASS EXCISION N/A 7/17/2019    Procedure: EXCISION  BIOPSY LESION/MASS BACK;  Surgeon: Etienne Matthews MD;  [facility];  Service: General    SKIN LESION EXCISION Bilateral 8/22/2019    Procedure: EXCISION WIDE LESION RIGHT BACK;  Surgeon: Maryana Doran MD;  [facility];  Service: Surgical Oncology       FAMILY HISTORY:  Family History   Problem Relation Age of Onset    Heart disease Mother     Bone cancer Father     Cancer Brother         neuroblastoma       SOCIAL HISTORY:  Social History     Tobacco Use    Smoking status: Former     Packs/day: 0.00     Types: Cigarettes    Smokeless tobacco: Never    Tobacco comments:     no cig use as of 8/12/19 , using rare CBD oil vapor   Substance Use Topics    Alcohol use: Not Currently    Drug use: Not Currently       MEDICATIONS:    Current Outpatient Medications:     acetaminophen (TYLENOL) 500 mg tablet, Take 1,000 mg by mouth every 6 (six) hours as needed for mild pain, Disp: , Rfl:     Ascorbic Acid (VITAMIN C) 100 MG tablet, Take 100 mg by mouth daily, Disp: , Rfl:     b complex vitamins tablet, Take 1 tablet by mouth daily, Disp: , Rfl:     cholecalciferol (VITAMIN D3) 400 units tablet, Take 400 Units by mouth daily, Disp: , Rfl:     glucosamine-chondroitin 500-400 MG tablet, Take 1 tablet by mouth 2 (two) times a day , Disp: , Rfl:     Multiple Vitamins-Minerals (MULTIVITAMIN WITH MINERALS) tablet, Take 1 tablet by mouth daily, Disp: , Rfl:     oxyCODONE (Roxicodone) 5 immediate release tablet, Take 1 tablet (5 mg total) by mouth every 4 (four) hours as needed for moderate pain for up to 20 doses Max Daily Amount: 30 mg, Disp: 20 tablet, Rfl: 0    vitamin E 100 UNIT capsule, Take 100 Units by mouth daily, Disp: , Rfl:     diclofenac sodium (VOLTAREN) 50 mg EC tablet, Take 50 mg by mouth 3 (three) times a day as needed, Disp: , Rfl: 0 fluocinonide (LIDEX) 0.05 % cream, Apply topically 2 (two) times a day To the small rash on your arms and back. DO NOT apply to the large wound on your back. (Patient not taking: Reported on 1/19/2021), Disp: 30 g, Rfl: 0    ALLERGIES:  No Known Allergies    REVIEW OF SYSTEMS:  MSK: as noted in HPI  Neuro: WNL  Pertinent items are otherwise noted in HPI. A comprehensive review of systems was otherwise negative. LABS:  HgA1c: No results found for: "HGBA1C"  BMP:   Lab Results   Component Value Date    CALCIUM 9.4 05/23/2020    K 4.7 05/23/2020    CO2 29 05/23/2020     05/23/2020    BUN 11 05/23/2020    CREATININE 0.92 05/23/2020     CBC: No components found for: "CBC"    _____________________________________________________  PHYSICAL EXAMINATION:  Vital signs: /81   Pulse 102   Ht 5' 3" (1.6 m)   Wt 71.7 kg (158 lb)   BMI 27.99 kg/m²   General: No acute distress, awake and alert  Psychiatric: Mood and affect appear appropriate  HEENT: Trachea Midline, No torticollis, no apparent facial trauma  Cardiovascular: No audible murmurs; Extremities appear perfused  Pulmonary: No audible wheezing or stridor  Skin: No open lesions; see further details (if any) below    MUSCULOSKELETAL EXAMINATION:  Extremities:    Right Shoulder: Active range of motion   Not assessed  Patient sitting comfortably with her right arm in a sling  There is tenderness present over the proximal humerus. Ecchymosis over right shoulder and arm but no open skin lesions  The patient is neurovascularly intact distally in the extremity. _____________________________________________________  STUDIES REVIEWED:  I personally reviewed the images and interpretation is as follows:  X-rays of right shoulder taken 10/12/23 displaced fracture of the greater tuberosity. Previous glenohumeral joint dislocation well reduced    PROCEDURES PERFORMED:  Procedures  None performed.        Scribe Attestation      I,:  Marina Flynn am acting as a scribe while in the presence of the attending physician.:       I,:  Jason Pyle MD personally performed the services described in this documentation    as scribed in my presence.:

## 2023-10-17 NOTE — PROGRESS NOTES
Orthopaedics Office Visit - New Patient Visit    ASSESSMENT/PLAN:    Assessment:   72 y.o female with right proximal humerus greater tuberosity fracture DOI 10/12/23  Displaced GT fracture, most likely >5 mm displacement  No previous shoulder dysfunction    Likely dementia, but able to vocalize her injury and agreeable to surgery, additionally discussed risks and benefits with     Plan:   A STAT CT scan was ordered to better visualize greater tuberosity fracture and for surgical planning  NWB RUE  Continue wearing the sling at all times except for elbow and wrist and finger motion  Patient may come out of the sling for hygiene purposes  Patient may begin sleeping on her back as tolerated  It was discussed that operative treatment is likely recommended, risks and benefits were discussed in detail today. I will call the patient following the CT scan to discuss treatment options    To Do Next Visit:  Reevaluate right proximal humerus fracture    _____________________________________________________  CHIEF COMPLAINT:  Chief Complaint   Patient presents with    Right Shoulder - Pain         SUBJECTIVE:  Geoff is a 72 y.o. RHD female who presents for initial evaluation of the right shoulder after a fall in her kitchen 10/12/23. She was initially treated in the ED where she was placed in a sling. She is taking oxycodone for her pain. She has been wearing the sling all the time, even when she sleeps. She feels mild improvement in pain but notes great difficulty sleeping.      PAST MEDICAL HISTORY:  Past Medical History:   Diagnosis Date    Seasonal allergies        PAST SURGICAL HISTORY:  Past Surgical History:   Procedure Laterality Date     SECTION      COMPLEX WOUND CLOSURE TO EXTREMITY Bilateral 2019    Procedure: COMPLEX WOUND CLOSURE, RIGHT BACK;  Surgeon: Donaldo Amos MD;  [facility];  Service: Plastics    FULL THICKNESS SKIN GRAFT N/A 2019    Procedure: SKIN GRAFT FULL THICKNESS  (FTSG) EXTREMITY;  Surgeon: Danica Mcgrath MD;  [facility];  Service: Plastics    MASS EXCISION N/A 7/17/2019    Procedure: EXCISION  BIOPSY LESION/MASS BACK;  Surgeon: Judy Kent MD;  [facility];  Service: General    SKIN LESION EXCISION Bilateral 8/22/2019    Procedure: EXCISION WIDE LESION RIGHT BACK;  Surgeon: Mark Weiss MD;  [facility];  Service: Surgical Oncology       FAMILY HISTORY:  Family History   Problem Relation Age of Onset    Heart disease Mother     Bone cancer Father     Cancer Brother         neuroblastoma       SOCIAL HISTORY:  Social History     Tobacco Use    Smoking status: Former     Packs/day: 0.00     Types: Cigarettes    Smokeless tobacco: Never    Tobacco comments:     no cig use as of 8/12/19 , using rare CBD oil vapor   Substance Use Topics    Alcohol use: Not Currently    Drug use: Not Currently       MEDICATIONS:    Current Outpatient Medications:     acetaminophen (TYLENOL) 500 mg tablet, Take 1,000 mg by mouth every 6 (six) hours as needed for mild pain, Disp: , Rfl:     Ascorbic Acid (VITAMIN C) 100 MG tablet, Take 100 mg by mouth daily, Disp: , Rfl:     b complex vitamins tablet, Take 1 tablet by mouth daily, Disp: , Rfl:     cholecalciferol (VITAMIN D3) 400 units tablet, Take 400 Units by mouth daily, Disp: , Rfl:     glucosamine-chondroitin 500-400 MG tablet, Take 1 tablet by mouth 2 (two) times a day , Disp: , Rfl:     Multiple Vitamins-Minerals (MULTIVITAMIN WITH MINERALS) tablet, Take 1 tablet by mouth daily, Disp: , Rfl:     oxyCODONE (Roxicodone) 5 immediate release tablet, Take 1 tablet (5 mg total) by mouth every 4 (four) hours as needed for moderate pain for up to 20 doses Max Daily Amount: 30 mg, Disp: 20 tablet, Rfl: 0    vitamin E 100 UNIT capsule, Take 100 Units by mouth daily, Disp: , Rfl:     diclofenac sodium (VOLTAREN) 50 mg EC tablet, Take 50 mg by mouth 3 (three) times a day as needed, Disp: , Rfl: 0 fluocinonide (LIDEX) 0.05 % cream, Apply topically 2 (two) times a day To the small rash on your arms and back. DO NOT apply to the large wound on your back. (Patient not taking: Reported on 1/19/2021), Disp: 30 g, Rfl: 0    ALLERGIES:  No Known Allergies    REVIEW OF SYSTEMS:  MSK: as noted in HPI  Neuro: WNL  Pertinent items are otherwise noted in HPI. A comprehensive review of systems was otherwise negative. LABS:  HgA1c: No results found for: "HGBA1C"  BMP:   Lab Results   Component Value Date    CALCIUM 9.4 05/23/2020    K 4.7 05/23/2020    CO2 29 05/23/2020     05/23/2020    BUN 11 05/23/2020    CREATININE 0.92 05/23/2020     CBC: No components found for: "CBC"    _____________________________________________________  PHYSICAL EXAMINATION:  Vital signs: /81   Pulse 102   Ht 5' 3" (1.6 m)   Wt 71.7 kg (158 lb)   BMI 27.99 kg/m²   General: No acute distress, awake and alert  Psychiatric: Mood and affect appear appropriate  HEENT: Trachea Midline, No torticollis, no apparent facial trauma  Cardiovascular: No audible murmurs; Extremities appear perfused  Pulmonary: No audible wheezing or stridor  Skin: No open lesions; see further details (if any) below    MUSCULOSKELETAL EXAMINATION:  Extremities:    Right Shoulder: Active range of motion   Not assessed  Patient sitting comfortably with her right arm in a sling  There is tenderness present over the proximal humerus. Ecchymosis over right shoulder and arm but no open skin lesions  The patient is neurovascularly intact distally in the extremity. _____________________________________________________  STUDIES REVIEWED:  I personally reviewed the images and interpretation is as follows:  X-rays of right shoulder taken 10/12/23 displaced fracture of the greater tuberosity. Previous glenohumeral joint dislocation well reduced    PROCEDURES PERFORMED:  Procedures  None performed.        Scribe Attestation      I,:  Dhruv De Leon am acting as a scribe while in the presence of the attending physician.:       I,:  Vladislav Cancino MD personally performed the services described in this documentation    as scribed in my presence.:

## 2023-10-18 ENCOUNTER — TELEPHONE (OUTPATIENT)
Dept: OBGYN CLINIC | Facility: CLINIC | Age: 73
End: 2023-10-18

## 2023-10-18 RX ORDER — LORATADINE 10 MG/1
10 TABLET ORAL DAILY
COMMUNITY

## 2023-10-18 RX ORDER — FLUTICASONE PROPIONATE 50 MCG
1 SPRAY, SUSPENSION (ML) NASAL DAILY
COMMUNITY

## 2023-10-18 NOTE — PRE-PROCEDURE INSTRUCTIONS
Pre-Surgery Instructions:   Medication Instructions    acetaminophen (TYLENOL) 500 mg tablet Uses PRN- OK to take day of surgery    Ascorbic Acid (VITAMIN C) 100 MG tablet Stop taking 7 days prior to surgery. b complex vitamins tablet Stop taking 7 days prior to surgery. cholecalciferol (VITAMIN D3) 400 units tablet Stop taking 7 days prior to surgery. fluticasone (FLONASE) 50 mcg/act nasal spray Uses PRN- OK to take day of surgery    glucosamine-chondroitin 500-400 MG tablet Stop taking 7 days prior to surgery. loratadine (CLARITIN) 10 mg tablet Uses PRN- OK to take day of surgery    Multiple Vitamins-Minerals (MULTIVITAMIN WITH MINERALS) tablet Stop taking 7 days prior to surgery. oxyCODONE (Roxicodone) 5 immediate release tablet Uses PRN- OK to take day of surgery    vitamin E 100 UNIT capsule Stop taking 7 days prior to surgery. Spoke with pts  via phone. Medication instructions for day surgery reviewed. Please use only a sip of water to take your instructed medications. Avoid all over the counter vitamins, supplements and NSAIDS for one week prior to surgery per anesthesia guidelines. Tylenol is ok to take as needed. You will receive a call one business day prior to surgery with an arrival time and hospital directions. If your surgery is scheduled on a Monday, the hospital will be calling you on the Friday prior to your surgery. If you have not heard from anyone by 8pm, please call the hospital supervisor through the hospital  at [phone]. [facility] [phone]). Do not eat or drink anything after midnight the night before your surgery, including candy, mints, lifesavers, or chewing gum. Do not drink alcohol 24hrs before your surgery. Try not to smoke at least 24hrs before your surgery. Follow the pre surgery showering instructions as listed in the [facility] Surgical Experience Booklet” or otherwise provided by your surgeon's office.  Do not shave the surgical area 24 hours before surgery. Do not apply any lotions, creams, including makeup, cologne, deodorant, or perfumes after showering on the day of your surgery. No contact lenses, eye make-up, or artificial eyelashes. Remove nail polish, including gel polish, and any artificial, gel, or acrylic nails if possible. Remove all jewelry including rings and body piercing jewelry. Wear causal clothing that is easy to take on and off. Consider your type of surgery. Keep any valuables, jewelry, piercings at home. Please bring any specially ordered equipment (sling, braces) if indicated. Arrange for a responsible person to drive you to and from the hospital on the day of your surgery. Visitor Guidelines discussed. Call the surgeon's office with any new illnesses, exposures, or additional questions prior to surgery. Please reference your [facility] Surgical Experience Booklet” for additional information to prepare for your upcoming surgery.

## 2023-10-18 NOTE — TELEPHONE ENCOUNTER
Patient's  stated that she was prescribed Oxycodone after she was injured. She has two pills left and is asking if she can get a refill sent in to there pharmacy. He is requesting a call to let him know when sent.

## 2023-10-18 NOTE — TELEPHONE ENCOUNTER
Spoke with the patient's  regarding upcoming sx. Sx is scheduled at [facility]. PO scheduled for 11.7.23 @ 10:45 am at the [place] office. They were informed that Labs and EKG will need to be completed prior to sx at any [facility] facility. That these test are done as a walk in service and that no appt is necessary. Per Dr. Keene Beams patient will not need to see PCP for clearance. Patient was asked to not eat or drink anything after midnight the night before. She is aware that the hospital will call the afternoon prior to let her know what time to check in for her surgery. She will need a ride home after she is discharged as she will not be able to drive.

## 2023-10-19 ENCOUNTER — LAB (OUTPATIENT)
Dept: LAB | Facility: HOSPITAL | Age: 73
End: 2023-10-19
Payer: COMMERCIAL

## 2023-10-19 ENCOUNTER — LAB REQUISITION (OUTPATIENT)
Dept: LAB | Facility: HOSPITAL | Age: 73
End: 2023-10-19
Payer: COMMERCIAL

## 2023-10-19 ENCOUNTER — APPOINTMENT (OUTPATIENT)
Dept: LAB | Facility: HOSPITAL | Age: 73
End: 2023-10-19
Payer: COMMERCIAL

## 2023-10-19 DIAGNOSIS — Z01.818 PRE-OP EVALUATION: ICD-10-CM

## 2023-10-19 DIAGNOSIS — S42.201A CLOSED FRACTURE OF PROXIMAL END OF RIGHT HUMERUS, UNSPECIFIED FRACTURE MORPHOLOGY, INITIAL ENCOUNTER: ICD-10-CM

## 2023-10-19 DIAGNOSIS — Z01.818 ENCOUNTER FOR OTHER PREPROCEDURAL EXAMINATION: ICD-10-CM

## 2023-10-19 LAB
ABO GROUP BLD: NORMAL
ANION GAP SERPL CALCULATED.3IONS-SCNC: 7 MMOL/L
ATRIAL RATE: 71 BPM
BASOPHILS # BLD AUTO: 0.03 THOUSANDS/ÂΜL (ref 0–0.1)
BASOPHILS NFR BLD AUTO: 1 % (ref 0–1)
BLD GP AB SCN SERPL QL: NEGATIVE
BUN SERPL-MCNC: 20 MG/DL (ref 5–25)
CALCIUM SERPL-MCNC: 9.8 MG/DL (ref 8.4–10.2)
CHLORIDE SERPL-SCNC: 104 MMOL/L (ref 96–108)
CO2 SERPL-SCNC: 28 MMOL/L (ref 21–32)
CREAT SERPL-MCNC: 0.67 MG/DL (ref 0.6–1.3)
EOSINOPHIL # BLD AUTO: 0.19 THOUSAND/ÂΜL (ref 0–0.61)
EOSINOPHIL NFR BLD AUTO: 5 % (ref 0–6)
ERYTHROCYTE [DISTWIDTH] IN BLOOD BY AUTOMATED COUNT: 13.2 % (ref 11.6–15.1)
GFR SERPL CREATININE-BSD FRML MDRD: 88 ML/MIN/1.73SQ M
GLUCOSE P FAST SERPL-MCNC: 99 MG/DL (ref 65–99)
HCT VFR BLD AUTO: 36 % (ref 34.8–46.1)
HGB BLD-MCNC: 11.6 G/DL (ref 11.5–15.4)
IMM GRANULOCYTES # BLD AUTO: 0.01 THOUSAND/UL (ref 0–0.2)
IMM GRANULOCYTES NFR BLD AUTO: 0 % (ref 0–2)
LYMPHOCYTES # BLD AUTO: 0.8 THOUSANDS/ÂΜL (ref 0.6–4.47)
LYMPHOCYTES NFR BLD AUTO: 21 % (ref 14–44)
MCH RBC QN AUTO: 29 PG (ref 26.8–34.3)
MCHC RBC AUTO-ENTMCNC: 32.2 G/DL (ref 31.4–37.4)
MCV RBC AUTO: 90 FL (ref 82–98)
MONOCYTES # BLD AUTO: 0.24 THOUSAND/ÂΜL (ref 0.17–1.22)
MONOCYTES NFR BLD AUTO: 6 % (ref 4–12)
NEUTROPHILS # BLD AUTO: 2.56 THOUSANDS/ÂΜL (ref 1.85–7.62)
NEUTS SEG NFR BLD AUTO: 67 % (ref 43–75)
NRBC BLD AUTO-RTO: 0 /100 WBCS
P AXIS: 48 DEGREES
PLATELET # BLD AUTO: 304 THOUSANDS/UL (ref 149–390)
PMV BLD AUTO: 9.4 FL (ref 8.9–12.7)
POTASSIUM SERPL-SCNC: 3.7 MMOL/L (ref 3.5–5.3)
PR INTERVAL: 144 MS
QRS AXIS: 32 DEGREES
QRSD INTERVAL: 92 MS
QT INTERVAL: 388 MS
QTC INTERVAL: 421 MS
RBC # BLD AUTO: 4 MILLION/UL (ref 3.81–5.12)
RH BLD: POSITIVE
SODIUM SERPL-SCNC: 139 MMOL/L (ref 135–147)
SPECIMEN EXPIRATION DATE: NORMAL
T WAVE AXIS: 8 DEGREES
VENTRICULAR RATE: 71 BPM
WBC # BLD AUTO: 3.83 THOUSAND/UL (ref 4.31–10.16)

## 2023-10-19 PROCEDURE — 85025 COMPLETE CBC W/AUTO DIFF WBC: CPT

## 2023-10-19 PROCEDURE — 93005 ELECTROCARDIOGRAM TRACING: CPT

## 2023-10-19 PROCEDURE — 36415 COLL VENOUS BLD VENIPUNCTURE: CPT

## 2023-10-19 PROCEDURE — 86901 BLOOD TYPING SEROLOGIC RH(D): CPT | Performed by: ORTHOPAEDIC SURGERY

## 2023-10-19 PROCEDURE — 80048 BASIC METABOLIC PNL TOTAL CA: CPT

## 2023-10-19 PROCEDURE — 86850 RBC ANTIBODY SCREEN: CPT | Performed by: ORTHOPAEDIC SURGERY

## 2023-10-19 PROCEDURE — 86900 BLOOD TYPING SEROLOGIC ABO: CPT | Performed by: ORTHOPAEDIC SURGERY

## 2023-10-20 DIAGNOSIS — S42.309A HUMERUS FRACTURE: ICD-10-CM

## 2023-10-20 RX ORDER — OXYCODONE HYDROCHLORIDE 5 MG/1
5 TABLET ORAL EVERY 4 HOURS PRN
Qty: 15 TABLET | Refills: 0 | Status: ON HOLD | OUTPATIENT
Start: 2023-10-20 | End: 2023-10-25 | Stop reason: SDUPTHER

## 2023-10-20 NOTE — TELEPHONE ENCOUNTER
Spoke with patient via telephone. Patient only has two tablets left of oxycodone. Patient is taking oxycodone as needed only for severe pain. Will provide refill prior to surgical intervention. Patient aware.

## 2023-10-24 ENCOUNTER — ANESTHESIA EVENT (OUTPATIENT)
Dept: PERIOP | Facility: HOSPITAL | Age: 73
End: 2023-10-24
Payer: COMMERCIAL

## 2023-10-25 ENCOUNTER — HOSPITAL ENCOUNTER (OUTPATIENT)
Facility: HOSPITAL | Age: 73
Setting detail: OUTPATIENT SURGERY
Discharge: HOME | End: 2023-10-25
Attending: ORTHOPAEDIC SURGERY | Admitting: ORTHOPAEDIC SURGERY
Payer: COMMERCIAL

## 2023-10-25 ENCOUNTER — HOSPITAL ENCOUNTER (OUTPATIENT)
Dept: RADIOLOGY | Facility: HOSPITAL | Age: 73
Setting detail: OUTPATIENT SURGERY
Discharge: HOME | End: 2023-10-25
Payer: COMMERCIAL

## 2023-10-25 ENCOUNTER — ANESTHESIA (OUTPATIENT)
Dept: PERIOP | Facility: HOSPITAL | Age: 73
End: 2023-10-25
Payer: COMMERCIAL

## 2023-10-25 VITALS
HEIGHT: 63 IN | BODY MASS INDEX: 28 KG/M2 | OXYGEN SATURATION: 95 % | DIASTOLIC BLOOD PRESSURE: 75 MMHG | TEMPERATURE: 97.9 F | SYSTOLIC BLOOD PRESSURE: 147 MMHG | HEART RATE: 77 BPM | RESPIRATION RATE: 18 BRPM | WEIGHT: 158 LBS

## 2023-10-25 DIAGNOSIS — S42.291A OTHER CLOSED DISPLACED FRACTURE OF PROXIMAL END OF RIGHT HUMERUS, INITIAL ENCOUNTER: Primary | ICD-10-CM

## 2023-10-25 DIAGNOSIS — S42.309A HUMERUS FRACTURE: ICD-10-CM

## 2023-10-25 DIAGNOSIS — S42.201A CLOSED FRACTURE OF PROXIMAL END OF RIGHT HUMERUS, UNSPECIFIED FRACTURE MORPHOLOGY, INITIAL ENCOUNTER: ICD-10-CM

## 2023-10-25 PROCEDURE — C1769 GUIDE WIRE: HCPCS | Performed by: ORTHOPAEDIC SURGERY

## 2023-10-25 PROCEDURE — 23630 OPTX GR HMRL TBRS FX INT FIX: CPT | Performed by: PHYSICIAN ASSISTANT

## 2023-10-25 PROCEDURE — C1713 ANCHOR/SCREW BN/BN,TIS/BN: HCPCS | Performed by: ORTHOPAEDIC SURGERY

## 2023-10-25 PROCEDURE — 23630 OPTX GR HMRL TBRS FX INT FIX: CPT | Performed by: ORTHOPAEDIC SURGERY

## 2023-10-25 PROCEDURE — 73030 X-RAY EXAM OF SHOULDER: CPT

## 2023-10-25 DEVICE — IMPLANTABLE DEVICE: Type: IMPLANTABLE DEVICE | Site: ARM | Status: FUNCTIONAL

## 2023-10-25 RX ORDER — CEFAZOLIN SODIUM 2 G/50ML
SOLUTION INTRAVENOUS AS NEEDED
Status: DISCONTINUED | OUTPATIENT
Start: 2023-10-25 | End: 2023-10-25

## 2023-10-25 RX ORDER — FENTANYL CITRATE 50 UG/ML
INJECTION, SOLUTION INTRAMUSCULAR; INTRAVENOUS AS NEEDED
Status: DISCONTINUED | OUTPATIENT
Start: 2023-10-25 | End: 2023-10-25

## 2023-10-25 RX ORDER — OXYCODONE HYDROCHLORIDE 5 MG/1
5 TABLET ORAL EVERY 4 HOURS PRN
Qty: 30 TABLET | Refills: 0 | Status: SHIPPED | OUTPATIENT
Start: 2023-10-25

## 2023-10-25 RX ORDER — ONDANSETRON 2 MG/ML
4 INJECTION INTRAMUSCULAR; INTRAVENOUS ONCE AS NEEDED
Status: DISCONTINUED | OUTPATIENT
Start: 2023-10-25 | End: 2023-10-25 | Stop reason: HOSPADM

## 2023-10-25 RX ORDER — ROCURONIUM BROMIDE 10 MG/ML
INJECTION, SOLUTION INTRAVENOUS AS NEEDED
Status: DISCONTINUED | OUTPATIENT
Start: 2023-10-25 | End: 2023-10-25

## 2023-10-25 RX ORDER — LIDOCAINE HYDROCHLORIDE 10 MG/ML
INJECTION, SOLUTION EPIDURAL; INFILTRATION; INTRACAUDAL; PERINEURAL AS NEEDED
Status: DISCONTINUED | OUTPATIENT
Start: 2023-10-25 | End: 2023-10-25

## 2023-10-25 RX ORDER — HYDROMORPHONE HCL/PF 1 MG/ML
0.5 SYRINGE (ML) INJECTION
Status: DISCONTINUED | OUTPATIENT
Start: 2023-10-25 | End: 2023-10-25 | Stop reason: HOSPADM

## 2023-10-25 RX ORDER — TRAMADOL HYDROCHLORIDE 50 MG/1
50 TABLET ORAL EVERY 6 HOURS SCHEDULED
Status: CANCELLED | OUTPATIENT
Start: 2023-10-25

## 2023-10-25 RX ORDER — PROMETHAZINE HYDROCHLORIDE 25 MG/ML
12.5 INJECTION, SOLUTION INTRAMUSCULAR; INTRAVENOUS ONCE AS NEEDED
Status: DISCONTINUED | OUTPATIENT
Start: 2023-10-25 | End: 2023-10-25 | Stop reason: HOSPADM

## 2023-10-25 RX ORDER — ONDANSETRON 2 MG/ML
INJECTION INTRAMUSCULAR; INTRAVENOUS AS NEEDED
Status: DISCONTINUED | OUTPATIENT
Start: 2023-10-25 | End: 2023-10-25

## 2023-10-25 RX ORDER — ONDANSETRON 2 MG/ML
4 INJECTION INTRAMUSCULAR; INTRAVENOUS EVERY 6 HOURS PRN
Status: CANCELLED | OUTPATIENT
Start: 2023-10-25

## 2023-10-25 RX ORDER — LABETALOL HYDROCHLORIDE 5 MG/ML
5 INJECTION, SOLUTION INTRAVENOUS
Status: DISCONTINUED | OUTPATIENT
Start: 2023-10-25 | End: 2023-10-25 | Stop reason: HOSPADM

## 2023-10-25 RX ORDER — FENTANYL CITRATE/PF 50 MCG/ML
25 SYRINGE (ML) INJECTION
Status: DISCONTINUED | OUTPATIENT
Start: 2023-10-25 | End: 2023-10-25 | Stop reason: HOSPADM

## 2023-10-25 RX ORDER — MAGNESIUM HYDROXIDE 1200 MG/15ML
LIQUID ORAL AS NEEDED
Status: DISCONTINUED | OUTPATIENT
Start: 2023-10-25 | End: 2023-10-25 | Stop reason: HOSPADM

## 2023-10-25 RX ORDER — MEPERIDINE HYDROCHLORIDE 25 MG/ML
12.5 INJECTION INTRAMUSCULAR; INTRAVENOUS; SUBCUTANEOUS ONCE
Status: DISCONTINUED | OUTPATIENT
Start: 2023-10-25 | End: 2023-10-25 | Stop reason: HOSPADM

## 2023-10-25 RX ORDER — SODIUM CHLORIDE, SODIUM LACTATE, POTASSIUM CHLORIDE, CALCIUM CHLORIDE 600; 310; 30; 20 MG/100ML; MG/100ML; MG/100ML; MG/100ML
125 INJECTION, SOLUTION INTRAVENOUS CONTINUOUS
Status: CANCELLED | OUTPATIENT
Start: 2023-10-25

## 2023-10-25 RX ORDER — HYDROMORPHONE HYDROCHLORIDE 2 MG/ML
INJECTION, SOLUTION INTRAMUSCULAR; INTRAVENOUS; SUBCUTANEOUS AS NEEDED
Status: DISCONTINUED | OUTPATIENT
Start: 2023-10-25 | End: 2023-10-25

## 2023-10-25 RX ORDER — DEXAMETHASONE SODIUM PHOSPHATE 10 MG/ML
INJECTION, SOLUTION INTRAMUSCULAR; INTRAVENOUS AS NEEDED
Status: DISCONTINUED | OUTPATIENT
Start: 2023-10-25 | End: 2023-10-25

## 2023-10-25 RX ORDER — SODIUM CHLORIDE, SODIUM LACTATE, POTASSIUM CHLORIDE, CALCIUM CHLORIDE 600; 310; 30; 20 MG/100ML; MG/100ML; MG/100ML; MG/100ML
INJECTION, SOLUTION INTRAVENOUS CONTINUOUS PRN
Status: DISCONTINUED | OUTPATIENT
Start: 2023-10-25 | End: 2023-10-25

## 2023-10-25 RX ORDER — PROPOFOL 10 MG/ML
INJECTION, EMULSION INTRAVENOUS AS NEEDED
Status: DISCONTINUED | OUTPATIENT
Start: 2023-10-25 | End: 2023-10-25

## 2023-10-25 RX ORDER — ALBUTEROL SULFATE 2.5 MG/3ML
2.5 SOLUTION RESPIRATORY (INHALATION) ONCE AS NEEDED
Status: DISCONTINUED | OUTPATIENT
Start: 2023-10-25 | End: 2023-10-25 | Stop reason: HOSPADM

## 2023-10-25 RX ORDER — ACETAMINOPHEN 325 MG/1
650 TABLET ORAL EVERY 6 HOURS PRN
Status: CANCELLED | OUTPATIENT
Start: 2023-10-25

## 2023-10-25 RX ADMIN — CEFAZOLIN SODIUM 2000 MG: 2 SOLUTION INTRAVENOUS at 13:06

## 2023-10-25 RX ADMIN — PROPOFOL 150 MG: 10 INJECTION, EMULSION INTRAVENOUS at 12:53

## 2023-10-25 RX ADMIN — ROCURONIUM BROMIDE 10 MG: 10 INJECTION, SOLUTION INTRAVENOUS at 14:26

## 2023-10-25 RX ADMIN — SODIUM CHLORIDE, SODIUM LACTATE, POTASSIUM CHLORIDE, AND CALCIUM CHLORIDE: .6; .31; .03; .02 INJECTION, SOLUTION INTRAVENOUS at 12:49

## 2023-10-25 RX ADMIN — FENTANYL CITRATE 100 MCG: 50 INJECTION INTRAMUSCULAR; INTRAVENOUS at 12:49

## 2023-10-25 RX ADMIN — PHENYLEPHRINE HYDROCHLORIDE 30 MCG/MIN: 10 INJECTION INTRAVENOUS at 13:03

## 2023-10-25 RX ADMIN — LIDOCAINE HYDROCHLORIDE 50 MG: 10 INJECTION, SOLUTION EPIDURAL; INFILTRATION; INTRACAUDAL; PERINEURAL at 12:53

## 2023-10-25 RX ADMIN — ROCURONIUM BROMIDE 50 MG: 10 INJECTION, SOLUTION INTRAVENOUS at 12:53

## 2023-10-25 RX ADMIN — DEXAMETHASONE SODIUM PHOSPHATE 10 MG: 10 INJECTION, SOLUTION INTRAMUSCULAR; INTRAVENOUS at 12:55

## 2023-10-25 RX ADMIN — HYDROMORPHONE HYDROCHLORIDE 0.5 MG: 2 INJECTION, SOLUTION INTRAMUSCULAR; INTRAVENOUS; SUBCUTANEOUS at 15:52

## 2023-10-25 RX ADMIN — HYDROMORPHONE HYDROCHLORIDE 0.3 MG: 2 INJECTION, SOLUTION INTRAMUSCULAR; INTRAVENOUS; SUBCUTANEOUS at 15:37

## 2023-10-25 RX ADMIN — ROCURONIUM BROMIDE 30 MG: 10 INJECTION, SOLUTION INTRAVENOUS at 13:37

## 2023-10-25 RX ADMIN — ROCURONIUM BROMIDE 10 MG: 10 INJECTION, SOLUTION INTRAVENOUS at 14:10

## 2023-10-25 RX ADMIN — ONDANSETRON 4 MG: 2 INJECTION INTRAMUSCULAR; INTRAVENOUS at 12:55

## 2023-10-25 RX ADMIN — HYDROMORPHONE HYDROCHLORIDE 0.2 MG: 2 INJECTION, SOLUTION INTRAMUSCULAR; INTRAVENOUS; SUBCUTANEOUS at 14:45

## 2023-10-25 RX ADMIN — SUGAMMADEX 200 MG: 100 INJECTION, SOLUTION INTRAVENOUS at 15:54

## 2023-10-25 NOTE — INTERVAL H&P NOTE
H&P reviewed. After examining the patient I find no changes in the patients condition since the H&P had been written. After review of recent CT, it was discovered that patient's fracture was operative in nature. Risks and benefits were discussed with patient and patient's  at length for the following procedure: Operative fixation right proximal humerus fracture and all other associated procedures.   Informed consent was obtained at this time with patient and patient's  all questions and concerns were answered    Abdomen : soft, present     Vitals:    10/25/23 1130   BP: 168/93   Pulse: 98   Resp: 18   Temp: 98.3 °F (36.8 °C)   SpO2: 100%

## 2023-10-25 NOTE — DISCHARGE INSTR - AVS FIRST PAGE
Discharge Instructions - Trent Kay 72 y.o. female MRN: [id number]  Unit/Bed#: PACU 08    Weight Bearing Status:                                           Non-weight bearing right upper extremity    DVT prophylaxis:  N/A     Pain:  Continue analgesics as directed    Dressing Instructions:   Please keep clean, dry and intact until follow up. Maintain sling at all times     Appt Instructions: If you do not have your appointment, please call the clinic at [phone]  Otherwise follow up as scheduled. Contact the office sooner if you experience any increased numbness/tingling in the extremities.

## 2023-10-25 NOTE — ANESTHESIA POSTPROCEDURE EVALUATION
Post-Op Assessment Note    CV Status:  Stable  Pain Score: 0    Pain management: adequate  Multimodal analgesia used between 6 hours prior to anesthesia start to PACU discharge    Mental Status:  Alert and awake   Hydration Status:  Euvolemic and stable   PONV Controlled:  Controlled   Airway Patency:  Patent   Two or more mitigation strategies used for obstructive sleep apnea   Post Op Vitals Reviewed: Yes      Staff: CRNA         No notable events documented.     /79 (10/25/23 1609)    Temp (!) 96.3 °F (35.7 °C) (10/25/23 1609)    Pulse 89 (10/25/23 1609)   Resp 22 (10/25/23 1609)    SpO2 99 % (10/25/23 1609)

## 2023-10-25 NOTE — ANESTHESIA PREPROCEDURE EVALUATION
Procedure:  OPEN REDUCTION W/ INTERNAL FIXATION (ORIF) RIGHT PROX HUMERUS-GREATER TUBEROSITY FRACTURE (Right: Arm Upper)    Relevant Problems   No relevant active problems        Physical Exam    Airway    Mallampati score: II  TM Distance: >3 FB  Neck ROM: full     Dental   No notable dental hx     Cardiovascular      Pulmonary      Other Findings        Anesthesia Plan  ASA Score- 2     Anesthesia Type- general with ASA Monitors. Additional Monitors:     Airway Plan: ETT. Comment: Patient seen and examined. History reviewed. Patient to be done under general anesthesia with ETT and routine monitors. Risks discussed with the patient. Consent obtained. Patient seen and examined. History reviewed. Patient to be done under general anesthesia with ETT and routine monitors. Risks discussed with the patient. Consent obtained. .       Plan Factors-Exercise tolerance (METS): >4 METS. Chart reviewed. Existing labs reviewed. Patient summary reviewed. Induction- intravenous. Postoperative Plan- Plan for postoperative opioid use. Planned trial extubation    Informed Consent- Anesthetic plan and risks discussed with patient. I personally reviewed this patient with the CRNA. Discussed and agreed on the Anesthesia Plan with the CRNA. Gabe Hanson

## 2023-10-26 RX ORDER — BUPIVACAINE HYDROCHLORIDE 5 MG/ML
INJECTION, SOLUTION EPIDURAL; INTRACAUDAL
Status: DISCONTINUED | OUTPATIENT
Start: 2023-10-26 | End: 2023-10-26

## 2023-10-26 RX ADMIN — BUPIVACAINE HYDROCHLORIDE 5 ML: 5 INJECTION, SOLUTION EPIDURAL; INTRACAUDAL; PERINEURAL at 12:30

## 2023-10-26 RX ADMIN — BUPIVACAINE 15 ML: 13.3 INJECTION, SUSPENSION, LIPOSOMAL INFILTRATION at 12:30

## 2023-10-26 NOTE — ANESTHESIA PROCEDURE NOTES
Peripheral Block    Patient location during procedure: holding area  Start time: 10/25/2023 12:20 PM  Reason for block: at surgeon's request and post-op pain management  Staffing  Performed by: Carlos Jama MD  Authorized by: Carlos Jama MD    Preanesthetic Checklist  Completed: patient identified, IV checked, site marked, risks and benefits discussed, surgical consent, monitors and equipment checked, pre-op evaluation and timeout performed  Peripheral Block  Patient position: sitting  Prep: ChloraPrep  Patient monitoring: frequent blood pressure checks, continuous pulse oximetry and heart rate  Block type: Interscalene  Laterality: right  Injection technique: single-shot  Procedures: ultrasound guided, Ultrasound guidance required for the procedure to increase accuracy and safety of medication placement and decrease risk of complications.   Ultrasound permanent image savedbupivacaine (PF) (MARCAINE) 0.5 % injection 20 mL - Perineural   5 mL - 10/26/2023 12:30:00 PM  bupivacaine liposomal (EXPAREL) 1.3 % injection 20 mL - Perineural   15 mL - 10/26/2023 12:30:00 PM  Needle  Needle type: Stimuplex   Needle length: 4 in  Needle localization: anatomical landmarks and ultrasound guidance  Needle insertion depth: 4 cm  Assessment  Injection assessment: incremental injection, frequent aspiration, injected with ease, negative aspiration, negative for heart rate change, no paresthesia on injection, no symptoms of intraneural/intravenous injection and needle tip visualized at all times  Paresthesia pain: none  Post-procedure:  site cleaned  patient tolerated the procedure well with no immediate complications

## 2023-10-26 NOTE — OP NOTE
OPERATIVE REPORT  PATIENT NAME: Randy Sacks    :  1950  MRN: [id number]  Pt Location: [facility] OR ROOM 18    SURGERY DATE: 10/25/2023    Surgeon(s) and Role:     * Antonieta Jeff MD - Primary     * Rosemary Ritchie PA-C - Assisting    Preop Diagnosis:  Displaced R proximal humerus greater tuberosity fracture    Postop Diagnosis:  Same    Procedure(s):  Right - OPEN REDUCTION W/ INTERNAL FIXATION (ORIF) RIGHT PROX HUMERUS-GREATER TUBEROSITY FRACTURE - CPT 02217    Specimen(s):  * No specimens in log *    Estimated Blood Loss:   50 cc    Drains:  * No LDAs found *    Anesthesia Type:   General    Operative Indications:  Displaced, comminuted R greater tuberosity fracture after dislocation, displacement of fragments in followup period    Operative Findings:  See below    Complications:   None    Implants: Synthes CCHS 3.5 mm x 3; ethibond cuff sutures    Procedure and Technique:  The patient is a 73F with a comminuted R greater tuberosity fracture with wide displacement of fracture after a humeral fracture-dislocation. [address] [place] joint was since reduced/well located. She understands risks which are documented in H&P update. The patient's operative sie, laterality, procedure, consent were verified in the preop area and the patient was taken to the OR. General anesthesia was induced and the operative extremity was prepped/draped in usual sterile fashion. After timeout for safety, laterally based incision was made and standard deltoid split approach took place to the shoulder. The axillary nerve was identified and protected throughout the entirety of the case. The patient had a large cancellous fragment of bone from the humeral head that had displaced laterally as well as a multifragmentary fracture of greater tuberosity identified. Ethibond sutures were placed through the rotator cuff tendon attached to the GT fragment and sutures were utilized to maneuver major fragment into position.  Other fragments were aligned in best position possible and held in place with wires. Multiple CCHS were placed bicortically to provide fixation of these fragments. The ethibond sutures were then tied down in figure of 8 fashion to the intact distal periosteum taking care to protect the nerve. Fixation was stable through range of motion testing and live fluoroscopic examination. The wound was copiously irrigated with sterile saline. The interval/fascia was closed with heavy vicryl sutures. Subcutaneous layer and skin were closed with vicryl and nylon sutures. Sterile dressing was applied. A sling was placed. All final counts were correct. I was present for the entire procedure. There were no immediate complications. The patient was extubated and awakened and taken to the PACU in stable condition. There was no qualified resident available for the procedure. Critical assistance from Madalyn Stirnger PA-C was required for all components of the procedure including but not limited to positioning, soft tissue retraction, obtaining proper alignment of fracture fragments and passage of instrumentation, and soft tissue closure. The patient will be NWB on the operative extremity for 6 wks pending healing. She will strictly avoid abducting her arm. She may remove dressing and shower in 7 days. We will consider suture removal in 2 wks.       Patient Disposition:  PACU         SIGNATURE: Jason Pyle MD  DATE: October 25, 2023  TIME: 9:13 PM

## 2023-10-31 ENCOUNTER — TELEPHONE (OUTPATIENT)
Age: 73
End: 2023-10-31

## 2023-10-31 NOTE — TELEPHONE ENCOUNTER
Caller: spouse    Doctor: Rasta Molina    Reason for call: patient has some questions and would like a call back asap    Call back#: [phone]

## 2023-10-31 NOTE — TELEPHONE ENCOUNTER
Spoke with patient's  at length. Patient may remove dressing and begin showering tomorrow. Dressing does not need to be replaced. Advised that patient should allow water to run over incision site and do not vigorously scrub the area. Do not soak incision site until otherwise stated. Recommended patient not to move her arm while in the shower.   Patient's  had no further questions or concerns at this time

## 2023-11-05 DIAGNOSIS — S42.201A CLOSED FRACTURE OF PROXIMAL END OF RIGHT HUMERUS, UNSPECIFIED FRACTURE MORPHOLOGY, INITIAL ENCOUNTER: Primary | ICD-10-CM

## 2023-11-07 ENCOUNTER — APPOINTMENT (OUTPATIENT)
Dept: RADIOLOGY | Facility: CLINIC | Age: 73
End: 2023-11-07
Payer: COMMERCIAL

## 2023-11-07 ENCOUNTER — OFFICE VISIT (OUTPATIENT)
Dept: OBGYN CLINIC | Facility: CLINIC | Age: 73
End: 2023-11-07

## 2023-11-07 VITALS
HEIGHT: 63 IN | HEART RATE: 85 BPM | SYSTOLIC BLOOD PRESSURE: 192 MMHG | DIASTOLIC BLOOD PRESSURE: 77 MMHG | BODY MASS INDEX: 28 KG/M2 | WEIGHT: 158 LBS

## 2023-11-07 DIAGNOSIS — Z87.81 S/P ORIF (OPEN REDUCTION INTERNAL FIXATION) FRACTURE: Primary | ICD-10-CM

## 2023-11-07 DIAGNOSIS — Z98.890 S/P ORIF (OPEN REDUCTION INTERNAL FIXATION) FRACTURE: Primary | ICD-10-CM

## 2023-11-07 DIAGNOSIS — S42.201A CLOSED FRACTURE OF PROXIMAL END OF RIGHT HUMERUS, UNSPECIFIED FRACTURE MORPHOLOGY, INITIAL ENCOUNTER: ICD-10-CM

## 2023-11-07 PROCEDURE — 99024 POSTOP FOLLOW-UP VISIT: CPT | Performed by: ORTHOPAEDIC SURGERY

## 2023-11-07 PROCEDURE — 73030 X-RAY EXAM OF SHOULDER: CPT

## 2023-11-07 NOTE — PROGRESS NOTES
Orthopaedics Office Visit - Post-op Patient Visit    ASSESSMENT/PLAN:    Assessment:   13 days s/p right proximal humerus greater tuberosity fracture ORIF, DOS 10/25/23    Plan:   X-rays were performed in the office and reviewed  Sutures were removed today and steri strips were applied   Steri strips will fall off on their own  She may continue to shower normally, avoid soaking or scrubbing the incision   She will continue the use of the sling for comfort and support    She will start PT for passive and active assisted ROM exercises, no abduction, abduction protection protocol   She was advised not to actively lift her arm   Follow up in 4 weeks time with repeat x-rays     To Do Next Visit:  X-ray right shoulder     _____________________________________________________  CHIEF COMPLAINT:  Chief Complaint   Patient presents with    Right Arm - Post-op         SUBJECTIVE:  Geoff is a 72 y.o. female who presents to the office 13 days s/p right humerus fracture ORIF, DOS 10/25/23. Overall Denisha García is doing well. She has been showering normally. She notes her sutures have been getting caught on her clothing.      SOCIAL HISTORY:  Social History     Tobacco Use    Smoking status: Former     Packs/day: 0.00     Types: Cigarettes    Smokeless tobacco: Never    Tobacco comments:     no cig use as of 8/12/19 , using rare CBD oil vapor   Substance Use Topics    Alcohol use: Not Currently    Drug use: Yes     Types: Marijuana     Comment: medical marijuana       MEDICATIONS:    Current Outpatient Medications:     acetaminophen (TYLENOL) 500 mg tablet, Take 1,000 mg by mouth every 6 (six) hours as needed for mild pain, Disp: , Rfl:     Ascorbic Acid (VITAMIN C) 100 MG tablet, Take 100 mg by mouth daily, Disp: , Rfl:     b complex vitamins tablet, Take 1 tablet by mouth daily, Disp: , Rfl:     cholecalciferol (VITAMIN D3) 400 units tablet, Take 400 Units by mouth daily, Disp: , Rfl:     fluticasone (FLONASE) 50 mcg/act nasal spray, 1 spray into each nostril daily, Disp: , Rfl:     glucosamine-chondroitin 500-400 MG tablet, Take 1 tablet by mouth 2 (two) times a day , Disp: , Rfl:     loratadine (CLARITIN) 10 mg tablet, Take 10 mg by mouth daily, Disp: , Rfl:     Multiple Vitamins-Minerals (MULTIVITAMIN WITH MINERALS) tablet, Take 1 tablet by mouth daily, Disp: , Rfl:     oxyCODONE (Roxicodone) 5 immediate release tablet, Take 1 tablet (5 mg total) by mouth every 4 (four) hours as needed for moderate pain Max Daily Amount: 30 mg, Disp: 30 tablet, Rfl: 0    vitamin E 100 UNIT capsule, Take 100 Units by mouth daily, Disp: , Rfl:     REVIEW OF SYSTEMS:  MSK: as noted in HPI  Neuro: WNL's   Pertinent items are otherwise noted in HPI. A comprehensive review of systems was otherwise negative.    _____________________________________________________  PHYSICAL EXAMINATION:  Vital signs: BP (!) 192/77   Pulse 85   Ht 5' 3" (1.6 m)   Wt 71.7 kg (158 lb)   BMI 27.99 kg/m²   General: No acute distress, awake and alert  Psychiatric: Mood and affect appear appropriate  HEENT: Trachea Midline, No torticollis, no apparent facial trauma  Cardiovascular: No audible murmurs; Extremities appear perfused  Pulmonary: No audible wheezing or stridor  Skin: No open lesions; see further details (if any) below    MUSCULOSKELETAL EXAMINATION:    Extremities:  Right shoulder     No erythema, ecchymosis or edema  Incision is clean and dry with sutures intact   Presents in a sling   Able to wiggle all digits   Full composite fist   Extremity appears warm and well perfused     _____________________________________________________  STUDIES REVIEWED:  I personally reviewed the images and interpretation is as follows:  X-rays of the right shoulder demonstrate ORIF right humerus proximal tuberosity fracture with orthopedic hardware in good alignment and position. No signs of loosening or failure.        PROCEDURES PERFORMED:  Procedures    Scribe Attestation I,:  Nicolle Fitch am acting as a scribe while in the presence of the attending physician.:       I,:  Jarvis Fernando MD personally performed the services described in this documentation    as scribed in my presence.:

## 2023-11-09 ENCOUNTER — EVALUATION (OUTPATIENT)
Dept: PHYSICAL THERAPY | Facility: CLINIC | Age: 73
End: 2023-11-09
Payer: COMMERCIAL

## 2023-11-09 DIAGNOSIS — Z98.890 S/P ORIF (OPEN REDUCTION INTERNAL FIXATION) FRACTURE: Primary | ICD-10-CM

## 2023-11-09 DIAGNOSIS — Z87.81 S/P ORIF (OPEN REDUCTION INTERNAL FIXATION) FRACTURE: Primary | ICD-10-CM

## 2023-11-09 PROCEDURE — 97110 THERAPEUTIC EXERCISES: CPT | Performed by: PHYSICAL THERAPIST

## 2023-11-09 PROCEDURE — 97161 PT EVAL LOW COMPLEX 20 MIN: CPT | Performed by: PHYSICAL THERAPIST

## 2023-11-09 NOTE — PROGRESS NOTES
PT Evaluation     Today's date: 2023  Patient name: Meme Washington  : 1950  MRN: [id number]  Referring provider: Gabe Sauceda MD  Dx:   Encounter Diagnosis     ICD-10-CM    1. S/P ORIF (open reduction internal fixation) fracture  Z98.890 Ambulatory Referral to Physical Therapy    Z87.81                      Assessment  Assessment details: Patient was provided a home exercise program and demonstrated an understanding of exercises. Patient was advised to stop performing home exercise program if symptoms increase or new complaints developed. Verbal understanding demonstrated regarding home exercise program instructions. Patient would benefit from skilled physical therapy services for prescribed exercises, manual interventions, neuromuscular re-education, education, and modalities as deemed appropriate to assist patient in achieving their maximum level of function. Patient presents 15 days post-op right shoulder ORIF after fall in kitchen 10-12-23 where she also sustained dislocation per ED reports. Patient presents wearing sling - NWB Right per MD orders. Evaluation reveals:  hypersensitivity to gentle palpation right shoulder, (+) m. Guarding/ tightness right UT/ scapular borders/ cervical mm R ( likely due to injury (+) immobilization )   She presents with loss of PROM all planes (+) end range pain, weakness right ue, guarded posturing, functional cervical AROM all planes with end range discomfort produced with rotation right. She appears motivated to reduce/ abolish her pain/ symptoms  - restore her full/ functional movements and return to prior level ( pre-fall )     Impairments: abnormal or restricted ROM, abnormal movement, activity intolerance, impaired physical strength, lacks appropriate home exercise program, pain with function and poor posture   Understanding of Dx/Px/POC: good   Prognosis: good    Goals  STG  2-4 weeks   1.   Decrease pain by at least two subjective ratings in 4 weeks  2. Continue to adhere to sling use/ weight bearing restrictions per MD  2-4 weeks. LTG  4-8 weeks  1. Independent with HEP  2. Progress to AA - AROM right shoulder as ordered by MD - with restoration of full ROM all planes when appropriate  3. Abolished TTP right shoulder as well as along myofascial tissue cervical / scap border right. Plan  Patient would benefit from: skilled PT  Planned modality interventions: cryotherapy and thermotherapy: hydrocollator packs  Planned therapy interventions: flexibility, graded exercise, home exercise program, therapeutic exercise, strengthening, stretching, patient education, neuromuscular re-education, manual therapy and IADL retraining  Frequency: 2x week  Plan of Care expiration date: 2024  Treatment plan discussed with: patient    Subjective Evaluation    History of Present Illness  Mechanism of injury: On  - she fell in kitchen " somehow" and dislocated her right shoulder. She was relocated in the ED  10-25-23   she underwent R ORIF due to greater tuberosity humeral fx. She is not driving  (+) nighttime pain .   She walks generally with walking stick     She c/o right anterior shoulder pain / right UT - cervical pain since being immobilized   Patient Goals  Patient goals for therapy: decreased pain, increased motion, independence with ADLs/IADLs and return to sport/leisure activities    Pain  Current pain ratin  Quality: dull ache  Relieving factors: support  Progression: no change    Social Support  Lives with: spouse    Hand dominance: right        Objective     Postural Observations    Additional Postural Observation Details  Guarded right UE posturing in/ out of sling    Observations     Additional Observation Details  Incision with steri-strips intact, (-) drainage,  (-) redness, (-) warmeth      Palpation     Right   Tenderness of the anterior deltoid, biceps, levator scapulae, middle trapezius and upper trapezius. Tenderness     Additional Tenderness Details  Hypersensitive to gentle palpation right shoulder - anterior/ lateral / posterior  - into biceps as well as distal biceps near insertion. Cervical/Thoracic Screen   Cervical range of motion within normal limits with the following exceptions: Full cervical movements all planes with mild limitation with rotation right with mild discomfort reported.      Neurological Testing     Sensation     Shoulder   Left Shoulder   Intact: light touch    Right Shoulder   Intact: Light touch    Active Range of Motion   Left Shoulder   Normal active range of motion    Additional Active Range of Motion Details  R- NA    Passive Range of Motion     Right Shoulder   Flexion: 85 degrees with pain  External rotation 0°: 8 degrees with pain  Internal rotation 0°: 45 degrees with pain    Additional Passive Range of Motion Details  Abduction - NA    Strength/Myotome Testing     Left Shoulder   Normal muscle strength    Additional Strength Details  R- NA           Precautions: s/p R shoulder ORIF 10-25-23  NWB in sling   MD orders:  She will continue the use of the sling for comfort and support    She will start PT for passive and active assisted ROM exercises, no abduction, abduction protection protocol   She was advised not to actively lift her arm     POC expires Unit limit Auth Expiration date PT/OT/ST + Visit Limit?   1/9/23 bomn pending bomn                           Visit/Unit Tracking  AUTH Status:  Date 11/9              Pending  Used 1               Remaining                      Manuals 11/9                         PROM Right shoulder NO ABDUCTION Db  gentle            AAROM Right elbow flex/ ext Db                          Stm - gentle right UT/ scap border                          Neuro Re-Ed                          Back rolls/ scap retract                                                                              Ther Ex                          Pendulums -flex/ext P!                                                                                           Ther Activity                                       Gait Training                                       Modalities             MHP Cerv/ UT 6'             Cp right shoulder  prn

## 2023-11-15 ENCOUNTER — OFFICE VISIT (OUTPATIENT)
Dept: PHYSICAL THERAPY | Facility: CLINIC | Age: 73
End: 2023-11-15
Payer: COMMERCIAL

## 2023-11-15 DIAGNOSIS — Z87.81 S/P ORIF (OPEN REDUCTION INTERNAL FIXATION) FRACTURE: Primary | ICD-10-CM

## 2023-11-15 DIAGNOSIS — Z98.890 S/P ORIF (OPEN REDUCTION INTERNAL FIXATION) FRACTURE: Primary | ICD-10-CM

## 2023-11-15 PROCEDURE — 97110 THERAPEUTIC EXERCISES: CPT

## 2023-11-15 PROCEDURE — 97140 MANUAL THERAPY 1/> REGIONS: CPT

## 2023-11-15 NOTE — PROGRESS NOTES
Daily Note     Today's date: 11/15/2023  Patient name: Mayank Gottlieb  : 1950  MRN: [id number]  Referring provider: Blayne Horne MD  Dx:   Encounter Diagnosis     ICD-10-CM    1. S/P ORIF (open reduction internal fixation) fracture  Z98.890     Z87.81                      Subjective: Pt reports that she is doing okay today with no new complaints. Notes that she has been doing her pendulums at home. She was able to shower this morning for the first time in awhile. Objective: See treatment diary below      Assessment: Tolerated treatment fair. She was able to perform all exercises this session in a pain free range. Tightness was present in her shoulder and elbow today with minimal progress in ROM noted. Patient demonstrated fatigue post treatment, exhibited good technique with therapeutic exercises, and would benefit from continued PT      Plan: Continue per plan of care.       Precautions: s/p R shoulder ORIF 10-25-23  NWB in sling   MD orders:  She will continue the use of the sling for comfort and support    She will start PT for passive and active assisted ROM exercises, no abduction, abduction protection protocol   She was advised not to actively lift her arm     POC expires Unit limit Auth Expiration date PT/OT/ST + Visit Limit?   23 bomn pending bomn                           Visit/Unit Tracking  AUTH Status:  Date 11/9 11/15             Pending  Used 1 2              Remaining                      Manuals 11/9 11/15                        PROM Right shoulder NO ABDUCTION Db  gentle MM gentle           AAROM Right elbow flex/ ext Db  MM                        Stm - gentle right UT/ scap border  MM                        Neuro Re-Ed                          Back rolls/ scap retract  20x ea                                                                            Ther Ex                          Pendulums -flex/ext P! 20x ea Ther Activity                                       Gait Training                                       Modalities             MHP Cerv/ UT 6'  6'           Cp right shoulder  prn deferred

## 2023-11-17 ENCOUNTER — OFFICE VISIT (OUTPATIENT)
Dept: PHYSICAL THERAPY | Facility: CLINIC | Age: 73
End: 2023-11-17
Payer: COMMERCIAL

## 2023-11-17 DIAGNOSIS — Z98.890 S/P ORIF (OPEN REDUCTION INTERNAL FIXATION) FRACTURE: Primary | ICD-10-CM

## 2023-11-17 DIAGNOSIS — Z87.81 S/P ORIF (OPEN REDUCTION INTERNAL FIXATION) FRACTURE: Primary | ICD-10-CM

## 2023-11-17 PROCEDURE — 97110 THERAPEUTIC EXERCISES: CPT

## 2023-11-17 PROCEDURE — 97140 MANUAL THERAPY 1/> REGIONS: CPT

## 2023-11-17 NOTE — PROGRESS NOTES
Daily Note     Today's date: 2023  Patient name: Mayank Gottlieb  : 1950  MRN: [id number]  Referring provider: Blayne Horne MD  Dx:   Encounter Diagnosis     ICD-10-CM    1. S/P ORIF (open reduction internal fixation) fracture  Z98.890     Z87.81           Start Time: 1113  Stop Time: 1153  Total time in clinic (min): 40 minutes    Subjective: Patient reports no new complaints. Patient stated that she has been actively using RUE. Objective: See treatment diary below      Assessment: Fair tolerance to session today. Evaluating therapist spoke to patient and verbalized her protocol and how she is not to use RUE or carry anything heavy in her right hand. Pt verbalized understanding. Pt will benefit from continued skilled PT intervention in order to address remaining limitations and to restore maximal function. No increased pain reported post treatment. Plan: Continue per plan of care.       Precautions: s/p R shoulder ORIF 10-25-23  NWB in sling   MD orders:  She will continue the use of the sling for comfort and support    She will start PT for passive and active assisted ROM exercises, no abduction, abduction protection protocol   She was advised not to actively lift her arm     POC expires Unit limit Auth Expiration date PT/OT/ST + Visit Limit?   23 bomn pending bomn                           Visit/Unit Tracking  AUTH Status:  Date 11/9 11/15 11/17            Pending  Used 1 2 3             Remaining                      Manuals 11/9 11/15 1/17                       PROM Right shoulder NO ABDUCTION Db  gentle MM gentle JG gentle no abd          AAROM Right elbow flex/ ext Db  MM JG                       Stm - gentle right UT/ scap border  MM JG                       Neuro Re-Ed                          Back rolls/ scap retract  20x ea 20x                                                                           Ther Ex                          Pendulums -flex/ext P! 20x ea 20x Ther Activity                                       Gait Training                                       Modalities             MHP Cerv/ UT 6'  6' 6'          Cp right shoulder  prn deferred deferred

## 2023-11-21 ENCOUNTER — APPOINTMENT (OUTPATIENT)
Dept: PHYSICAL THERAPY | Facility: CLINIC | Age: 73
End: 2023-11-21
Payer: COMMERCIAL

## 2023-11-22 ENCOUNTER — OFFICE VISIT (OUTPATIENT)
Dept: PHYSICAL THERAPY | Facility: CLINIC | Age: 73
End: 2023-11-22
Payer: COMMERCIAL

## 2023-11-22 DIAGNOSIS — Z98.890 S/P ORIF (OPEN REDUCTION INTERNAL FIXATION) FRACTURE: Primary | ICD-10-CM

## 2023-11-22 DIAGNOSIS — Z87.81 S/P ORIF (OPEN REDUCTION INTERNAL FIXATION) FRACTURE: Primary | ICD-10-CM

## 2023-11-22 PROCEDURE — 97140 MANUAL THERAPY 1/> REGIONS: CPT

## 2023-11-22 PROCEDURE — 97110 THERAPEUTIC EXERCISES: CPT

## 2023-11-22 NOTE — PROGRESS NOTES
Daily Note     Today's date: 2023  Patient name: Omar Dubin  : 1950  MRN: [id number]  Referring provider: Chevy Garcia MD  Dx:   Encounter Diagnosis     ICD-10-CM    1. S/P ORIF (open reduction internal fixation) fracture  Z98.890     Z87.81                      Subjective: Patient indicated discomfort with any active/passive ROM of right UE. Objective: See treatment diary below      Assessment: Tolerated treatment  with cues to remind patient of abduction precautions . Guarding evident with PROM and active TE. Patient would benefit from continued PT      Plan: Continue per plan of care.       Precautions: s/p R shoulder ORIF 10-25-23  NWB in sling   MD orders:  She will continue the use of the sling for comfort and support    She will start PT for passive and active assisted ROM exercises, no abduction, abduction protection protocol   She was advised not to actively lift her arm     POC expires Unit limit Auth Expiration date PT/OT/ST + Visit Limit?   23 bomn pending bomn                           Visit/Unit Tracking  AUTH Status:  Date 11/9 11/15 11/17 11/22           Pending  Used 1 2 3 4            Remaining     FOTO                 Manuals 11/9 11/15 1/17 11/22                      PROM Right shoulder NO ABDUCTION Db  gentle MM gentle JG gentle no abd LA  Gentle  No ABD         AAROM Right elbow flex/ ext Db  MM JG LA                      Stm - gentle right UT/ scap border  MM JG LA                      Neuro Re-Ed                          Back rolls/ scap retract  20x ea 20x 20x each                                                                          Ther Ex                          Pendulums -flex/ext P! 20x ea 20x 20x                                                                                         Ther Activity                                       Gait Training                                       Modalities             MHP Cerv/ UT 6'  6' 6' 10'         Cp right shoulder  prn deferred deferred

## 2023-11-24 ENCOUNTER — OFFICE VISIT (OUTPATIENT)
Dept: PHYSICAL THERAPY | Facility: CLINIC | Age: 73
End: 2023-11-24
Payer: COMMERCIAL

## 2023-11-24 DIAGNOSIS — Z98.890 S/P ORIF (OPEN REDUCTION INTERNAL FIXATION) FRACTURE: Primary | ICD-10-CM

## 2023-11-24 DIAGNOSIS — Z87.81 S/P ORIF (OPEN REDUCTION INTERNAL FIXATION) FRACTURE: Primary | ICD-10-CM

## 2023-11-24 PROCEDURE — 97140 MANUAL THERAPY 1/> REGIONS: CPT

## 2023-11-24 PROCEDURE — 97110 THERAPEUTIC EXERCISES: CPT

## 2023-11-24 NOTE — PROGRESS NOTES
Daily Note     Today's date: 2023  Patient name: Davida Carrillo  : 1950  MRN: [id number]  Referring provider: Francis Paulino MD  Dx:   Encounter Diagnosis     ICD-10-CM    1. S/P ORIF (open reduction internal fixation) fracture  Z98.890     Z87.81                      Subjective: SPR=6.5/10. Objective: See treatment diary below      Assessment: Tolerated treatment  with review of precautions and good understanding via verbal feedback . Patient demonstrated fatigue post treatment and would benefit from continued PT      Plan: Continue per plan of care.       Precautions: s/p R shoulder ORIF 10-25-23  NWB in sling   MD orders:  She will continue the use of the sling for comfort and support    She will start PT for passive and active assisted ROM exercises, no abduction, abduction protection protocol   She was advised not to actively lift her arm     POC expires Unit limit Auth Expiration date PT/OT/ST + Visit Limit?   23 bomn pending bomn                           Visit/Unit Tracking  AUTH Status:  Date 11/9 11/15 11/17 11/22 11/24          Pending  Used 1 2 3 4 5           Remaining     FOTO                 Manuals 11/9 11/15 1/17 11/22 11/24                     PROM Right shoulder NO ABDUCTION Db  gentle MM gentle JG gentle no abd LA  Gentle  No ABD LA  Gentle no abd        AAROM Right elbow flex/ ext Db  MM JG LA LA                     Stm - gentle right UT/ scap border  MM JG LA L                     Neuro Re-Ed                          Back rolls/ scap retract  20x ea 20x 20x each 20x each                                                                         Ther Ex                          Pendulums -flex/ext P! 20x ea 20x 20x 20x each                                                                                        Ther Activity                                       Gait Training                                       Modalities             MHP Cerv/ UT 6'  6' 6' 10' 10' Cp right shoulder  prn deferred deferred

## 2023-11-29 ENCOUNTER — OFFICE VISIT (OUTPATIENT)
Dept: PHYSICAL THERAPY | Facility: CLINIC | Age: 73
End: 2023-11-29
Payer: COMMERCIAL

## 2023-11-29 DIAGNOSIS — Z98.890 S/P ORIF (OPEN REDUCTION INTERNAL FIXATION) FRACTURE: Primary | ICD-10-CM

## 2023-11-29 DIAGNOSIS — Z87.81 S/P ORIF (OPEN REDUCTION INTERNAL FIXATION) FRACTURE: Primary | ICD-10-CM

## 2023-11-29 PROCEDURE — 97110 THERAPEUTIC EXERCISES: CPT | Performed by: PHYSICAL THERAPIST

## 2023-11-29 PROCEDURE — 97112 NEUROMUSCULAR REEDUCATION: CPT | Performed by: PHYSICAL THERAPIST

## 2023-11-29 NOTE — PROGRESS NOTES
Daily Note     Today's date: 2023  Patient name: Kaci Vergara  : 1950  MRN: [id number]  Referring provider: Aileen Martin MD  Dx:   Encounter Diagnosis     ICD-10-CM    1. S/P ORIF (open reduction internal fixation) fracture  Z98.890     Z87.81                      Subjective: patient notes that stretching "hurts"   states that she is still using her sling as prescribed   "not too bad this am"  when asked pain scale. Notes feeling better post session   'looser"       Objective: See treatment diary below      Assessment: Tolerated treatment fair overall   patient encouraged to work on right elbow extension more as she remains very tight    able to improve extension A/AAROM after manual work . Plan: Continue per plan of care. MD follow up 23   reassess prior to this session.         Precautions: s/p R shoulder ORIF 10-25-23  NWB in sling   MD orders:  She will continue the use of the sling for comfort and support    She will start PT for passive and active assisted ROM exercises, no abduction, abduction protection protocol   She was advised not to actively lift her arm     POC expires Unit limit Auth Expiration date PT/OT/ST + Visit Limit?   23 bomn pending bomn                           Visit/Unit Tracking  AUTH Status:  Date 11/9 11/15 11/17 11/22 11/24 11/ 29         Pending  Used 1 2 3 4 5 6          Remaining     FOTO                 Manuals 11/9 11/15 1/17 11/22 11/24 11/29                    PROM Right shoulder NO ABDUCTION Db  gentle MM gentle JG gentle no abd LA  Gentle  No ABD LA  Gentle no abd db       AAROM Right elbow flex/ ext Db  MM JG LA LA db                    Stm - gentle right UT/ scap border  MM JG LA LA                     Neuro Re-Ed                          Back rolls/ scap retract  20x ea 20x 20x each 20x each 20x ea        Elbow flexion / extension       20x ea                                                           Ther Ex             Pulleys       3 min       Pendulums -flex/ext/ ab/add, circles  P! 20x ea 20x 20x 20x each 1 min ea        Supine cane cp, flexion       20 w/ some PT assist       Wall slides flex             Ladder walks                                                      Ther Activity                                       Gait Training                                       Modalities             MHP Cerv/ UT 6'  6' 6' 10' 10' 10'        Cp right shoulder  prn deferred deferred

## 2023-12-01 ENCOUNTER — OFFICE VISIT (OUTPATIENT)
Dept: PHYSICAL THERAPY | Facility: CLINIC | Age: 73
End: 2023-12-01
Payer: COMMERCIAL

## 2023-12-01 DIAGNOSIS — Z87.81 S/P ORIF (OPEN REDUCTION INTERNAL FIXATION) FRACTURE: Primary | ICD-10-CM

## 2023-12-01 DIAGNOSIS — Z98.890 S/P ORIF (OPEN REDUCTION INTERNAL FIXATION) FRACTURE: Primary | ICD-10-CM

## 2023-12-01 PROCEDURE — 97112 NEUROMUSCULAR REEDUCATION: CPT | Performed by: PHYSICAL THERAPIST

## 2023-12-01 PROCEDURE — 97110 THERAPEUTIC EXERCISES: CPT | Performed by: PHYSICAL THERAPIST

## 2023-12-01 NOTE — PROGRESS NOTES
Daily Note     Today's date: 2023  Patient name: Vickie Newton  : 1950  MRN: [id number]  Referring provider: Vivi Clark MD  Dx:   Encounter Diagnosis     ICD-10-CM    1. S/P ORIF (open reduction internal fixation) fracture  Z98.890     Z87.81                      Subjective: patient reports that she is trying to straighten her right elbow more at home - feels good , some soreness overall. Objective: See treatment diary below      Assessment: Tolerated treatment fair overall    AA movements improving    Plan: Continue per plan of care. MD follow up 23   reassess prior to this session.         Precautions: s/p R shoulder ORIF 10-25-23  NWB in sling   MD orders:  She will continue the use of the sling for comfort and support    She will start PT for passive and active assisted ROM exercises, no abduction, abduction protection protocol   She was advised not to actively lift her arm     POC expires Unit limit Auth Expiration date PT/OT/ST + Visit Limit?   23 bomn pending bomn                           Visit/Unit Tracking  AUTH Status:  Date 11/9 11/15 11/17 11/22 11/24 11/ 29 12/1        Pending  Used 1 2 3 4 5 6 7         Remaining     FOTO                 Manuals 11/9 11/15 1/17 11/22 11/24 11/29 12/1                   PROM Right shoulder NO ABDUCTION Db  gentle MM gentle JG gentle no abd LA  Gentle  No ABD LA  Gentle no abd db db      AAROM Right elbow flex/ ext Db  MM JG LA LA db db                   Stm - gentle right UT/ scap border  MM JG LA LA                     Neuro Re-Ed                          Back rolls/ scap retract  20x ea 20x 20x each 20x each 20x ea  20x ea      Elbow flexion / extension       20x ea 20x                                                           Ther Ex             Pulleys       3 min 5 min      Pendulums -flex/ext/ ab/add, circles  P! 20x ea 20x 20x 20x each 1 min ea  1 min ea       Supine cane cp, flexion       20 w/ some PT assist       Wall slides flex       10s x 10       Ladder walks       5 x       Aa flexion in supine       20x         Aa abd, er/ ir       20x                    Ther Activity                                       Gait Training                                       Modalities             MHP Cerv/ UT 6'  6' 6' 10' 10' 10'  10'       Cp right shoulder  prn deferred deferred

## 2023-12-04 ENCOUNTER — OFFICE VISIT (OUTPATIENT)
Dept: PHYSICAL THERAPY | Facility: CLINIC | Age: 73
End: 2023-12-04
Payer: COMMERCIAL

## 2023-12-04 DIAGNOSIS — Z87.81 S/P ORIF (OPEN REDUCTION INTERNAL FIXATION) FRACTURE: Primary | ICD-10-CM

## 2023-12-04 DIAGNOSIS — Z98.890 S/P ORIF (OPEN REDUCTION INTERNAL FIXATION) FRACTURE: Primary | ICD-10-CM

## 2023-12-04 PROCEDURE — 97110 THERAPEUTIC EXERCISES: CPT | Performed by: PHYSICAL THERAPIST

## 2023-12-04 PROCEDURE — 97112 NEUROMUSCULAR REEDUCATION: CPT | Performed by: PHYSICAL THERAPIST

## 2023-12-04 NOTE — PROGRESS NOTES
PT Re-Evaluation     Today's date: 2023  Patient name: Yuan Lui  : 1950  MRN: [id number]  Referring provider: Jaden Chacko MD  Dx:   Encounter Diagnosis     ICD-10-CM    1. S/P ORIF (open reduction internal fixation) fracture  Z98.890     Z87.81                      Assessment  Assessment details: Patient is doing well overall in PT   she has been able to progress into AA movements with greater control and muscle activation ( avoiding abd per MD orders )     Please reassess and advise to continuation of services. Thank you       Impairments: abnormal or restricted ROM, abnormal movement, activity intolerance, impaired physical strength, lacks appropriate home exercise program and pain with function  Understanding of Dx/Px/POC: good   Prognosis: good    Goals  STG  2-4 weeks   1. Decrease pain by at least two subjective ratings in 4 weeks  MET  2. Continue to adhere to sling use/ weight bearing restrictions per MD  2-4 weeks. MET     LTG  4-8 weeks  1. Independent with HEP  ONGOING  2. Progress to AA - AROM right shoulder as ordered by MD - with restoration of full ROM all planes when appropriate  ONGOING  3. Abolished TTP right shoulder as well as along myofascial tissue cervical / scap border right.    Partially MET        Plan  Patient would benefit from: skilled PT  Planned modality interventions: cryotherapy and thermotherapy: hydrocollator packs  Planned therapy interventions: flexibility, graded exercise, home exercise program, therapeutic exercise, strengthening, stretching, patient education, neuromuscular re-education, manual therapy and IADL retraining  Frequency: 2x week  Plan of Care expiration date: 2024  Treatment plan discussed with: patient    Subjective Evaluation    Patient Goals  Patient goals for therapy: decreased pain, increased motion, independence with ADLs/IADLs and return to sport/leisure activities    Pain  Current pain ratin  Quality: dull ache  Relieving factors: support  Progression: improved    Social Support  Lives with: spouse    Hand dominance: right        Objective     Postural Observations    Additional Postural Observation Details  Guarded right UE posturing in/ out of sling- improving with cueing to let arm swing free. Palpation     Right   Tenderness of the anterior deltoid, biceps, levator scapulae, middle trapezius and upper trapezius. Cervical/Thoracic Screen   Cervical range of motion within normal limits with the following exceptions: Full cervical movements all planes with mild limitation with rotation right with mild discomfort reported.      Neurological Testing     Sensation     Shoulder   Left Shoulder   Intact: light touch    Right Shoulder   Intact: Light touch    Active Range of Motion   Left Shoulder   Normal active range of motion    Right Shoulder   Flexion: 80 degrees   Abduction: 40 degrees     Additional Active Range of Motion Details  R- NA    Passive Range of Motion     Right Shoulder   Flexion: 95 degrees with pain  Abduction: 85 degrees with pain  External rotation 0°: 5 degrees with pain  Internal rotation 0°: WFL    Additional Passive Range of Motion Details  Abduction - NA    Strength/Myotome Testing     Left Shoulder   Normal muscle strength    Additional Strength Details  R- NA           Precautions: s/p R shoulder ORIF 10-25-23  NWB in sling   MD orders:  She will continue the use of the sling for comfort and support    She will start PT for passive and active assisted ROM exercises, no abduction, abduction protection protocol   She was advised not to actively lift her arm     POC expires Unit limit Auth Expiration date PT/OT/ST + Visit Limit?   1/9/23 bomn pending bomn                           Visit/Unit Tracking  AUTH Status:  Date 11/9 11/15 11/17 11/22 11/24 11/ 29 12/1 12/4       Pending  Used 1 2 3 4 5 6 7 8        Remaining     FOTO                 Manuals 11/9 11/15 1/17 11/22 11/24 11/29 12/1 12/4                  PROM Right shoulder NO ABDUCTION Db  gentle MM gentle JG gentle no abd LA  Gentle  No ABD LA  Gentle no abd db db db     AAROM Right elbow flex/ ext Db  MM JG LA LA db db db                  Stm - gentle right UT/ scap border  MM JG LA LA                     Neuro Re-Ed                          Back rolls/ scap retract  20x ea 20x 20x each 20x each 20x ea  20x ea 20x ea     Elbow flexion / extension       20x ea 20x  20x                                                          Ther Ex             Pulleys       3 min 5 min 5 min     Pendulums -flex/ext/ ab/add, circles  P! 20x ea 20x 20x 20x each 1 min ea  1 min ea  1 min ea     Supine cane cp, flexion       20 w/ some PT assist  Active x 15 ea      Seated ff w/ ball        10s x 10      Wall slides flex       10s x 10  10s x 10      Ladder walks       5 x  10x      Aa flexion in supine       20x  20x       Aa abd, er/ ir       20x  20x                  Ther Activity                                       Gait Training                                       Modalities             MHP Cerv/ UT 6'  6' 6' 10' 10' 10'  10'  10'     Cp right shoulder  prn deferred deferred

## 2023-12-05 ENCOUNTER — OFFICE VISIT (OUTPATIENT)
Dept: OBGYN CLINIC | Facility: CLINIC | Age: 73
End: 2023-12-05

## 2023-12-05 ENCOUNTER — APPOINTMENT (OUTPATIENT)
Dept: RADIOLOGY | Facility: CLINIC | Age: 73
End: 2023-12-05
Payer: COMMERCIAL

## 2023-12-05 VITALS
WEIGHT: 158.3 LBS | HEIGHT: 63 IN | DIASTOLIC BLOOD PRESSURE: 85 MMHG | SYSTOLIC BLOOD PRESSURE: 182 MMHG | HEART RATE: 85 BPM | BODY MASS INDEX: 28.05 KG/M2

## 2023-12-05 DIAGNOSIS — Z98.890 S/P ORIF (OPEN REDUCTION INTERNAL FIXATION) FRACTURE: ICD-10-CM

## 2023-12-05 DIAGNOSIS — Z87.81 S/P ORIF (OPEN REDUCTION INTERNAL FIXATION) FRACTURE: ICD-10-CM

## 2023-12-05 DIAGNOSIS — S42.201A CLOSED FRACTURE OF PROXIMAL END OF RIGHT HUMERUS, UNSPECIFIED FRACTURE MORPHOLOGY, INITIAL ENCOUNTER: Primary | ICD-10-CM

## 2023-12-05 PROCEDURE — 73030 X-RAY EXAM OF SHOULDER: CPT

## 2023-12-05 PROCEDURE — 99024 POSTOP FOLLOW-UP VISIT: CPT | Performed by: ORTHOPAEDIC SURGERY

## 2023-12-05 NOTE — PROGRESS NOTES
Orthopaedics Office Visit - Post-op Patient Visit    ASSESSMENT/PLAN:    Assessment:   6 weeks status post right proximal humerus greater tuberosity fracture ORIF, DOS 10/25/2023  Doing well  Did not discontinue sling as instructed at 2 wk visit    Plan:   New x-rays were performed in office and reviewed with patient and patient's  at today's visit. New physical therapy referral was given to patient to work on overhead range of motion and strengthening. Physical therapy may also work with patient to discontinue sling usage and initiate weightbearing activities. Patient may initiate weightbearing activities of right upper extremity. Patient may discontinue sling. Continue Tylenol as needed for pain control. Patient will follow-up in 6 weeks for repeat x-rays and reevaluation. To Do Next Visit:  Repeat right shoulder x-rays and reevaluation    _____________________________________________________  CHIEF COMPLAINT:  Chief Complaint   Patient presents with    Right Shoulder - Post-op         SUBJECTIVE:  Luh Lawrence is a 72 y.o. female who presents 6 weeks s/p right humerus fracture ORIF, DOS 10/25/2023. Overall Zuleyma Leiva is doing well. Patient states she has been able to go to physical therapy to work on range of motion and states she continues to get better at each visit. Patient states she continues to use a sling at all times and states that she does not remember being told to discontinue sling. Patient states she only takes Tylenol as needed for pain. Patient and patient's  has been overall pleased with her progress.       SOCIAL HISTORY:  Social History     Tobacco Use    Smoking status: Former     Packs/day: 0.00     Types: Cigarettes    Smokeless tobacco: Never    Tobacco comments:     no cig use as of 8/12/19 , using rare CBD oil vapor   Substance Use Topics    Alcohol use: Not Currently    Drug use: Yes     Types: Marijuana     Comment: medical marijuana MEDICATIONS:    Current Outpatient Medications:     acetaminophen (TYLENOL) 500 mg tablet, Take 1,000 mg by mouth every 6 (six) hours as needed for mild pain, Disp: , Rfl:     b complex vitamins tablet, Take 1 tablet by mouth daily, Disp: , Rfl:     cholecalciferol (VITAMIN D3) 400 units tablet, Take 400 Units by mouth daily, Disp: , Rfl:     fluticasone (FLONASE) 50 mcg/act nasal spray, 1 spray into each nostril daily, Disp: , Rfl:     glucosamine-chondroitin 500-400 MG tablet, Take 1 tablet by mouth 2 (two) times a day , Disp: , Rfl:     loratadine (CLARITIN) 10 mg tablet, Take 10 mg by mouth daily, Disp: , Rfl:     Multiple Vitamins-Minerals (MULTIVITAMIN WITH MINERALS) tablet, Take 1 tablet by mouth daily, Disp: , Rfl:     vitamin E 100 UNIT capsule, Take 100 Units by mouth daily, Disp: , Rfl:     Ascorbic Acid (VITAMIN C) 100 MG tablet, Take 100 mg by mouth daily, Disp: , Rfl:     oxyCODONE (Roxicodone) 5 immediate release tablet, Take 1 tablet (5 mg total) by mouth every 4 (four) hours as needed for moderate pain Max Daily Amount: 30 mg, Disp: 30 tablet, Rfl: 0    REVIEW OF SYSTEMS:  MSK: noted in HPI  Neuro: WNL  Pertinent items are otherwise noted in HPI. A comprehensive review of systems was otherwise negative.    _____________________________________________________  PHYSICAL EXAMINATION:  Vital signs: BP (!) 182/85   Pulse 85   Ht 5' 3" (1.6 m)   Wt 71.8 kg (158 lb 4.8 oz)   BMI 28.04 kg/m²   General: No acute distress, awake and alert  Psychiatric: Mood and affect appear appropriate  HEENT: Trachea Midline, No torticollis, no apparent facial trauma  Cardiovascular: No audible murmurs;  Extremities appear perfused  Pulmonary: No audible wheezing or stridor  Skin: No open lesions; see further details (if any) below    MUSCULOSKELETAL EXAMINATION:  Extremities:      Right shoulder:  No erythema, ecchymosis or edema  Incision is clean and dry with sutures intact   Able to wiggle all digits   Full composite fist   Extremity appears warm and well perfused   Able to actively perform shoulder flexion to 70 degrees without hiking    _____________________________________________________  STUDIES REVIEWED:  I personally reviewed the images and interpretation is as follows:  X-rays of the right shoulder demonstrate ORIF right humerus proximal tuberosity fracture with orthopedic hardware in good alignment and position. No signs of loosening or failure.       PROCEDURES PERFORMED:  Procedures    Scribe Attestation      I,:  Lester Rosenbaum am acting as a scribe while in the presence of the attending physician.:       I,:  Leon Singh MD personally performed the services described in this documentation    as scribed in my presence.:

## 2023-12-07 ENCOUNTER — OFFICE VISIT (OUTPATIENT)
Dept: PHYSICAL THERAPY | Facility: CLINIC | Age: 73
End: 2023-12-07
Payer: COMMERCIAL

## 2023-12-07 DIAGNOSIS — Z87.81 S/P ORIF (OPEN REDUCTION INTERNAL FIXATION) FRACTURE: Primary | ICD-10-CM

## 2023-12-07 DIAGNOSIS — Z98.890 S/P ORIF (OPEN REDUCTION INTERNAL FIXATION) FRACTURE: Primary | ICD-10-CM

## 2023-12-07 PROCEDURE — 97110 THERAPEUTIC EXERCISES: CPT

## 2023-12-07 PROCEDURE — 97140 MANUAL THERAPY 1/> REGIONS: CPT

## 2023-12-07 NOTE — PROGRESS NOTES
Daily Note     Today's date: 2023  Patient name: Vivi Kuo  : 1950  MRN: [id number]  Referring provider: Ban Rubalcava MD  Dx:   Encounter Diagnosis     ICD-10-CM    1. S/P ORIF (open reduction internal fixation) fracture  Z98.890     Z87.81                      Subjective: Patient indicated discomfort at end ROM of right UE as per restrictions. Objective: See treatment diary below      Assessment: Tolerated treatment  with vc's for technique, pacing  and to keep focused on tasks requested. . Patient demonstrated fatigue post treatment and would benefit from continued PT      Plan: Continue per plan of care.       Precautions: s/p R shoulder ORIF 10-25-23  NWB in sling   MD orders:  She will continue the use of the sling for comfort and support    She will start PT for passive and active assisted ROM exercises, no abduction, abduction protection protocol   She was advised not to actively lift her arm     POC expires Unit limit Auth Expiration date PT/OT/ST + Visit Limit?   23 bomn pending bomn                           Visit/Unit Tracking  AUTH Status:  Date 11/9 11/15 11/17 11/22 11/24 11/ 29 12/1 12/4 12/7      Pending  Used 1 2 3 4 5 6 7 8 9       Remaining     FOTO                 Manuals 11/9 11/15 1/17 11/22 11/24 11/29 12/1 12/4 12/7                 PROM Right shoulder NO ABDUCTION Db  gentle MM gentle JG gentle no abd LA  Gentle  No ABD LA  Gentle no abd db db db LA    AAROM Right elbow flex/ ext Db  MM JG LA LA db db db LA                 Stm - gentle right UT/ scap border  MM JG LA LA                     Neuro Re-Ed                          Back rolls/ scap retract  20x ea 20x 20x each 20x each 20x ea  20x ea 20x ea 20x each     Elbow flexion / extension       20x ea 20x  20x  20x                                                          Ther Ex             Pulleys       3 min 5 min 5 min 5'    Pendulums -flex/ext/ ab/add, circles  P! 20x ea 20x 20x 20x each 1 min ea  1 min ea  1 min ea 1' each    Supine cane cp, flexion       20 w/ some PT assist  Active x 15 ea  Flex only 17x  P!  With CP attempt    Seated ff w/ ball        10s x 10  :10  10x     Wall slides flex       10s x 10  10s x 10  :10  10x    Ladder walks       5 x  10x  10x    Aa flexion in supine       20x  20x 20x      Aa abd, er/ ir       20x  20x NP                 Ther Activity                                       Gait Training                                       Modalities             MHP Cerv/ UT 6'  6' 6' 10' 10' 10'  10'  10' Seated  10'    Cp right shoulder  prn deferred deferred

## 2023-12-13 ENCOUNTER — OFFICE VISIT (OUTPATIENT)
Dept: PHYSICAL THERAPY | Facility: CLINIC | Age: 73
End: 2023-12-13
Payer: COMMERCIAL

## 2023-12-13 DIAGNOSIS — Z98.890 S/P ORIF (OPEN REDUCTION INTERNAL FIXATION) FRACTURE: Primary | ICD-10-CM

## 2023-12-13 DIAGNOSIS — Z87.81 S/P ORIF (OPEN REDUCTION INTERNAL FIXATION) FRACTURE: Primary | ICD-10-CM

## 2023-12-13 PROCEDURE — 97110 THERAPEUTIC EXERCISES: CPT
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
## 2023-12-15 ENCOUNTER — OFFICE VISIT (OUTPATIENT)
Dept: PHYSICAL THERAPY | Facility: CLINIC | Age: 73
End: 2023-12-15
Payer: COMMERCIAL

## 2023-12-15 DIAGNOSIS — Z87.81 S/P ORIF (OPEN REDUCTION INTERNAL FIXATION) FRACTURE: Primary | ICD-10-CM

## 2023-12-15 DIAGNOSIS — Z98.890 S/P ORIF (OPEN REDUCTION INTERNAL FIXATION) FRACTURE: Primary | ICD-10-CM

## 2023-12-15 PROCEDURE — 97110 THERAPEUTIC EXERCISES: CPT

## 2023-12-15 PROCEDURE — 97140 MANUAL THERAPY 1/> REGIONS: CPT

## 2023-12-15 NOTE — PROGRESS NOTES
Daily Note     Today's date: 12/15/2023  Patient name: Deedee Kenny  : 1950  MRN: [id number]  Referring provider: Stefano Chauhan MD  Dx:   Encounter Diagnosis     ICD-10-CM    1. S/P ORIF (open reduction internal fixation) fracture  Z98.890     Z87.81                      Subjective: Patient presented with reported significant pain of right shoulder. Patient deferred several exercises secondary to reported elevated discomfort. 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 Patient noted decline in right UE discomfort post manual intervention. Objective: See treatment diary below      Assessment: Tolerated treatment  with lengthy discussion on benefits of working only to tolerance and adhering to physician ordered restrictions for right UE. Explained healing process and ways to promote that healing  . 100% VC's  for technique. Excessive guarding noted initially with PROM, which improved with 100% vc's. . Patient demonstrated fatigue post treatment and would benefit from continued PT      Plan: Continue per plan of care.       Precautions: s/p R shoulder ORIF 10-25-23  NWB in sling   MD orders:  She will continue the use of the sling for comfort and support    She will start PT for passive and active assisted ROM exercises, no abduction, abduction protection protocol   She was advised not to actively lift her arm     POC expires Unit limit Auth Expiration date PT/OT/ST + Visit Limit?   23 bomn pending bomn                           Visit/Unit Tracking  AUTH Status:  Date 11/9 11/15 11/17 11/22 11/24 11/ 29 12/1 12/4 12/7 12/13 12/15    Pending  Used 1 2 3 4 5 6 7 8 9 10 11     Remaining     FOTO                 Manuals 12/15   11/22 11/24 11/29 12/1 12/4 12/7 12/13                PROM Right shoulder NO ABDUCTION LA  To tolerance   LA  Gentle  No ABD LA  Gentle no abd db db db LA LA  To tolerance AAROM Right elbow flex/ ext LA   LA LA db db db LA LA                Stm - gentle right UT/ scap border    LA LA                     Neuro Re-Ed                          Back rolls/ scap retract 20x each   20x each 20x each 20x ea  20x ea 20x ea 20x each  20x each   Elbow flexion / extension  20x each     20x ea 20x  20x  20x 20x                                                          Ther Ex             Pulleys  5'     3 min 5 min 5 min 5' 5'   Pendulums -flex/ext/ ab/add, circles  1' each    20x 20x each 1 min ea  1 min ea  1 min ea 1' each 1' each   Supine cane cp, flexion  Held 2*  pain     20 w/ some PT assist  Active x 15 ea  Flex only 17x  P! With CP attempt Held today   Seated ff w/ ball :10  10x       10s x 10  :10  10x     Wall slides flex :05  10x  P!   Modified ROM      10s x 10  10s x 10  :10  10x :10  10x to tolerance   Ladder walks Pt def 2* P!      5 x  10x  10x Pt def   Aa flexion in supine Pt def  2* P!      20x  20x 20x      Aa abd, er/ ir       20x  20x NP                 Ther Activity                                       Gait Training                                       Modalities             MHP Cerv/ UT 10'   10' 10' 10'  10'  10' Seated  10'    Cp right shoulder           Seated  8'

## 2023-12-20 ENCOUNTER — APPOINTMENT (OUTPATIENT)
Dept: PHYSICAL THERAPY | Facility: CLINIC | Age: 73
End: 2023-12-20
Payer: COMMERCIAL

## 2023-12-22 ENCOUNTER — EVALUATION (OUTPATIENT)
Dept: PHYSICAL THERAPY | Facility: CLINIC | Age: 73
End: 2023-12-22
Payer: COMMERCIAL

## 2023-12-22 DIAGNOSIS — Z98.890 S/P ORIF (OPEN REDUCTION INTERNAL FIXATION) FRACTURE: Primary | ICD-10-CM

## 2023-12-22 DIAGNOSIS — Z87.81 S/P ORIF (OPEN REDUCTION INTERNAL FIXATION) FRACTURE: Primary | ICD-10-CM

## 2023-12-22 PROCEDURE — 97110 THERAPEUTIC EXERCISES: CPT | Performed by: PHYSICAL THERAPIST

## 2023-12-22 NOTE — PROGRESS NOTES
RE-EVALUATION/   Daily Note     Today's date: 2023  Patient name: Estefanía Dasilva  : 1950  MRN: [id number]  Referring provider: Deshaun Cardenas MD  Dx:   Encounter Diagnosis     ICD-10-CM    1. S/P ORIF (open reduction internal fixation) fracture  Z98.890     Z87.81                      Subjective: patient reports that she again banged her right arm - this time her elbow   Notes that changes / additions today cause some discomfort - primarily while performing the task.    States that she is attempting to use her arm more at home , has been using some light weight as well .      Objective: See treatment diary below      Goals  STG  2-4 weeks   1.  Decrease pain by at least two subjective ratings in 4 weeks  MET  2.  Continue to adhere to sling use/ weight bearing restrictions per MD  2-4 weeks.  MET     LTG  4-8 weeks  1.  Independent with HEP  ONGOING  2.  Progress to AA - AROM right shoulder as ordered by MD - with restoration of full ROM all planes when appropriate  ONGOING  3.  Abolished TTP right shoulder as well as along myofascial tissue cervical / scap border right.   Partially MET    AROM supine Right shoulder Flexion = 105  abd = 85   ER = 30  IR = 50   AAROM supine right shoulder flexion = 125  abd = 130   ER = 40  IR = 55         Assessment: Tolerated treatment better   Patients AA ROM / AROM continues to improve     Plan: Continue per plan of care.    Patients MD follow up is 24   continue to progress as advised on last MD follow up 23         POC expires Unit limit Auth Expiration date PT/OT/ST + Visit Limit?   23 bomn pending bomn                           Visit/Unit Tracking  AUTH Status:  Date 11/9 11/15 11/17 11/22 11/24 11/ 29 12/1 12/4 12/7 12/13 12/15 12/22   Pending  Used 1 2 3 4 5 6 7 8 9 10 11 12    Remaining     FOTO                 Manuals 12/15 12/22       12/7 12/13                PROM Right shoulder NO ABDUCTION LA  To tolerance db       LA LA  To  tolerance   AAROM Right elbow flex/ ext LA dc       LA LA                                          Neuro Re-Ed                          Back rolls/ scap retract 20x each 20x ea        20x each  20x each   Elbow flexion / extension  20x each HEP       20x 20x                                                        Ther Ex             Pulleys  5' 5'       5' 5'   Pendulums -flex/ext/ ab/add, circles  1' each  1' ea        1' each 1' each   Supine cane cp, flexion  Held 2*  pain 20x ea        Flex only 17x  P! With CP attempt Held today   TB - rows/ ext  20x ea  gtb           Wall slides flex :05  10x  P!  Modified ROM 10x 10s        :10  10x :10  10x to tolerance   Ladder walks Pt def 2* P! 10x        10x Pt def   Aa flexion in supine Pt def  2* P! 20-30x       20x      Aa abd, er/ ir  20-30x        NP    Supine serratus/ tricep  20x ea           Rythmic stab supine   @90  5x  hold 10s                        Place and hold @ wall             Arc @ wall                                                     Ther Activity                                       Gait Training                                       Modalities             MHP Cerv/ UT 10' 10'        Seated  10'    Cp right shoulder           Seated  8'

## 2023-12-27 ENCOUNTER — OFFICE VISIT (OUTPATIENT)
Dept: PHYSICAL THERAPY | Facility: CLINIC | Age: 73
End: 2023-12-27
Payer: COMMERCIAL

## 2023-12-27 DIAGNOSIS — Z87.81 S/P ORIF (OPEN REDUCTION INTERNAL FIXATION) FRACTURE: Primary | ICD-10-CM

## 2023-12-27 DIAGNOSIS — Z98.890 S/P ORIF (OPEN REDUCTION INTERNAL FIXATION) FRACTURE: Primary | ICD-10-CM

## 2023-12-27 PROCEDURE — 97110 THERAPEUTIC EXERCISES: CPT | Performed by: PHYSICAL THERAPIST

## 2023-12-27 NOTE — PROGRESS NOTES
"  Daily Note     Today's date: 2023  Patient name: Estefanía Dasilva  : 1950  MRN: [id number]  Referring provider: Deshaun Cardenas MD  Dx:   Encounter Diagnosis     ICD-10-CM    1. S/P ORIF (open reduction internal fixation) fracture  Z98.890     Z87.81                      Subjective: patient without new c/o today -  c/o \"crackling\", (+) discomfort with stretching AAROM, attempting to hold arm up independently       Objective: See treatment diary below  AA flexion- approx 145  AA abd - approx 150  ( both with mild discomfort reported end range )     Assessment: Tolerated treatment better   Patients AA ROM / AROM continues to improve     Plan: Continue per plan of care.             POC expires Unit limit Auth Expiration date PT/OT/ST + Visit Limit?   23 bomn pending bomn                           Visit/Unit Tracking  AUTH Status:  Date 12/27  11/17 11/22 11/24 11/ 29 12/1 12/4 12/7 12/13 12/15 12/22   Pending  Used 13  3 4 5 6 7 8 9 10 11 12    Remaining     FOTO                 Manuals 12/15 12/22 12/27      12/7 12/13                PROM Right shoulder NO ABDUCTION LA  To tolerance db db      LA LA  To tolerance   AAROM Right elbow flex/ ext LA dc db      LA LA                                          Neuro Re-Ed                          Back rolls/ scap retract 20x each 20x ea  20x ea       20x each  20x each                                                                    Ther Ex             UBE   5' alt           Pulleys  5' 5' 5'      5' 5'   Pendulums -flex/ext/ ab/add, circles  1' each  1' ea  1' ea       1' each 1' each   Supine cane cp, flexion  Held 2*  pain 20x ea  20x ea       Flex only 17x  P! With CP attempt Held today   TB - rows/ ext  20x ea  gtb 20x ea GTB          Wall slides flex :05  10x  P!  Modified ROM 10x 10s  10s x 10       :10  10x :10  10x to tolerance                Aa flexion in supine Pt def  2* P! 20-30x 30x AA      20x      Aa abd, er/ ir  20-30x  30x AA      " NP    Supine serratus/ tricep  20x ea 20x AA prn          Rythmic stab supine   @90  5x  hold 10s AA @ 90  5s x 10          BOR, bicep curls   20x ea           Place and hold @ wall   AA 5s x 10          Arc @ wall    20x                                                  Ther Activity                                       Gait Training                                       Modalities             MHP Cerv/ UT 10' 10'  10'       Seated  10'    Cp right shoulder           Seated  8'

## 2023-12-29 ENCOUNTER — OFFICE VISIT (OUTPATIENT)
Dept: PHYSICAL THERAPY | Facility: CLINIC | Age: 73
End: 2023-12-29
Payer: COMMERCIAL

## 2023-12-29 DIAGNOSIS — Z98.890 S/P ORIF (OPEN REDUCTION INTERNAL FIXATION) FRACTURE: Primary | ICD-10-CM

## 2023-12-29 DIAGNOSIS — Z87.81 S/P ORIF (OPEN REDUCTION INTERNAL FIXATION) FRACTURE: Primary | ICD-10-CM

## 2023-12-29 PROCEDURE — 97110 THERAPEUTIC EXERCISES: CPT | Performed by: PHYSICAL THERAPIST

## 2023-12-29 PROCEDURE — 97112 NEUROMUSCULAR REEDUCATION: CPT | Performed by: PHYSICAL THERAPIST

## 2023-12-29 NOTE — PROGRESS NOTES
Daily Note     Today's date: 2023  Patient name: Estefanía Dasilva  : 1950  MRN: [id number]  Referring provider: Deshaun Cardenas MD  Dx:   Encounter Diagnosis     ICD-10-CM    1. S/P ORIF (open reduction internal fixation) fracture  Z98.890     Z87.81                      Subjective: patient without new c/o today    notes some discomfort / pain at end range stretching all planes      Objective: See treatment diary below      Assessment: Tolerated treatment better   Patients AA ROM / AROM continues to improve     Plan: Continue per plan of care.                   POC expires Unit limit Auth Expiration date PT/OT/ST + Visit Limit?   23 bomn pending bomn                           Visit/Unit Tracking  AUTH Status:  Date              Pending  Used 13 14 - foto              Remaining                      Manuals 12/15 12/22 12/27 12/29     12/7 12/13                PROM Right shoulder  LA  To tolerance db db Db  added ext in supine     LA LA  To tolerance   AAROM Right elbow flex/ ext LA dc db      LA LA                                          Neuro Re-Ed                          Back rolls/ scap retract 20x each 20x ea  20x ea  20x ea     20x each  20x each                Stand cane ext              Stand cane ir/ ext                                        Ther Ex             UBE   5' alt  6' alt          Pulleys  5' 5' 5'      5' 5'   Pendulums -flex/ext/ ab/add, circles  1' each  1' ea  1' ea  HEP     1' each 1' each   Supine cane cp, flexion  Held 2*  pain 20x ea  20x ea  20x ea     Flex only 17x  P! With CP attempt Held today   TB - rows/ ext, lpd   20x ea  gtb 20x ea GTB 20x ea gtb         Wall slides flex :05  10x  P!  Modified ROM 10x 10s  10s x 10  10s x 10      :10  10x :10  10x to tolerance   Ball slides flexion on ball    10s x 5          Aa flexion in supine Pt def  2* P! 20-30x 30x AA 20x aa     20x      Aa abd, er/ ir  20-30x  30x AA 20x aa     NP    Supine serratus/  tricep  20x ea 20x AA prn 20x aa         Rythmic stab supine   @90  5x  hold 10s AA @ 90  5s x 10 5x  aa         BOR, bicep curls   20x ea  20x ea 1# curls                      Seated cane cp    20x                       Place and hold @ wall   AA 5s x 10 In sit flex/ scap x 10 aa         Arc @ wall    20x  20x                                                 Ther Activity                                       Gait Training                                       Modalities             MHP Cerv/ UT 10' 10'  10'       Seated  10'    Cp right shoulder           Seated  8'

## 2024-01-03 ENCOUNTER — OFFICE VISIT (OUTPATIENT)
Dept: PHYSICAL THERAPY | Facility: CLINIC | Age: 74
End: 2024-01-03
Payer: COMMERCIAL

## 2024-01-03 DIAGNOSIS — Z98.890 S/P ORIF (OPEN REDUCTION INTERNAL FIXATION) FRACTURE: Primary | ICD-10-CM

## 2024-01-03 DIAGNOSIS — Z87.81 S/P ORIF (OPEN REDUCTION INTERNAL FIXATION) FRACTURE: Primary | ICD-10-CM

## 2024-01-03 PROCEDURE — 97112 NEUROMUSCULAR REEDUCATION: CPT

## 2024-01-03 PROCEDURE — 97140 MANUAL THERAPY 1/> REGIONS: CPT

## 2024-01-03 PROCEDURE — 97110 THERAPEUTIC EXERCISES: CPT

## 2024-01-03 NOTE — PROGRESS NOTES
"Daily Note     Today's date: 1/3/2024  Patient name: Estefanía Dasilva  : 1950  MRN: [id number]  Referring provider: Deshaun Cardenas MD  Dx:   Encounter Diagnosis     ICD-10-CM    1. S/P ORIF (open reduction internal fixation) fracture  Z98.890     Z87.81                      Subjective: Upon presentation, patient indicated \"I felt great yesterday! But today I woke up really sore, painful!\"  Post discussion, patient also indicated she \"did a lot yesterday \"with right UE.      Objective: See treatment diary below      Assessment: Tolerated treatment  with verbal and tactile cues to keep focused on tasks requested.  VC's also required for technique and pacing . Limitations in right UE persist, although improving. Patient indicated discomfort at end ROM.  Encouraged patient to work to tolerance to avoid aggravation of symptoms. Patient demonstrated fatigue post treatment and would benefit from continued PT      Plan: Continue per plan of care.  Progress treatment as tolerated.             POC expires Unit limit Auth Expiration date PT/OT/ST + Visit Limit?   23 bomn pending bomn                           Visit/Unit Tracking  AUTH Status:  Date 12/27 12/29 1/3            Pending  Used 13 14 - foto 15             Remaining                      Manuals 12/15 12/22 12/27 12/29 1/3    12/7 12/13                PROM Right shoulder  LA  To tolerance db db Db  added ext in supine LA    LA LA  To tolerance   AAROM Right elbow flex/ ext LA dc db      LA LA                                          Neuro Re-Ed                          Back rolls/ scap retract 20x each 20x ea  20x ea  20x ea 20x each    20x each  20x each                Stand cane ext              Stand cane ir/ ext                                        Ther Ex             UBE   5' alt  6' alt  Alt  6'        Pulleys  5' 5' 5'      5' 5'   Pendulums -flex/ext/ ab/add, circles  1' each  1' ea  1' ea  HEP     1' each 1' each   Supine cane cp, flexion  " Held 2*  pain 20x ea  20x ea  20x ea 20x each    Flex only 17x  P! With CP attempt Held today   TB - rows/ ext, lpd   20x ea  gtb 20x ea GTB 20x ea gtb GTB  20x each        Wall slides flex :05  10x  P!  Modified ROM 10x 10s  10s x 10  10s x 10  :10  10x    :10  10x :10  10x to tolerance   Ball slides flexion on ball    10s x 5  Wall  :05  10x        Aa flexion in supine Pt def  2* P! 20-30x 30x AA 20x aa 5x   Place and hold 3 spots    20x      Aa abd, er/ ir  20-30x  30x AA 20x aa Place and hold  5x each  Abd-P!    NP    Supine serratus/ tricep  20x ea 20x AA prn 20x aa NP        Rythmic stab supine   @90  5x  hold 10s AA @ 90  5s x 10 5x  aa AA  5x        BOR, bicep curls   20x ea  20x ea 1# curls 1#  Tricep ext  20x each                     Seated cane cp    20x  10x2                     Place and hold @ wall   AA 5s x 10 In sit flex/ scap x 10 aa Unable  P!        Arc @ wall    20x  20x  20x                                               Ther Activity                                       Gait Training                                       Modalities             MHP Cerv/ UT 10' 10'  10'   10'    Seated  10'    Cp right shoulder           Seated  8'

## 2024-01-05 ENCOUNTER — OFFICE VISIT (OUTPATIENT)
Dept: PHYSICAL THERAPY | Facility: CLINIC | Age: 74
End: 2024-01-05
Payer: COMMERCIAL

## 2024-01-05 DIAGNOSIS — Z87.81 S/P ORIF (OPEN REDUCTION INTERNAL FIXATION) FRACTURE: Primary | ICD-10-CM

## 2024-01-05 DIAGNOSIS — Z98.890 S/P ORIF (OPEN REDUCTION INTERNAL FIXATION) FRACTURE: Primary | ICD-10-CM

## 2024-01-05 PROCEDURE — 97140 MANUAL THERAPY 1/> REGIONS: CPT

## 2024-01-05 PROCEDURE — 97110 THERAPEUTIC EXERCISES: CPT

## 2024-01-05 NOTE — PROGRESS NOTES
"Daily Note     Today's date: 2024  Patient name: Estefanía Dasilva  : 1950  MRN: [id number]  Referring provider: Deshaun Cardenas MD  Dx:   Encounter Diagnosis     ICD-10-CM    1. S/P ORIF (open reduction internal fixation) fracture  Z98.890     Z87.81                      Subjective: Upon presentation, patient's chief complaint is \"stiffness\". Patient reported \"discomfort and \"painful\" at end ROM.      Objective: See treatment diary below      Assessment: Tolerated treatment  demonstrating gradual gains in AAROM of right UE. Patient is easily distracted and requires vc's for pacing, technique and to complete tasks requested . Patient would benefit from continued PT      Plan: Continue per plan of care.            POC expires Unit limit Auth Expiration date PT/OT/ST + Visit Limit?   23 bomn pending bomn                           Visit/Unit Tracking  AUTH Status:  Date 12/27 12/29 1/3 1/5           Pending  Used 13 14 - foto 15 16            Remaining                      Manuals 12/15 12/22 12/27 12/29 1/3 1/5   12/7 12/13                PROM Right shoulder  LA  To tolerance db db Db  added ext in supine LA LA   LA LA  To tolerance   AAROM Right elbow flex/ ext LA dc db      LA LA                                          Neuro Re-Ed                          Back rolls/ scap retract 20x each 20x ea  20x ea  20x ea 20x each 20x each   20x each  20x each                Stand cane ext       15x       Stand cane ir/ ext       215                                 Ther Ex             UBE   5' alt  6' alt  Alt  6' Alt  6'       Pulleys  5' 5' 5'      5' 5'   Pendulums -flex/ext/ ab/add, circles  1' each  1' ea  1' ea  HEP     1' each 1' each   Supine cane cp, flexion  Held 2*  pain 20x ea  20x ea  20x ea 20x each 20x each   Flex only 17x  P! With CP attempt Held today   TB - rows/ ext, lpd   20x ea  gtb 20x ea GTB 20x ea gtb GTB  20x each NP       Wall slides flex :05  10x  P!  Modified ROM 10x 10s  10s x " 10  10s x 10  :10  10x :10  10x   :10  10x :10  10x to tolerance   Ball slides flexion on ball    10s x 5  Wall  :05  10x :10  5x       Aa flexion in supine Pt def  2* P! 20-30x 30x AA 20x aa 5x   Place and hold 3 spots 10x  AA   20x      Aa abd, er/ ir  20-30x  30x AA 20x aa Place and hold  5x each  Abd-P! AA  15x each   NP    Supine serratus/ tricep  20x ea 20x AA prn 20x aa NP 20x each       Rythmic stab supine   @90  5x  hold 10s AA @ 90  5s x 10 5x  aa AA  5x        BOR, bicep curls   20x ea  20x ea 1# curls 1#  Tricep ext  20x each 1#  20x each                    Seated cane cp    20x  10x2 10x2                    Place and hold @ wall   AA 5s x 10 In sit flex/ scap x 10 aa Unable  P! P!       Arc @ wall    20x  20x  20x 10x2                                              Ther Activity                                       Gait Training                                       Modalities             MHP Cerv/ UT 10' 10'  10'   10' 10'   Seated  10'    Cp right shoulder           Seated  8'

## 2024-01-10 ENCOUNTER — APPOINTMENT (OUTPATIENT)
Dept: PHYSICAL THERAPY | Facility: CLINIC | Age: 74
End: 2024-01-10
Payer: COMMERCIAL

## 2024-01-12 NOTE — PROGRESS NOTES
RE-EVALUATION     Patient failed to show for her re-assessment today and has not attended PT sessions in 1 week.   She has MD follow up next week with no further PT sessions scheduled.     Current measurements are not available   She was however demonstrating improving functional movements, competency with HEP to date, improving A/AAROM with (+) compensatory movements to achieve.      Continue as advised after MD f/u next week - patient will be to schedule further PT sessions.     Thank you

## 2024-01-14 DIAGNOSIS — Z87.81 S/P ORIF (OPEN REDUCTION INTERNAL FIXATION) FRACTURE: Primary | ICD-10-CM

## 2024-01-14 DIAGNOSIS — Z98.890 S/P ORIF (OPEN REDUCTION INTERNAL FIXATION) FRACTURE: Primary | ICD-10-CM

## 2024-01-16 ENCOUNTER — TELEPHONE (OUTPATIENT)
Age: 74
End: 2024-01-16

## 2024-01-16 NOTE — TELEPHONE ENCOUNTER
Caller:     Doctor: Dr. Cardenas    Reason for call:  calling stating that he would attempt to get wife in for post op appt.  He is not sure that they could get in.  He has appt with Dr. Cardenas on Thursday at 8:45AM.   is wondering if there is any way Dr. Cardenas could see his wife on Thursday for post op appt? He has to go out and clear snow with his walker. I attempted to reach office.  He can be reached at the number below.     Call back#: [phone]

## 2024-01-18 ENCOUNTER — APPOINTMENT (OUTPATIENT)
Dept: RADIOLOGY | Facility: CLINIC | Age: 74
End: 2024-01-18
Payer: COMMERCIAL

## 2024-01-18 ENCOUNTER — OFFICE VISIT (OUTPATIENT)
Dept: OBGYN CLINIC | Facility: CLINIC | Age: 74
End: 2024-01-18

## 2024-01-18 VITALS
HEART RATE: 93 BPM | BODY MASS INDEX: 28.17 KG/M2 | SYSTOLIC BLOOD PRESSURE: 165 MMHG | DIASTOLIC BLOOD PRESSURE: 97 MMHG | HEIGHT: 63 IN | WEIGHT: 159 LBS

## 2024-01-18 DIAGNOSIS — Z87.81 S/P ORIF (OPEN REDUCTION INTERNAL FIXATION) FRACTURE: Primary | ICD-10-CM

## 2024-01-18 DIAGNOSIS — S42.201A CLOSED FRACTURE OF PROXIMAL END OF RIGHT HUMERUS, UNSPECIFIED FRACTURE MORPHOLOGY, INITIAL ENCOUNTER: ICD-10-CM

## 2024-01-18 DIAGNOSIS — Z98.890 S/P ORIF (OPEN REDUCTION INTERNAL FIXATION) FRACTURE: Primary | ICD-10-CM

## 2024-01-18 DIAGNOSIS — Z98.890 S/P ORIF (OPEN REDUCTION INTERNAL FIXATION) FRACTURE: ICD-10-CM

## 2024-01-18 DIAGNOSIS — Z87.81 S/P ORIF (OPEN REDUCTION INTERNAL FIXATION) FRACTURE: ICD-10-CM

## 2024-01-18 PROCEDURE — 73030 X-RAY EXAM OF SHOULDER: CPT

## 2024-01-18 PROCEDURE — 99024 POSTOP FOLLOW-UP VISIT: CPT | Performed by: ORTHOPAEDIC SURGERY

## 2024-01-18 NOTE — PROGRESS NOTES
Orthopaedics Office Visit - Post-op Patient Visit    ASSESSMENT/PLAN:    Assessment:   12 weeks status post right proximal humerus greater tuberosity fracture ORIF, DOS 10/25/2023  Able to forward flex past neutral, greater PROM than AROM    Plan:   The patient's x-rays were reviewed today.  Renewed PT for strengthening and aggressive motion  I will see the patient back in 3 months for repeat films and motion/strength check      To Do Next Visit:  Strength check, final xray    _____________________________________________________  CHIEF COMPLAINT:  Chief Complaint   Patient presents with    Right Arm - Post-op         SUBJECTIVE:  Estefanía Dasilva is a 73 y.o. female who presents 12 weeks s/p right humerus fracture ORIF, DOS 10/25/2023.  The patient continues to attend physical therapy. She continues to perform her home exercise plan. She can lift roughly 8 lbs. She is not experiencing pain currently. She uses Tylenol and Advil as needed for symptom management. She has resumed most of her activities od daily life. She has no concerns at this time.       SOCIAL HISTORY:  Social History     Tobacco Use    Smoking status: Former     Types: Cigarettes    Smokeless tobacco: Never    Tobacco comments:     no cig use as of 8/12/19 , using rare CBD oil vapor   Substance Use Topics    Alcohol use: Not Currently    Drug use: Yes     Types: Marijuana     Comment: medical marijuana       MEDICATIONS:    Current Outpatient Medications:     acetaminophen (TYLENOL) 500 mg tablet, Take 1,000 mg by mouth every 6 (six) hours as needed for mild pain, Disp: , Rfl:     b complex vitamins tablet, Take 1 tablet by mouth daily, Disp: , Rfl:     cholecalciferol (VITAMIN D3) 400 units tablet, Take 400 Units by mouth daily, Disp: , Rfl:     fluticasone (FLONASE) 50 mcg/act nasal spray, 1 spray into each nostril daily, Disp: , Rfl:     glucosamine-chondroitin 500-400 MG tablet, Take 1 tablet by mouth 2 (two) times a day , Disp: , Rfl:      "loratadine (CLARITIN) 10 mg tablet, Take 10 mg by mouth daily, Disp: , Rfl:     Multiple Vitamins-Minerals (MULTIVITAMIN WITH MINERALS) tablet, Take 1 tablet by mouth daily, Disp: , Rfl:     vitamin E 100 UNIT capsule, Take 100 Units by mouth daily, Disp: , Rfl:     Ascorbic Acid (VITAMIN C) 100 MG tablet, Take 100 mg by mouth daily, Disp: , Rfl:     oxyCODONE (Roxicodone) 5 immediate release tablet, Take 1 tablet (5 mg total) by mouth every 4 (four) hours as needed for moderate pain Max Daily Amount: 30 mg, Disp: 30 tablet, Rfl: 0    REVIEW OF SYSTEMS:  MSK: noted in HPI  Neuro: WNL  Pertinent items are otherwise noted in HPI.  A comprehensive review of systems was otherwise negative.    _____________________________________________________  PHYSICAL EXAMINATION:  Vital signs: /97   Pulse 93   Ht 5' 3\" (1.6 m)   Wt 72.1 kg (159 lb)   BMI 28.17 kg/m²   General: No acute distress, awake and alert  Psychiatric: Mood and affect appear appropriate  HEENT: Trachea Midline, No torticollis, no apparent facial trauma  Cardiovascular: No audible murmurs; Extremities appear perfused  Pulmonary: No audible wheezing or stridor  Skin: No open lesions; see further details (if any) below    MUSCULOSKELETAL EXAMINATION:  Extremities:    Right Shoulder:   No anatomical deformity   The patient's surgical scar is well healed   There is no tenderness present.   Active range of motion   80 degrees forward flexion  75 degrees abduction  30 degrees external rotation   Passive range of motion   160 degrees of forward flexion     There is 4-/5 strength with supraspinatus testing.  There is 4/5 strength with infraspinatus testing.  There is 5/5 strength with subscapularis testing.  Fingers are pink and mobile  Compartments are soft  Brisk capillary refill  Sensation is intact   The patient is neurovascularly intact distally in the extremity.       _____________________________________________________  STUDIES REVIEWED:  I " personally reviewed the images and interpretation is as follows:    X-rays taken 1/18/24 of right shoulder demonstrate healed greater tuberosity fracture, hardware in place, minimal migration    PROCEDURES PERFORMED:  None    Scribe Attestation      I,:  Saira Ramos am acting as a scribe while in the presence of the attending physician.:       I,:  Deshaun Cardenas MD personally performed the services described in this documentation    as scribed in my presence.:

## 2024-03-06 ENCOUNTER — EVALUATION (OUTPATIENT)
Dept: PHYSICAL THERAPY | Facility: CLINIC | Age: 74
End: 2024-03-06
Payer: COMMERCIAL

## 2024-03-06 DIAGNOSIS — Z87.81 S/P ORIF (OPEN REDUCTION INTERNAL FIXATION) FRACTURE: Primary | ICD-10-CM

## 2024-03-06 DIAGNOSIS — Z98.890 S/P ORIF (OPEN REDUCTION INTERNAL FIXATION) FRACTURE: Primary | ICD-10-CM

## 2024-03-06 DIAGNOSIS — S42.201A CLOSED FRACTURE OF PROXIMAL END OF RIGHT HUMERUS, UNSPECIFIED FRACTURE MORPHOLOGY, INITIAL ENCOUNTER: ICD-10-CM

## 2024-03-06 PROCEDURE — 97110 THERAPEUTIC EXERCISES: CPT | Performed by: PHYSICAL THERAPIST

## 2024-03-06 PROCEDURE — 97161 PT EVAL LOW COMPLEX 20 MIN: CPT | Performed by: PHYSICAL THERAPIST

## 2024-03-06 NOTE — PROGRESS NOTES
PT Evaluation     Today's date: 3/6/2024  Patient name: Estefanía Dasilva  : 1950  MRN: [id number]  Referring provider: Deshaun Cardenas MD  Dx:   Encounter Diagnosis     ICD-10-CM    1. S/P ORIF (open reduction internal fixation) fracture  Z98.890     Z87.81                      Assessment  Assessment details: Patient returns after a 2 month hiatus from PT    She had orders after seeing Dr. Cardenas in  to return to PT for more aggressive stretching/ strengthening.     Evaluation reveals:  significant m. Atrophy right shoulder/ RTC/ deltoid, loss of A/ AAROM, significant weakness, decreased postural awareness/ self correction.     She requires re-direction for exercise focus/ techniques.   Trial kinesiotape for m. Assist right shoulder attempted today. ( Monitor longer term response to be checked next session )    Impairments: abnormal or restricted ROM, abnormal movement, activity intolerance, impaired physical strength, lacks appropriate home exercise program, pain with function and poor posture   Understanding of Dx/Px/POC: good   Prognosis: good    Goals  STG  2-4 weeks   1.  Decrease pain by at least two subjective ratings in 4 weeks  2.  Improved postural awareness and self correction     LTG  4-8 weeks  1.  Independent with HEP  2.  Improved AROM by 25 %  3-6 weeks   50%  6-10 weeks.   3.  Improved motor function right shoulder/ scap mm.  4-8 weeks.         Plan  Patient would benefit from: skilled PT  Planned modality interventions: cryotherapy and thermotherapy: hydrocollator packs  Planned therapy interventions: flexibility, graded exercise, home exercise program, therapeutic exercise, strengthening, stretching, patient education, neuromuscular re-education, manual therapy, IADL retraining, kinesiology taping and joint mobilization  Frequency: 2x week  Plan of Care expiration date: 2024  Treatment plan discussed with: patient    Subjective Evaluation    History of Present Illness  Mechanism  "of injury: On  - she fell in kitchen \" somehow\" and dislocated her right shoulder.    She was relocated in the ED  10-25-23   she underwent R ORIF due to greater tuberosity humeral fx.     She had f/u with MD in  and was referred back to PT at that     She feels that she can lift up to 20# now.   She reports no sleep disruption .     \"I still have trouble lifting it up \"     Patient Goals  Patient goals for therapy: decreased pain, increased motion, independence with ADLs/IADLs and return to sport/leisure activities  Patient goal: \" i want to get my arm back to where it was \"   \"I want to garden\"  Pain  Current pain ratin  Quality: dull ache  Aggravating factors: lifting  Progression: improved    Social Support  Lives with: spouse    Hand dominance: right        Objective     Postural Observations    Additional Postural Observation Details  Guarded right UE posturing in/ out of sling    Observations     Right Shoulder  Positive for atrophy.     Additional Observation Details  Significant m. Atrophy right shoulder/ deltoid/ rtc/ posterior capsule / teres region     Cervical/Thoracic Screen   Cervical range of motion within normal limits with the following exceptions: Full cervical movements all planes with mild limitation with rotation right with mild discomfort reported.     Neurological Testing     Sensation     Shoulder   Left Shoulder   Intact: light touch    Right Shoulder   Intact: Light touch    Active Range of Motion   Left Shoulder   Normal active range of motion    Right Shoulder   Flexion: 85 degrees with pain  Abduction: 70 degrees with pain  External rotation BTH: Active external rotation behind the head: to ear.   Internal rotation BTB: Active internal rotation behind the back: to lateral side.     Passive Range of Motion     Right Shoulder   Flexion: 130 degrees with pain  Abduction: 120 degrees with pain  External rotation 45°: 10 degrees with pain  Internal rotation 45°: " WFL    Strength/Myotome Testing     Left Shoulder   Normal muscle strength    Right Shoulder     Planes of Motion   Flexion: 2+   Extension: 3   Abduction: 2+   Adduction: 3   External rotation at 0°: 2+   Internal rotation at 0°: 4-     Isolated Muscles   Biceps: 3+   Triceps: 3              Precautions: s/p R shoulder ORIF 10-25-23      POC expires Unit limit Auth Expiration date PT/OT/ST + Visit Limit?   5/6/24 bomn pending bomn                           Visit/Unit Tracking  AUTH Status:  Date 3/6              Pending  Used 1               Remaining                      Manuals 3/6                         PROM Right shoulder  db                                                   Neuro Re-Ed                          Supine AA flex/ abd                                                                               Ther Ex             UBE 2'/2'            TB OH flexion assist Rtb 5s x 20            TB OH abd assist Unable w/o assist            Supine cane flexion/ CP             TRX walk outs                          Ball roll outs 3 way                          Serratus/ triceps                                                                                           Ther Activity                                       Gait Training                                       Modalities             MHP R shoulder prn             Cp right shoulder

## 2024-03-07 ENCOUNTER — OFFICE VISIT (OUTPATIENT)
Dept: PHYSICAL THERAPY | Facility: CLINIC | Age: 74
End: 2024-03-07
Payer: COMMERCIAL

## 2024-03-07 DIAGNOSIS — S42.201A CLOSED FRACTURE OF PROXIMAL END OF RIGHT HUMERUS, UNSPECIFIED FRACTURE MORPHOLOGY, INITIAL ENCOUNTER: ICD-10-CM

## 2024-03-07 DIAGNOSIS — Z98.890 S/P ORIF (OPEN REDUCTION INTERNAL FIXATION) FRACTURE: Primary | ICD-10-CM

## 2024-03-07 DIAGNOSIS — Z87.81 S/P ORIF (OPEN REDUCTION INTERNAL FIXATION) FRACTURE: Primary | ICD-10-CM

## 2024-03-07 PROCEDURE — 97110 THERAPEUTIC EXERCISES: CPT

## 2024-03-07 PROCEDURE — 97140 MANUAL THERAPY 1/> REGIONS: CPT

## 2024-03-07 PROCEDURE — 97112 NEUROMUSCULAR REEDUCATION: CPT

## 2024-03-07 NOTE — PROGRESS NOTES
Daily Note     Today's date: 3/7/2024  Patient name: Estefanía Dasilva  : 1950  MRN: [id number]  Referring provider: Deshaun Cardenas MD  Dx:   Encounter Diagnosis     ICD-10-CM    1. S/P ORIF (open reduction internal fixation) fracture  Z98.890     Z87.81       2. Closed fracture of proximal end of right humerus, unspecified fracture morphology, initial encounter  S42.201A                      Subjective: Patient indicated a decline in subjective pain of right shoulder and improved ROM post today's intervention.  Patient was undecided if taping assisted with right UE support.       Objective: See treatment diary below      Assessment: Tolerated treatment  demonstrating gradual improvement in AAROM as session progressed; encouraged patient to only work to tolerance and to avoid aggravating symptoms . VC's required to assist patient with impulsivity and technique/posture with good response and carryover.  Patient demonstrated fatigue post treatment and would benefit from continued PT      Plan: Continue per plan of care.      Precautions: s/p R shoulder ORIF 10-25-23      POC expires Unit limit Auth Expiration date PT/OT/ST + Visit Limit?   24 bomn pending bomn                           Visit/Unit Tracking  AUTH Status:  Date 3/6 3/7             Pending  Used 1 2              Remaining                      Manuals 3/6 3/7                        PROM Right shoulder  db LA                                                  Neuro Re-Ed                          Supine AA flex/ abd   :03  20x each           R UE wall slides:  flexion and abduction  :10  5x each                                                               Ther Ex             UBE 2'/2' 2'/2'           TB OH flexion assist Rtb 5s x 20 RTB  :05  20x           TB OH abd assist Unable w/o assist            Supine cane flexion/ CP  :03  20x           TRX walk outs  Flex/ext  :10  5x each                        Ball roll outs 3 way  :05  5x each                         Serratus/ triceps  :03  10x each  Vc's/AAROM                                                                                           Ther Activity                                         Gait Training                                         Modalities             MHP R shoulder prn  Pt def           Cp right shoulder   Pt def

## 2024-03-11 ENCOUNTER — OFFICE VISIT (OUTPATIENT)
Dept: PHYSICAL THERAPY | Facility: CLINIC | Age: 74
End: 2024-03-11
Payer: COMMERCIAL

## 2024-03-11 DIAGNOSIS — Z98.890 S/P ORIF (OPEN REDUCTION INTERNAL FIXATION) FRACTURE: Primary | ICD-10-CM

## 2024-03-11 DIAGNOSIS — S42.201A CLOSED FRACTURE OF PROXIMAL END OF RIGHT HUMERUS, UNSPECIFIED FRACTURE MORPHOLOGY, INITIAL ENCOUNTER: ICD-10-CM

## 2024-03-11 DIAGNOSIS — Z87.81 S/P ORIF (OPEN REDUCTION INTERNAL FIXATION) FRACTURE: Primary | ICD-10-CM

## 2024-03-11 PROCEDURE — 97112 NEUROMUSCULAR REEDUCATION: CPT | Performed by: PHYSICAL THERAPIST

## 2024-03-11 PROCEDURE — 97110 THERAPEUTIC EXERCISES: CPT | Performed by: PHYSICAL THERAPIST

## 2024-03-11 NOTE — PROGRESS NOTES
"Daily Note     Today's date: 3/11/2024  Patient name: Estefanía Dasilva  : 1950  MRN: [id number]  Referring provider: Deshaun Cardenas MD  Dx:   Encounter Diagnosis     ICD-10-CM    1. S/P ORIF (open reduction internal fixation) fracture  Z98.890     Z87.81       2. Closed fracture of proximal end of right humerus, unspecified fracture morphology, initial encounter  S42.201A                      Subjective: patient continues to c/o pain / discomfort with stretching but notes feeling that she has more mobility   \"I can get dressed easier \"   Feels the kinesiotape is helpful and requesting to retape today       Objective: See treatment diary below      Assessment: Tolerated treatment fair - much cueing for technique necessary     Plan: Continue per plan of care.    Continue to focus on AA - aROM   right shoulder all planes.        Precautions: s/p R shoulder ORIF 10-25-23      POC expires Unit limit Auth Expiration date PT/OT/ST + Visit Limit?   24 bomn pending bomn                           Visit/Unit Tracking  AUTH Status:  Date 3/6 3/7 3/11            Pending  Used 1 2 3             Remaining                      Manuals 3/6 3/7 3/11                       PROM Right shoulder  db LA db                                                              Neuro Re-Ed                          Supine AA flex/ abd   :03  20x each 10 x 2           R UE wall slides:  flexion and abduction  :10  5x each 10s x 5 ea                                                               Ther Ex             UBE 2'/2' 2'/2' 3'/3'          pulleys   5'           TB OH flexion assist Rtb 5s x 20 RTB  :05  20x Gtb x 10 10s           TB OH abd assist Unable w/o assist            Supine cane flexion/ CP  :03  20x 1# x 20 ea           TRX walk outs  Flex/ext  :10  5x each 10s x 5 ea                        Ball roll outs 3 way  :05  5x each 10s x 5 ea                        Serratus/ triceps  :03  10x each  Vc's/AAROM 3s x 10 AA cueing    "        MRE ir/er   10x                                                                              Ther Activity                                         Gait Training                                         Modalities             MHP R shoulder prn  Pt def           Cp right shoulder   Pt def def

## 2024-03-14 ENCOUNTER — OFFICE VISIT (OUTPATIENT)
Dept: PHYSICAL THERAPY | Facility: CLINIC | Age: 74
End: 2024-03-14
Payer: COMMERCIAL

## 2024-03-14 DIAGNOSIS — Z87.81 S/P ORIF (OPEN REDUCTION INTERNAL FIXATION) FRACTURE: Primary | ICD-10-CM

## 2024-03-14 DIAGNOSIS — Z98.890 S/P ORIF (OPEN REDUCTION INTERNAL FIXATION) FRACTURE: Primary | ICD-10-CM

## 2024-03-14 DIAGNOSIS — S42.201A CLOSED FRACTURE OF PROXIMAL END OF RIGHT HUMERUS, UNSPECIFIED FRACTURE MORPHOLOGY, INITIAL ENCOUNTER: ICD-10-CM

## 2024-03-14 PROCEDURE — 97110 THERAPEUTIC EXERCISES: CPT | Performed by: PHYSICAL THERAPIST

## 2024-03-14 PROCEDURE — 97112 NEUROMUSCULAR REEDUCATION: CPT | Performed by: PHYSICAL THERAPIST

## 2024-03-14 NOTE — PROGRESS NOTES
Daily Note     Today's date: 3/14/2024  Patient name: Estefanía Dasilva  : 1950  MRN: [id number]  Referring provider: Deshaun Cardenas MD  Dx:   Encounter Diagnosis     ICD-10-CM    1. S/P ORIF (open reduction internal fixation) fracture  Z98.890     Z87.81       2. Closed fracture of proximal end of right humerus, unspecified fracture morphology, initial encounter  S42.201A                      Subjective: patient feels that she is gaining more mobility slowly    reports that she is working on her exercises/ stretching at home every day       Objective: See treatment diary below      Assessment: Tolerated treatment fair - remains with weakness dominating and painful end range all planes with manual stretching/ AAROM    Plan: Continue per plan of care.    Continue to focus on AA - aROM   right shoulder all planes.        Precautions: s/p R shoulder ORIF 10-25-23      POC expires Unit limit Auth Expiration date PT/OT/ST + Visit Limit?   24 bomn pending bomn                           Visit/Unit Tracking  AUTH Status:  Date 3/6 3/7 3/11 3/14           Pending  Used 1 2 3 4            Remaining                      Manuals 3/6 3/7 3/11 3/14                      PROM Right shoulder  db LA db db                                                             Neuro Re-Ed                          Supine AA flex/ abd   :03  20x each 10 x 2  10 x 2         R UE wall slides:  flexion and abduction  :10  5x each 10s x 5 ea  10s x 5                                                              Ther Ex             UBE 2'/2' 2'/2' 3'/3' 4'/4'         pulleys   5'  5'          TB OH flexion assist Rtb 5s x 20 RTB  :05  20x Grn x 10 10s  Grn x 20  10s         Supine cp     1# x 20          Supine cane flexion/ CP  :03  20x 1# x 20 ea  1# x 20          TRX walk outs  Flex/ext  :10  5x each 10s x 5 ea                        Ball roll outs 3 way  :05  5x each 10s x 5 ea  10s x 5                       Serratus/ triceps   :03  10x each  Vc's/AAROM 3s x 10 AA cueing  1#  aa  x 20  cueing         MRE ir/er   10x                                                                              Ther Activity                                         Gait Training                                         Modalities             MHP R shoulder prn  Pt def           Cp right shoulder   Pt def def 10'

## 2024-03-18 ENCOUNTER — OFFICE VISIT (OUTPATIENT)
Dept: PHYSICAL THERAPY | Facility: CLINIC | Age: 74
End: 2024-03-18
Payer: COMMERCIAL

## 2024-03-18 DIAGNOSIS — Z98.890 S/P ORIF (OPEN REDUCTION INTERNAL FIXATION) FRACTURE: Primary | ICD-10-CM

## 2024-03-18 DIAGNOSIS — S42.201A CLOSED FRACTURE OF PROXIMAL END OF RIGHT HUMERUS, UNSPECIFIED FRACTURE MORPHOLOGY, INITIAL ENCOUNTER: ICD-10-CM

## 2024-03-18 DIAGNOSIS — Z87.81 S/P ORIF (OPEN REDUCTION INTERNAL FIXATION) FRACTURE: Primary | ICD-10-CM

## 2024-03-18 PROCEDURE — 97112 NEUROMUSCULAR REEDUCATION: CPT | Performed by: PHYSICAL THERAPIST

## 2024-03-18 PROCEDURE — 97110 THERAPEUTIC EXERCISES: CPT | Performed by: PHYSICAL THERAPIST

## 2024-03-18 NOTE — PROGRESS NOTES
Daily Note     Today's date: 3/18/2024  Patient name: Estefanía Dasilva  : 1950  MRN: [id number]  Referring provider: Deshaun Cardenas MD  Dx:   Encounter Diagnosis     ICD-10-CM    1. S/P ORIF (open reduction internal fixation) fracture  Z98.890     Z87.81       2. Closed fracture of proximal end of right humerus, unspecified fracture morphology, initial encounter  S42.201A                      Subjective: patient without new c/o today .       Objective: See treatment diary below      Assessment: Tolerated treatment better today  All AA movements improved today   AA flexion = 160  AA abd = 175  AA ER @ 70 degrees abd = 70  AA IR @ 70 degrees abd = 55   Patient able to perform independent flexion / abd in supine today with (+) compensation .    Plan: Continue per plan of care.    Continue to focus on AA - aROM   right shoulder all planes.        Precautions: s/p R shoulder ORIF 10-25-23      POC expires Unit limit Auth Expiration date PT/OT/ST + Visit Limit?   24 bomn pending bomn                           Visit/Unit Tracking  AUTH Status:  Date 3/6 3/7 3/11 3/14 3/18          Pending  Used 1 2 3 4 5           Remaining                      Manuals 3/6 3/7 3/11 3/14 3/18                     PROM Right shoulder  db LA db db db                                                            Neuro Re-Ed                          Supine AA flex/ abd   :03  20x each 10 x 2  10 x 2 A 10 x 2        R UE wall slides:  flexion and abduction  :10  5x each 10s x 5 ea  10s x 5  10sx5                                                            Ther Ex             UBE 2'/2' 2'/2' 3'/3' 4'/4' 4'/4'        pulleys   5'  5'  5'        TB OH flexion assist Rtb 5s x 20 RTB  :05  20x Grn x 10 10s  Grn x 20  10s Grn 10s x 10         Supine cp     1# x 20  1# 20 x        Supine cane flexion/ CP  :03  20x 1# x 20 ea  1# x 20  2# x 20 ea         TRX walk outs  Flex/ext  :10  5x each 10s x 5 ea   dc                     Ball roll outs  3 way  :05  5x each 10s x 5 ea  10s x 5  10s x 5 ea                      Serratus/ triceps  :03  10x each  Vc's/AAROM 3s x 10 AA cueing  1#  aa  x 20  cueing 1# aa x 20 cues         MRE ir/er   10x                                                                              Ther Activity                                                      Gait Training                                         Modalities             MHP R shoulder prn  Pt def           Cp right shoulder   Pt def def 10'  def

## 2024-03-21 ENCOUNTER — OFFICE VISIT (OUTPATIENT)
Dept: PHYSICAL THERAPY | Facility: CLINIC | Age: 74
End: 2024-03-21
Payer: COMMERCIAL

## 2024-03-21 DIAGNOSIS — Z87.81 S/P ORIF (OPEN REDUCTION INTERNAL FIXATION) FRACTURE: Primary | ICD-10-CM

## 2024-03-21 DIAGNOSIS — Z98.890 S/P ORIF (OPEN REDUCTION INTERNAL FIXATION) FRACTURE: Primary | ICD-10-CM

## 2024-03-21 DIAGNOSIS — S42.201A CLOSED FRACTURE OF PROXIMAL END OF RIGHT HUMERUS, UNSPECIFIED FRACTURE MORPHOLOGY, INITIAL ENCOUNTER: ICD-10-CM

## 2024-03-21 PROCEDURE — 97112 NEUROMUSCULAR REEDUCATION: CPT | Performed by: PHYSICAL THERAPIST

## 2024-03-21 PROCEDURE — 97110 THERAPEUTIC EXERCISES: CPT | Performed by: PHYSICAL THERAPIST

## 2024-03-21 NOTE — PROGRESS NOTES
Daily Note     Today's date: 3/21/2024  Patient name: Estefanía Dasilva  : 1950  MRN: [id number]  Referring provider: Deshaun Cardenas MD  Dx:   Encounter Diagnosis     ICD-10-CM    1. S/P ORIF (open reduction internal fixation) fracture  Z98.890     Z87.81       2. Closed fracture of proximal end of right humerus, unspecified fracture morphology, initial encounter  S42.201A                      Subjective: patient without new c/o today .  Reports that she is trying to use her arm more and more at home.         Objective: See treatment diary below      Assessment: Tolerated treatment better today  much cueing required to keep patient on task as well as how to perform exercises.     Plan: Continue per plan of care.    Continue to focus on AA - aROM   right shoulder all planes.        Precautions: s/p R shoulder ORIF 10-25-23      POC expires Unit limit Auth Expiration date PT/OT/ST + Visit Limit?   24 bomn pending bomn                           Visit/Unit Tracking  AUTH Status:  Date 3/6 3/7 3/11 3/14 3/18 3/21         Pending  Used 1 2 3 4 5 6          Remaining                      Manuals 3/6 3/7 3/11 3/14 3/18 3/21                    PROM Right shoulder  db LA db db db db                                                           Neuro Re-Ed                          Supine AA flex/ abd   :03  20x each 10 x 2  10 x 2 A 10 x 2 A- AA 10 x 2        R UE wall slides:  flexion and abduction  :10  5x each 10s x 5 ea  10s x 5  10sx5 10s x 5                                                            Ther Ex             UBE 2'/2' 2'/2' 3'/3' 4'/4' 4'/4' 4'/4'       pulleys   5'  5'  5' 5'       TB OH flexion assist Rtb 5s x 20 RTB  :05  20x Grn x 10 10s  Grn x 20  10s Grn 10s x 10  Grn 10s x 10        Supine cp     1# x 20  1# 20 x        Supine cane flexion/ CP  :03  20x 1# x 20 ea  1# x 20  2# x 20 ea  2# x 20 ea                                  Ball roll outs 3 way  :05  5x each 10s x 5 ea  10s x 5  10s x 5  ea  10s x 5 ea                     Serratus/ triceps  :03  10x each  Vc's/AAROM 3s x 10 AA cueing  1#  aa  x 20  cueing 1# aa x 20 cues  1# x 20 ea        MRE ir/er   10x                                                                              Ther Activity                                                      Gait Training                                         Modalities             MHP R shoulder prn  Pt def           Cp right shoulder   Pt def def 10'  def

## 2024-03-22 ENCOUNTER — PREP FOR PROCEDURE (OUTPATIENT)
Age: 74
End: 2024-03-22

## 2024-03-22 ENCOUNTER — TELEPHONE (OUTPATIENT)
Age: 74
End: 2024-03-22

## 2024-03-22 DIAGNOSIS — Z12.11 SCREENING FOR COLON CANCER: Primary | ICD-10-CM

## 2024-03-22 NOTE — TELEPHONE ENCOUNTER
03/22/24  Screened by: Lluvia Lopez    Referring Provider Dr. Peña    Pre- Screening:     There is no height or weight on file to calculate BMI.  Has patient been referred for a routine screening Colonoscopy? yes  Is the patient between 45-75 years old? yes      Previous Colonoscopy no   If yes:    Date:     Facility:     Reason:         Does the patient want to see a Gastroenterologist prior to their procedure OR are they having any GI symptoms? no    Has the patient been hospitalized or had abdominal surgery in the past 6 months? no    Does the patient use supplemental oxygen? no    Does the patient take Coumadin, Lovenox, Plavix, Elliquis, Xarelto, or other blood thinning medication? no    Has the patient had a stroke, cardiac event, or stent placed in the past year? no

## 2024-03-22 NOTE — TELEPHONE ENCOUNTER
Scheduled date of colonoscopy (as of today): 4/11/24    Physician performing colonoscopy: Dr. Amin    Location of colonoscopy:  [place]    Bowel prep reviewed with patient: Miralax    Prep instructions sent via [facility]    Pt spouse requesting a call to his number [phone]

## 2024-03-25 ENCOUNTER — OFFICE VISIT (OUTPATIENT)
Dept: PHYSICAL THERAPY | Facility: CLINIC | Age: 74
End: 2024-03-25
Payer: COMMERCIAL

## 2024-03-25 DIAGNOSIS — Z98.890 S/P ORIF (OPEN REDUCTION INTERNAL FIXATION) FRACTURE: Primary | ICD-10-CM

## 2024-03-25 DIAGNOSIS — Z87.81 S/P ORIF (OPEN REDUCTION INTERNAL FIXATION) FRACTURE: Primary | ICD-10-CM

## 2024-03-25 DIAGNOSIS — S42.201A CLOSED FRACTURE OF PROXIMAL END OF RIGHT HUMERUS, UNSPECIFIED FRACTURE MORPHOLOGY, INITIAL ENCOUNTER: ICD-10-CM

## 2024-03-25 PROCEDURE — 97112 NEUROMUSCULAR REEDUCATION: CPT | Performed by: PHYSICAL THERAPIST

## 2024-03-25 PROCEDURE — 97110 THERAPEUTIC EXERCISES: CPT | Performed by: PHYSICAL THERAPIST

## 2024-03-25 NOTE — PROGRESS NOTES
Daily Note     Today's date: 3/25/2024  Patient name: Estefanía Dasilva  : 1950  MRN: [id number]  Referring provider: Deshaun Cardenas MD  Dx:   Encounter Diagnosis     ICD-10-CM    1. S/P ORIF (open reduction internal fixation) fracture  Z98.890     Z87.81       2. Closed fracture of proximal end of right humerus, unspecified fracture morphology, initial encounter  S42.201A                   Subjective: Pt reports nothing new.       Objective: See treatment diary below      Assessment: Tolerated treatment well. Patient would benefit from continued PT. Pt continued to demonstrated GH restrictions both actively and passively. Firm end feels present with PROM.      Plan: Continue per plan of care.  Progress treatment as tolerated.       Precautions: s/p R shoulder ORIF 10-25-23      POC expires Unit limit Auth Expiration date PT/OT/ST + Visit Limit?   24 bomn pending bomn                           Visit/Unit Tracking  AUTH Status:  Date 3/6 3/7 3/11 3/14 3/18 3/21         Pending  Used 1 2 3 4 5 6          Remaining                      Manuals 3/6 3/7 3/11 3/14 3/18 3/21 3/25                   PROM Right shoulder  db LA db db db db KB                                                          Neuro Re-Ed                          Supine AA flex/ abd   :03  20x each 10 x 2  10 x 2 A 10 x 2 A- AA 10 x 2  A- AA 10 x 2       R UE wall slides:  flexion and abduction  :10  5x each 10s x 5 ea  10s x 5  10sx5 10s x 5  10s x 5                                                           Ther Ex             UBE 2'/2' 2'/2' 3'/3' 4'/4' 4'/4' 4'/4' 4'/4'      pulleys   5'  5'  5' 5' 5'      TB OH flexion assist Rtb 5s x 20 RTB  :05  20x Grn x 10 10s  Grn x 20  10s Grn 10s x 10  Grn 10s x 10  Grn 10s x 10       Supine cp     1# x 20  1# 20 x        Supine cane flexion/ CP  :03  20x 1# x 20 ea  1# x 20  2# x 20 ea  2# x 20 ea  2# x 20 ea                                 Ball roll outs 3 way  :05  5x each 10s x 5 ea  10s x 5   10s x 5 ea  10s x 5 ea  10s x 5 ea                   Serratus/ triceps  :03  10x each  Vc's/AAROM 3s x 10 AA cueing  1#  aa  x 20  cueing 1# aa x 20 cues  1# x 20 ea  1# x 20 ea       MRE ir/er   10x                                                                              Ther Activity                                                      Gait Training                                         Modalities             MHP R shoulder prn  Pt def           Cp right shoulder   Pt def def 10'  def

## 2024-03-28 ENCOUNTER — OFFICE VISIT (OUTPATIENT)
Dept: PHYSICAL THERAPY | Facility: CLINIC | Age: 74
End: 2024-03-28
Payer: COMMERCIAL

## 2024-03-28 DIAGNOSIS — Z98.890 S/P ORIF (OPEN REDUCTION INTERNAL FIXATION) FRACTURE: Primary | ICD-10-CM

## 2024-03-28 DIAGNOSIS — S42.201A CLOSED FRACTURE OF PROXIMAL END OF RIGHT HUMERUS, UNSPECIFIED FRACTURE MORPHOLOGY, INITIAL ENCOUNTER: ICD-10-CM

## 2024-03-28 DIAGNOSIS — Z87.81 S/P ORIF (OPEN REDUCTION INTERNAL FIXATION) FRACTURE: Primary | ICD-10-CM

## 2024-03-28 PROCEDURE — 97140 MANUAL THERAPY 1/> REGIONS: CPT

## 2024-03-28 PROCEDURE — 97110 THERAPEUTIC EXERCISES: CPT

## 2024-03-28 NOTE — PROGRESS NOTES
Daily Note     Today's date: 3/28/2024  Patient name: Estefanía Dasilva  : 1950  MRN: [id number]  Referring provider: Deshaun Cardenas MD  Dx:   Encounter Diagnosis     ICD-10-CM    1. S/P ORIF (open reduction internal fixation) fracture  Z98.890     Z87.81       2. Closed fracture of proximal end of right humerus, unspecified fracture morphology, initial encounter  S42.201A                      Subjective: SPR=3/10      Objective: See treatment diary below      Assessment: Tolerated treatment  with improved tolerance to PRE's and improved AROM/AAROM. .FOTO score improved.  Patient demonstrated fatigue post treatment and would benefit from continued PT      Plan: Continue per plan of care.      Precautions: s/p R shoulder ORIF 10-25-23      POC expires Unit limit Auth Expiration date PT/OT/ST + Visit Limit?   24 bomn pending bomn                           Visit/Unit Tracking  AUTH Status:  Date 3/6 3/7 3/11 3/14 3/18 3/21 3/28        Pending  Used 1 2 3 4 5 6 7         Remaining                      Manuals 3/6 3/7 3/11 3/14 3/18 3/21 3/25 3/28                  PROM Right shoulder  db LA db db db db KB LA                                                         Neuro Re-Ed                          Supine AA flex/ abd   :03  20x each 10 x 2  10 x 2 A 10 x 2 A- AA 10 x 2  A- AA 10 x 2  10x2     R UE wall slides:  flexion and abduction  :10  5x each 10s x 5 ea  10s x 5  10sx5 10s x 5  10s x 5  :10  5x each                                                         Ther Ex             UBE 2'/2' 2'/2' 3'/3' 4'/4' 4'/4' 4'/4' 4'/4' 4'/4'     pulleys   5'  5'  5' 5' 5' 5'     TB OH flexion assist Rtb 5s x 20 RTB  :05  20x Grn x 10 10s  Grn x 20  10s Grn 10s x 10  Grn 10s x 10  Grn 10s x 10  GTB  :10  10x2     Supine cp     1# x 20  1# 20 x        Supine cane flexion/ CP  :03  20x 1# x 20 ea  1# x 20  2# x 20 ea  2# x 20 ea  2# x 20 ea  2#  20x                                Ball roll outs 3 way  :05  5x each 10s  x 5 ea  10s x 5  10s x 5 ea  10s x 5 ea  10s x 5 ea :10  5x each                  Serratus/ triceps  :03  10x each  Vc's/AAROM 3s x 10 AA cueing  1#  aa  x 20  cueing 1# aa x 20 cues  1# x 20 ea  1# x 20 ea  1#  20x each     MRE ir/er   10x                                                                              Ther Activity                                                      Gait Training                                         Modalities             MHP R shoulder prn  Pt def           Cp right shoulder   Pt def def 10'  def

## 2024-04-01 ENCOUNTER — TELEPHONE (OUTPATIENT)
Dept: GASTROENTEROLOGY | Facility: CLINIC | Age: 74
End: 2024-04-01

## 2024-04-01 ENCOUNTER — OFFICE VISIT (OUTPATIENT)
Dept: PHYSICAL THERAPY | Facility: CLINIC | Age: 74
End: 2024-04-01
Payer: COMMERCIAL

## 2024-04-01 DIAGNOSIS — Z87.81 S/P ORIF (OPEN REDUCTION INTERNAL FIXATION) FRACTURE: Primary | ICD-10-CM

## 2024-04-01 DIAGNOSIS — S42.201A CLOSED FRACTURE OF PROXIMAL END OF RIGHT HUMERUS, UNSPECIFIED FRACTURE MORPHOLOGY, INITIAL ENCOUNTER: ICD-10-CM

## 2024-04-01 DIAGNOSIS — Z98.890 S/P ORIF (OPEN REDUCTION INTERNAL FIXATION) FRACTURE: Primary | ICD-10-CM

## 2024-04-01 PROCEDURE — 97110 THERAPEUTIC EXERCISES: CPT | Performed by: PHYSICAL THERAPIST

## 2024-04-01 PROCEDURE — 97112 NEUROMUSCULAR REEDUCATION: CPT | Performed by: PHYSICAL THERAPIST

## 2024-04-01 NOTE — PROGRESS NOTES
Daily Note     Today's date: 2024  Patient name: Estefanía Dasilva  : 1950  MRN: [id number]  Referring provider: Deshaun Cardenas MD  Dx:   Encounter Diagnosis     ICD-10-CM    1. S/P ORIF (open reduction internal fixation) fracture  Z98.890     Z87.81       2. Closed fracture of proximal end of right humerus, unspecified fracture morphology, initial encounter  S42.201A                      Subjective: patient reports that by the end of the session - she is sore , but does admit that she is doing more at home now without issue.       Objective: See treatment diary below  AA flexion = 150  AA abd = 160  AA ER @ 90 abd = 70  AA IR @ 90 = 65    Assessment: Tolerated treatment well overall   increased AAROM noted all planes.       Plan: Continue per plan of care.    Patient encouraged to schedule MD appointment by end       Precautions: s/p R shoulder ORIF 10-25-23      POC expires Unit limit Auth Expiration date PT/OT/ST + Visit Limit?   24 bomn pending bomn                           Visit/Unit Tracking  AUTH Status:  Date 3/6 3/7 3/11 3/14 3/18 3/21 3/28 4/1       Pending  Used 1 2 3 4 5 6 7 8        Remaining                      Manuals 3/6 3/7 3/11 3/14 3/18 3/21 3/25 3/28 4/1                 PROM Right shoulder  db LA db db db db KB LA db                                                        Neuro Re-Ed                          Supine AA flex/ abd   :03  20x each 10 x 2  10 x 2 A 10 x 2 A- AA 10 x 2  A- AA 10 x 2  10x2 A x 20     R UE wall slides:  flexion and abduction  :10  5x each 10s x 5 ea  10s x 5  10sx5 10s x 5  10s x 5  :10  5x each 10s x 5 ea                                                         Ther Ex             UBE 2'/2' 2'/2' 3'/3' 4'/4' 4'/4' 4'/4' 4'/4' 4'/4' 5'/5'    pulleys   5'  5'  5' 5' 5' 5' 5'    TB OH flexion assist Rtb 5s x 20 RTB  :05  20x Grn x 10 10s  Grn x 20  10s Grn 10s x 10  Grn 10s x 10  Grn 10s x 10  GTB  :10  10x2 BTB 10s x 30     Ball rolls cw/ccw           Gmb x 20 ea     Supine cane flexion/ CP  :03  20x 1# x 20 ea  1# x 20  2# x 20 ea  2# x 20 ea  2# x 20 ea  2#  20x  3# x 20     Active CP supine         1# r, 2# l x20 ea                 Ball roll outs 3 way  :05  5x each 10s x 5 ea  10s x 5  10s x 5 ea  10s x 5 ea  10s x 5 ea :10  5x each 10s x 5 ea                  Serratus/ triceps  :03  10x each  Vc's/AAROM 3s x 10 AA cueing  1#  aa  x 20  cueing 1# aa x 20 cues  1# x 20 ea  1# x 20 ea  1#  20x each 1# x 20 ea    MRE ir/er   10x                                                                              Ther Activity                                                      Gait Training                                         Modalities             MHP R shoulder prn  Pt def           Cp right shoulder   Pt def def 10'  def

## 2024-04-04 ENCOUNTER — OFFICE VISIT (OUTPATIENT)
Dept: PHYSICAL THERAPY | Facility: CLINIC | Age: 74
End: 2024-04-04
Payer: COMMERCIAL

## 2024-04-04 DIAGNOSIS — Z87.81 S/P ORIF (OPEN REDUCTION INTERNAL FIXATION) FRACTURE: Primary | ICD-10-CM

## 2024-04-04 DIAGNOSIS — Z98.890 S/P ORIF (OPEN REDUCTION INTERNAL FIXATION) FRACTURE: Primary | ICD-10-CM

## 2024-04-04 DIAGNOSIS — S42.201A CLOSED FRACTURE OF PROXIMAL END OF RIGHT HUMERUS, UNSPECIFIED FRACTURE MORPHOLOGY, INITIAL ENCOUNTER: ICD-10-CM

## 2024-04-04 PROCEDURE — 97110 THERAPEUTIC EXERCISES: CPT

## 2024-04-04 PROCEDURE — 97140 MANUAL THERAPY 1/> REGIONS: CPT

## 2024-04-04 NOTE — PROGRESS NOTES
Daily Note     Today's date: 2024  Patient name: Estefanía Dasilva  : 1950  MRN: [id number]  Referring provider: Deshaun Cardenas MD  Dx:   Encounter Diagnosis     ICD-10-CM    1. S/P ORIF (open reduction internal fixation) fracture  Z98.890     Z87.81       2. Closed fracture of proximal end of right humerus, unspecified fracture morphology, initial encounter  S42.201A                      Subjective: Patient reports stiffness, she thinks it is due to the weather.       Objective: See treatment diary below      Assessment: Cont with outlined POC. Cues for pacing, rep count, and form throughout session. No increased pain or discomfort. Patient demonstrated fatigue post treatment and would benefit from continued PT      Plan: Progress treatment as tolerated.       Precautions: s/p R shoulder ORIF 10-25-23      POC expires Unit limit Auth Expiration date PT/OT/ST + Visit Limit?   24 bomn pending bomn                           Visit/Unit Tracking  AUTH Status:  Date 3/6 3/7 3/11 3/14 3/18 3/21 3/28 4/1 4/      Pending  Used 1 2 3 4 5 6 7 8 9       Remaining                      Manuals 3/6 3/7 3/11 3/14 3/18 3/21 3/25 3/28 4/1 4/4                PROM Right shoulder  db LA db db db db KB LA db TRESSA                                                       Neuro Re-Ed                          Supine AA flex/ abd   :03  20x each 10 x 2  10 x 2 A 10 x 2 A- AA 10 x 2  A- AA 10 x 2  10x2 A x 20  A x20   R UE wall slides:  flexion and abduction  :10  5x each 10s x 5 ea  10s x 5  10sx5 10s x 5  10s x 5  :10  5x each 10s x 5 ea  10x 5 ea                                                        Ther Ex             UBE 2'/2' 2'/2' 3'/3' 4'/4' 4'/4' 4'/4' 4'/4' 4'/4' 5'/5' 5'/5'   pulleys   5'  5'  5' 5' 5' 5' 5' 5'   TB OH flexion assist Rtb 5s x 20 RTB  :05  20x Grn x 10 10s  Grn x 20  10s Grn 10s x 10  Grn 10s x 10  Grn 10s x 10  GTB  :10  10x2 BTB 10s x 30  BTB 10s x30   Ball rolls cw/ccw          Gmb x 20 ea  GMB  x30 ea   Supine cane flexion/ CP  :03  20x 1# x 20 ea  1# x 20  2# x 20 ea  2# x 20 ea  2# x 20 ea  2#  20x  3# x 20  3# x20    Active CP supine         1# r, 2# l x20 ea 1# R  2# L  X20 ea                Ball roll outs 3 way  :05  5x each 10s x 5 ea  10s x 5  10s x 5 ea  10s x 5 ea  10s x 5 ea :10  5x each 10s x 5 ea  10s x5 ea                Serratus/ triceps  :03  10x each  Vc's/AAROM 3s x 10 AA cueing  1#  aa  x 20  cueing 1# aa x 20 cues  1# x 20 ea  1# x 20 ea  1#  20x each 1# x 20 ea 1# x20 ea    MRE ir/er   10x                                                                              Ther Activity                                                      Gait Training                                         Modalities             MHP R shoulder prn  Pt def           Cp right shoulder   Pt def def 10'  def

## 2024-04-08 ENCOUNTER — TELEPHONE (OUTPATIENT)
Age: 74
End: 2024-04-08

## 2024-04-08 ENCOUNTER — OFFICE VISIT (OUTPATIENT)
Dept: PHYSICAL THERAPY | Facility: CLINIC | Age: 74
End: 2024-04-08
Payer: COMMERCIAL

## 2024-04-08 DIAGNOSIS — Z87.81 S/P ORIF (OPEN REDUCTION INTERNAL FIXATION) FRACTURE: Primary | ICD-10-CM

## 2024-04-08 DIAGNOSIS — Z98.890 S/P ORIF (OPEN REDUCTION INTERNAL FIXATION) FRACTURE: Primary | ICD-10-CM

## 2024-04-08 DIAGNOSIS — S42.201A CLOSED FRACTURE OF PROXIMAL END OF RIGHT HUMERUS, UNSPECIFIED FRACTURE MORPHOLOGY, INITIAL ENCOUNTER: ICD-10-CM

## 2024-04-08 PROCEDURE — 97112 NEUROMUSCULAR REEDUCATION: CPT | Performed by: PHYSICAL THERAPIST

## 2024-04-08 PROCEDURE — 97110 THERAPEUTIC EXERCISES: CPT | Performed by: PHYSICAL THERAPIST

## 2024-04-08 NOTE — PROGRESS NOTES
Daily Note     Today's date: 2024  Patient name: Estefanía Dasilva  : 1950  MRN: [id number]  Referring provider: Deshaun Cardenas MD  Dx:   Encounter Diagnosis     ICD-10-CM    1. S/P ORIF (open reduction internal fixation) fracture  Z98.890     Z87.81       2. Closed fracture of proximal end of right humerus, unspecified fracture morphology, initial encounter  S42.201A                      Subjective: Patient reports that she is lifting up to a gallon of water at home -  keeping it within tolerance      Objective: See treatment diary below      Assessment: Cont with outlined POC. AAROM with nice improvements today       Plan: Progress treatment as tolerated.       Precautions: s/p R shoulder ORIF 10-25-23      POC expires Unit limit Auth Expiration date PT/OT/ST + Visit Limit?   24 bomn pending bomn                           Visit/Unit Tracking  AUTH Status:  Date 3/6 3/7 3/11 3/14 3/18 3/21 3/28 4/1 4/4 4/8     Pending  Used 1 2 3 4 5 6 7 8 9 10      Remaining                      Manuals 4/8    3/18 3/21 3/25 3/28 4/1 4/                PROM Right shoulder  db    db db KB LA db TRESSA                                                       Neuro Re-Ed                          Supine AA flex/ abd  A 1# x 20     A 10 x 2 A- AA 10 x 2  A- AA 10 x 2  10x2 A x 20  A x20   R UE wall slides:  flexion and abduction 10s x 5 ea     10sx5 10s x 5  10s x 5  :10  5x each 10s x 5 ea  10x 5 ea                                                        Ther Ex             UBE 5'/5'    4'/4' 4'/4' 4'/4' 4'/4' 5'/5' 5'/5'   pulleys dc    5' 5' 5' 5' 5' 5'   TB OH flexion assist Blk 10s x 30     Grn 10s x 10  Grn 10s x 10  Grn 10s x 10  GTB  :10  10x2 BTB 10s x 30  BTB 10s x30   Ball rolls cw/ccw          Gmb x 20 ea  GMB x30 ea   Supine cane flexion/ CP DC    2# x 20 ea  2# x 20 ea  2# x 20 ea  2#  20x  3# x 20  3# x20                 Active CP supine 2# x 20 ea         1# r, 2# l x20 ea 1# R  2# L  X20 ea                 Ball roll outs 3 way DC    10s x 5 ea  10s x 5 ea  10s x 5 ea :10  5x each 10s x 5 ea  10s x5 ea                Serratus/ triceps 2# x 20 ea     1# aa x 20 cues  1# x 20 ea  1# x 20 ea  1#  20x each 1# x 20 ea 1# x20 ea    MRE ir/er                          Stand flex/ scap to 90 20x ea            Bicep curls/ BOR                                                                                              Ther Activity                                                    Gait Training                                         Modalities             MHP R shoulder prn             Cp right shoulder      def

## 2024-04-11 ENCOUNTER — HOSPITAL ENCOUNTER (OUTPATIENT)
Dept: GASTROENTEROLOGY | Facility: HOSPITAL | Age: 74
Setting detail: OUTPATIENT SURGERY
End: 2024-04-11
Attending: INTERNAL MEDICINE
Payer: COMMERCIAL

## 2024-04-11 ENCOUNTER — ANESTHESIA EVENT (OUTPATIENT)
Dept: GASTROENTEROLOGY | Facility: HOSPITAL | Age: 74
End: 2024-04-11

## 2024-04-11 ENCOUNTER — APPOINTMENT (OUTPATIENT)
Dept: PHYSICAL THERAPY | Facility: CLINIC | Age: 74
End: 2024-04-11
Payer: COMMERCIAL

## 2024-04-11 ENCOUNTER — ANESTHESIA (OUTPATIENT)
Dept: GASTROENTEROLOGY | Facility: HOSPITAL | Age: 74
End: 2024-04-11

## 2024-04-11 VITALS
OXYGEN SATURATION: 99 % | BODY MASS INDEX: 27.85 KG/M2 | SYSTOLIC BLOOD PRESSURE: 129 MMHG | WEIGHT: 157.19 LBS | HEART RATE: 73 BPM | RESPIRATION RATE: 18 BRPM | HEIGHT: 63 IN | DIASTOLIC BLOOD PRESSURE: 66 MMHG | TEMPERATURE: 97.5 F

## 2024-04-11 DIAGNOSIS — Z12.11 SCREENING FOR COLON CANCER: ICD-10-CM

## 2024-04-11 PROCEDURE — G0121 COLON CA SCRN NOT HI RSK IND: HCPCS | Performed by: INTERNAL MEDICINE

## 2024-04-11 RX ORDER — SODIUM CHLORIDE, SODIUM LACTATE, POTASSIUM CHLORIDE, CALCIUM CHLORIDE 600; 310; 30; 20 MG/100ML; MG/100ML; MG/100ML; MG/100ML
INJECTION, SOLUTION INTRAVENOUS CONTINUOUS PRN
Status: DISCONTINUED | OUTPATIENT
Start: 2024-04-11 | End: 2024-04-11

## 2024-04-11 RX ORDER — PROPOFOL 10 MG/ML
INJECTION, EMULSION INTRAVENOUS AS NEEDED
Status: DISCONTINUED | OUTPATIENT
Start: 2024-04-11 | End: 2024-04-11

## 2024-04-11 RX ORDER — LIDOCAINE HYDROCHLORIDE 10 MG/ML
INJECTION, SOLUTION EPIDURAL; INFILTRATION; INTRACAUDAL; PERINEURAL AS NEEDED
Status: DISCONTINUED | OUTPATIENT
Start: 2024-04-11 | End: 2024-04-11

## 2024-04-11 RX ADMIN — SODIUM CHLORIDE, SODIUM LACTATE, POTASSIUM CHLORIDE, CALCIUM CHLORIDE: 600; 310; 30; 20 INJECTION, SOLUTION INTRAVENOUS at 08:15

## 2024-04-11 RX ADMIN — PROPOFOL 50 MG: 10 INJECTION, EMULSION INTRAVENOUS at 08:32

## 2024-04-11 RX ADMIN — PROPOFOL 100 MG: 10 INJECTION, EMULSION INTRAVENOUS at 08:18

## 2024-04-11 RX ADMIN — PROPOFOL 50 MG: 10 INJECTION, EMULSION INTRAVENOUS at 08:26

## 2024-04-11 RX ADMIN — LIDOCAINE HYDROCHLORIDE 50 MG: 10 INJECTION, SOLUTION EPIDURAL; INFILTRATION; INTRACAUDAL; PERINEURAL at 08:18

## 2024-04-11 NOTE — ANESTHESIA PREPROCEDURE EVALUATION
Procedure:  COLONOSCOPY    Relevant Problems   No relevant active problems        Physical Exam    Airway    Mallampati score: II  TM Distance: >3 FB  Neck ROM: full     Dental       Cardiovascular  Cardiovascular exam normal    Pulmonary  Pulmonary exam normal     Other Findings  post-pubertal.      Anesthesia Plan  ASA Score- 1     Anesthesia Type- IV sedation with anesthesia with ASA Monitors.         Additional Monitors:     Airway Plan:            Plan Factors-Exercise tolerance (METS): >4 METS.    Chart reviewed. EKG reviewed. Imaging results reviewed. Existing labs reviewed. Patient summary reviewed.                  Induction- intravenous.    Postoperative Plan-     Informed Consent- Anesthetic plan and risks discussed with patient.  I personally reviewed this patient with the CRNA. Discussed and agreed on the Anesthesia Plan with the CRNA..

## 2024-04-11 NOTE — ANESTHESIA POSTPROCEDURE EVALUATION
Post-Op Assessment Note    CV Status:  Stable    Pain management: adequate       Mental Status:  Arousable and sleepy   Hydration Status:  Euvolemic   PONV Controlled:  Controlled   Airway Patency:  Patent     Post Op Vitals Reviewed: Yes    No anethesia notable event occurred.    Staff: CRNA               BP   148/80   Temp      Pulse 80   Resp 18   SpO2 98% RA

## 2024-04-11 NOTE — DISCHARGE INSTRUCTIONS
Colonoscopy   WHAT YOU NEED TO KNOW:   A colonoscopy is a procedure to examine the inside of your colon (intestine) with a scope. Polyps or tissue growths may have been removed during your colonoscopy. It is normal to feel bloated and to have some abdominal discomfort. You should be passing gas. If you have hemorrhoids or you had polyps removed, you may have a small amount of bleeding.        DISCHARGE INSTRUCTIONS:   Seek care immediately if:   You have sudden, severe abdominal pain.     You have problems swallowing.     You have a large amount of black, sticky bowel movements or blood in your bowel movements.     You have sudden trouble breathing.     You feel weak, lightheaded, or faint or your heart beats faster than normal for you.     Contact your healthcare provider if:   You have a fever and chills.      You have nausea or are vomiting.      Your abdomen is bloated or feels full and hard.     You have abdominal pain.   You have black, sticky bowel movements or blood in your bowel movements.  You have not had a bowel movement for 3 days after your procedure.  You have rash or hives.  You have questions or concerns about your procedure.    Activity:   Do not lift, strain, or run for 24 hours after your procedure.     Rest after your procedure. You have been given medicine to relax you. Do not drive or make important decisions until the day after your procedure. Return to your normal activity as directed.     Relieve gas and discomfort from bloating by lying on your right side with a heating pad on your abdomen. You may need to take short walks to help the gas move out. Eat small meals until bloating is relieved.  Follow up with your healthcare provider as directed: Write down your questions so you remember to ask them during your visits.     If you take a “blood thinner”, please review the specific instructions from your endoscopist about when you should resume it. These can be found in the “Recommendation”  and “Your Medication list” sections of this After Visit Summary.

## 2024-04-11 NOTE — H&P
History and Physical -  [facility]  Estefanía Dasilva 73 y.o. female MRN: [id number]                  HPI: Estefanía Dasilva is a 73 y.o. year old female who presents for colonoscopy for colon cancer screening      REVIEW OF SYSTEMS: Per the HPI, and otherwise unremarkable.    Historical Information   Past Medical History:   Diagnosis Date    Seasonal allergies      Past Surgical History:   Procedure Laterality Date     SECTION      COMPLEX WOUND CLOSURE TO EXTREMITY Bilateral 2019    Procedure: COMPLEX WOUND CLOSURE, RIGHT BACK;  Surgeon: Seamus Mckoy MD;  Location: [facility];  Service: Plastics    FULL THICKNESS SKIN GRAFT N/A 2019    Procedure: SKIN GRAFT FULL THICKNESS  (FTSG) EXTREMITY;  Surgeon: Seamus Mckoy MD;  Location: [facility];  Service: Plastics    MASS EXCISION N/A 2019    Procedure: EXCISION  BIOPSY LESION/MASS BACK;  Surgeon: Abel Harvey MD;  Location: [facility];  Service: General    MA OPTX GREATER HUMERAL TUBEROSITY FX W/INT FIXJ Right 10/25/2023    Procedure: OPEN REDUCTION W/ INTERNAL FIXATION (ORIF) RIGHT PROX HUMERUS-GREATER TUBEROSITY FRACTURE;  Surgeon: Deshaun Cardenas MD;  Location: [facility];  Service: Orthopedics    SKIN LESION EXCISION Bilateral 2019    Procedure: EXCISION WIDE LESION RIGHT BACK;  Surgeon: Rafael Carter MD;  Location: [facility];  Service: Surgical Oncology     Social History   Social History     Substance and Sexual Activity   Alcohol Use Not Currently     Social History     Substance and Sexual Activity   Drug Use Yes    Types: Marijuana    Comment: medical marijuana     Social History     Tobacco Use   Smoking Status Former    Types: Cigarettes   Smokeless Tobacco Never   Tobacco Comments    no cig use as of 21 , using rare CBD oil vapor     Family History   Problem Relation Age of Onset    Heart disease Mother     Bone cancer Father     Cancer Brother         neuroblastoma       Meds/Allergies     (Not in a  "hospital admission)      No Known Allergies    Objective     Blood pressure 157/72, pulse 79, temperature 97.5 °F (36.4 °C), temperature source Temporal, resp. rate 18, height 5' 3\" (1.6 m), weight 71.3 kg (157 lb 3 oz), SpO2 97%.      PHYSICAL EXAM    /72   Pulse 79   Temp 97.5 °F (36.4 °C) (Temporal)   Resp 18   Ht 5' 3\" (1.6 m)   Wt 71.3 kg (157 lb 3 oz)   SpO2 97%   BMI 27.84 kg/m²       Gen: NAD  CV: RRR  CHEST: Clear  ABD: soft, NT/ND  EXT: no edema      ASSESSMENT/PLAN:  This is a 73 y.o. year old female here for colonoscopy, and she is stable and optimized for her procedure.        "

## 2024-04-15 ENCOUNTER — OFFICE VISIT (OUTPATIENT)
Dept: PHYSICAL THERAPY | Facility: CLINIC | Age: 74
End: 2024-04-15
Payer: COMMERCIAL

## 2024-04-15 DIAGNOSIS — Z87.81 S/P ORIF (OPEN REDUCTION INTERNAL FIXATION) FRACTURE: Primary | ICD-10-CM

## 2024-04-15 DIAGNOSIS — Z98.890 S/P ORIF (OPEN REDUCTION INTERNAL FIXATION) FRACTURE: Primary | ICD-10-CM

## 2024-04-15 PROCEDURE — 97110 THERAPEUTIC EXERCISES: CPT | Performed by: PHYSICAL THERAPIST

## 2024-04-15 PROCEDURE — 97112 NEUROMUSCULAR REEDUCATION: CPT | Performed by: PHYSICAL THERAPIST

## 2024-04-15 NOTE — PROGRESS NOTES
Daily Note     Today's date: 4/15/2024  Patient name: Estefanía Dasilva  : 1950  MRN: [id number]  Referring provider: Deshaun Cardenas MD  Dx:   Encounter Diagnosis     ICD-10-CM    1. S/P ORIF (open reduction internal fixation) fracture  Z98.890     Z87.81                      Subjective: Patient feels that she continues to improve    notes fatigue post session with increases       Objective: See treatment diary below      Assessment: Cont with program -  increased time with movements A/AA vs gravity       Plan: Progress treatment as tolerated.       Precautions: s/p R shoulder ORIF 10-25-23      POC expires Unit limit Auth Expiration date PT/OT/ST + Visit Limit?   24 bomn pending bomn                           Visit/Unit Tracking  AUTH Status:  Date 3/6 3/7 3/11 3/14 3/18 3/21 3/28 4/1 4/4 4/8 4/15    Pending  Used 1 2 3 4 5 6 7 8 9 10 11- foto     Remaining                      Manuals 4/8 4/15   3/18 3/21 3/25 3/28 4/1 4                PROM Right shoulder  db db   db db KB LA db TRESSA                                                       Neuro Re-Ed                          Supine AA flex/ abd  A 1# x 20  20 x ea    A 10 x 2 A- AA 10 x 2  A- AA 10 x 2  10x2 A x 20  A x20   R UE wall slides:  flexion and abduction 10s x 5 ea  10s x 5    10sx5 10s x 5  10s x 5  :10  5x each 10s x 5 ea  10x 5 ea                 Seated place and hold   Scap x 4 min                                      Ther Ex             UBE 5'/5' 5/5'   4'/4' 4'/4' 4'/4' 4'/4' 5'/5' 5'/5'                TB OH flexion assist Blk 10s x 30  Blk 10s x 10   Grn 10s x 10  Grn 10s x 10  Grn 10s x 10  GTB  :10  10x2 BTB 10s x 30  BTB 10s x30   Ball rolls cw/ccw   Gmb x 20 ea       Gmb x 20 ea  GMB x30 ea                             Active CP supine 2# x 20 ea  2# x 20        1# r, 2# l x20 ea 1# R  2# L  X20 ea                                          Serratus/ triceps 2# x 20 ea  2# x 20 ea    1# aa x 20 cues  1# x 20 ea  1# x 20 ea  1#  20x  each 1# x 20 ea 1# x20 ea    MRE ir/er                          Stand flex/ scap to 90 20x ea 20x ea            Bicep curls/ BOR   2# x 20 ea                                                                                            Ther Activity                                                    Gait Training                                         Modalities             MHP R shoulder prn             Cp right shoulder      def

## 2024-04-18 ENCOUNTER — OFFICE VISIT (OUTPATIENT)
Dept: PHYSICAL THERAPY | Facility: CLINIC | Age: 74
End: 2024-04-18
Payer: COMMERCIAL

## 2024-04-18 DIAGNOSIS — Z87.81 S/P ORIF (OPEN REDUCTION INTERNAL FIXATION) FRACTURE: Primary | ICD-10-CM

## 2024-04-18 DIAGNOSIS — Z98.890 S/P ORIF (OPEN REDUCTION INTERNAL FIXATION) FRACTURE: Primary | ICD-10-CM

## 2024-04-18 DIAGNOSIS — S42.201A CLOSED FRACTURE OF PROXIMAL END OF RIGHT HUMERUS, UNSPECIFIED FRACTURE MORPHOLOGY, INITIAL ENCOUNTER: ICD-10-CM

## 2024-04-18 PROCEDURE — 97110 THERAPEUTIC EXERCISES: CPT

## 2024-04-18 PROCEDURE — 97140 MANUAL THERAPY 1/> REGIONS: CPT

## 2024-04-18 PROCEDURE — 97112 NEUROMUSCULAR REEDUCATION: CPT

## 2024-04-18 NOTE — PROGRESS NOTES
"Daily Note     Today's date: 2024  Patient name: Estefanía Dasilva  : 1950  MRN: [id number]  Referring provider: Deshaun Cardenas MD  Dx:   Encounter Diagnosis     ICD-10-CM    1. S/P ORIF (open reduction internal fixation) fracture  Z98.890     Z87.81       2. Closed fracture of proximal end of right humerus, unspecified fracture morphology, initial encounter  S42.201A                      Subjective: \"I think it's better, I use it a lot at home\".  Patient reported utilizing right UE for reaching, putting dishes away and gardening.  Patient also reported daily + compliance with present HEP.  Patient noted discomfort at ed ROM, most especially shoulder flexion.       Objective: See treatment diary below      Assessment: Tolerated treatment  demonstrating marked impoved right shoulder AROM/AAROM in all planes of motion . Improved ability to perform tasks against gravity and with resistance. Patient would benefit from continued PT      Plan: Continue per plan of care.      Precautions: s/p R shoulder ORIF 10-25-23      POC expires Unit limit Auth Expiration date PT/OT/ST + Visit Limit?   24 bomn pending bomn                           Visit/Unit Tracking  AUTH Status:  Date 3/6 3/7 3/11 3/14 3/18 3/21 3/28 4/1 4/4 4/8 4/15 4/18   Pending  Used 1 2 3 4 5 6 7 8 9 10 11- foto 12    Remaining                      Manuals 4/8 4/15 4/18    3/25 3/28 4/1 4/4                PROM Right shoulder  db db LA    KB LA db TRESSA                                                       Neuro Re-Ed                          Supine AA flex/ abd  A 1# x 20  20 x ea  10x2 each    A- AA 10 x 2  10x2 A x 20  A x20   R UE wall slides:  flexion and abduction 10s x 5 ea  10s x 5  :10  5x each    10s x 5  :10  5x each 10s x 5 ea  10x 5 ea                 Seated place and hold   Scap x 4 min  Scaption AAROM  TTx4'                                    Ther Ex             UBE 5'/5' 5/5' 5'/5'    4'/4' 4'/4' 5'/5' 5'/5'                TB " OH flexion assist Blk 10s x 30  Blk 10s x 10 Blk  :10  10x    Grn 10s x 10  GTB  :10  10x2 BTB 10s x 30  BTB 10s x30   Ball rolls cw/ccw   Gmb x 20 ea GMB  20x each      Gmb x 20 ea  GMB x30 ea                             Active CP supine 2# x 20 ea  2# x 20  2#  20x      1# r, 2# l x20 ea 1# R  2# L  X20 ea                                          Serratus/ triceps 2# x 20 ea  2# x 20 ea  2#  20x each    1# x 20 ea  1#  20x each 1# x 20 ea 1# x20 ea    MRE ir/er                          Stand flex/ scap to 90 20x ea 20x ea  20x  each          Bicep curls/ BOR   2# x 20 ea  2#  20x                                                                                        Ther Activity                                                    Gait Training                                         Modalities             MHP R shoulder prn             Cp right shoulder

## 2024-04-22 ENCOUNTER — OFFICE VISIT (OUTPATIENT)
Dept: PHYSICAL THERAPY | Facility: CLINIC | Age: 74
End: 2024-04-22
Payer: COMMERCIAL

## 2024-04-22 DIAGNOSIS — S42.201A CLOSED FRACTURE OF PROXIMAL END OF RIGHT HUMERUS, UNSPECIFIED FRACTURE MORPHOLOGY, INITIAL ENCOUNTER: ICD-10-CM

## 2024-04-22 DIAGNOSIS — Z98.890 S/P ORIF (OPEN REDUCTION INTERNAL FIXATION) FRACTURE: Primary | ICD-10-CM

## 2024-04-22 DIAGNOSIS — Z87.81 S/P ORIF (OPEN REDUCTION INTERNAL FIXATION) FRACTURE: Primary | ICD-10-CM

## 2024-04-22 PROCEDURE — 97112 NEUROMUSCULAR REEDUCATION: CPT | Performed by: PHYSICAL THERAPIST

## 2024-04-22 PROCEDURE — 97110 THERAPEUTIC EXERCISES: CPT | Performed by: PHYSICAL THERAPIST

## 2024-04-22 NOTE — PROGRESS NOTES
Daily Note     Today's date: 2024  Patient name: Estefanía Dasilva  : 1950  MRN: [id number]  Referring provider: Deshaun Cardenas MD  Dx:   Encounter Diagnosis     ICD-10-CM    1. S/P ORIF (open reduction internal fixation) fracture  Z98.890     Z87.81       2. Closed fracture of proximal end of right humerus, unspecified fracture morphology, initial encounter  S42.201A                      Subjective: patient without new c/o today -  states that she feels good  feels that she is using her a lot more with greater ease.       Objective: See treatment diary below      Assessment: Tolerated treatment well overall    nice improvements today with aa movements    Plan: Continue per plan of care.      Precautions: s/p R shoulder ORIF 10-25-23      POC expires Unit limit Auth Expiration date PT/OT/ST + Visit Limit?   24 bomn pending bomn                           Visit/Unit Tracking  AUTH Status:  Date 4/22   3/14 3/18 3/21 3/28 4/1 4/4 4/8 4/15 4/18   Pending  Used 13   4 5 6 7 8 9 10 11- foto 12    Remaining                      Manuals 4/8 4/15 4/18 4/22   3/25 3/28 4/1 4                PROM Right shoulder  db db LA db   KB LA db TRESSA                                                       Neuro Re-Ed                          Supine AA flex/ abd  A 1# x 20  20 x ea  10x2 each 1# x 20 ea    A- AA 10 x 2  10x2 A x 20  A x20   R UE wall slides:  flexion and abduction 10s x 5 ea  10s x 5  :10  5x each 10s x 5 ea   10s x 5  :10  5x each 10s x 5 ea  10x 5 ea                 Seated place and hold   Scap x 4 min  Scaption AAROM  TTx4' X 3 min                                    Ther Ex             UBE 5'/5' 5/5' 5'/5' 5'/5'   4'/4' 4'/4' 5'/5' 5'/5'                TB OH flexion assist Blk 10s x 30  Blk 10s x 10 Blk  :10  10x Blk 10s x 20   Grn 10s x 10  GTB  :10  10x2 BTB 10s x 30  BTB 10s x30   Ball rolls cw/ccw   Gmb x 20 ea GMB  20x each Gmb x 20 ea     Gmb x 20 ea  GMB x30 ea                              Active CP supine 2# x 20 ea  2# x 20  2#  20x 2# x 20      1# r, 2# l x20 ea 1# R  2# L  X20 ea   Wall arc    20x self aa prn                                   Serratus/ triceps 2# x 20 ea  2# x 20 ea  2#  20x each 2# x 20    1# x 20 ea  1#  20x each 1# x 20 ea 1# x20 ea    MRE ir/er                          Stand flex/ scap to 90 20x ea 20x ea  20x  each 0# x 20          Bicep curls/ BOR   2# x 20 ea  2#  20x 2# x 20                                                                                        Ther Activity                                                    Gait Training                                         Modalities             MHP R shoulder prn             Cp right shoulder

## 2024-04-25 ENCOUNTER — OFFICE VISIT (OUTPATIENT)
Dept: PHYSICAL THERAPY | Facility: CLINIC | Age: 74
End: 2024-04-25
Payer: COMMERCIAL

## 2024-04-25 DIAGNOSIS — Z98.890 S/P ORIF (OPEN REDUCTION INTERNAL FIXATION) FRACTURE: Primary | ICD-10-CM

## 2024-04-25 DIAGNOSIS — S42.201A CLOSED FRACTURE OF PROXIMAL END OF RIGHT HUMERUS, UNSPECIFIED FRACTURE MORPHOLOGY, INITIAL ENCOUNTER: ICD-10-CM

## 2024-04-25 DIAGNOSIS — Z87.81 S/P ORIF (OPEN REDUCTION INTERNAL FIXATION) FRACTURE: Primary | ICD-10-CM

## 2024-04-25 PROCEDURE — 97110 THERAPEUTIC EXERCISES: CPT

## 2024-04-25 PROCEDURE — 97112 NEUROMUSCULAR REEDUCATION: CPT

## 2024-04-25 NOTE — PROGRESS NOTES
"Daily Note     Today's date: 2024  Patient name: Estefanía Dasilva  : 1950  MRN: [id number]  Referring provider: Deshaun Cardenas MD  Dx:   Encounter Diagnosis     ICD-10-CM    1. S/P ORIF (open reduction internal fixation) fracture  Z98.890     Z87.81       2. Closed fracture of proximal end of right humerus, unspecified fracture morphology, initial encounter  S42.201A           Start Time: 1212          Subjective: \"I walk the dog with that (right) arm\".  \"It's (right UE) doing a lot better!\"  Patient reported gardening and ADL's without limitations or reported discomfort.       Objective: See treatment diary below      Assessment: Tolerated treatment well. VC's for over recruitment of scapular elevation as compensatory response. Patient exhibited good technique with therapeutic exercises and would benefit from continued PT      Plan: Continue per plan of care.  Progress treatment as tolerated.       Precautions: s/p R shoulder ORIF 10-25-23      POC expires Unit limit Auth Expiration date PT/OT/ST + Visit Limit?   24 bomn pending bomn                           Visit/Unit Tracking  AUTH Status:  Date 4/22 4/25     3/28 4/1 4/4 4/8 4/15 4/18   Pending  Used 13 14     7 8 9 10 11- foto 12    Remaining                      Manuals 4/8 4/15 4/18 4/22 4/25    4/1 4/4                PROM Right shoulder  db db LA db LA    db TRESSA                                                       Neuro Re-Ed                          Supine AA flex/ abd  A 1# x 20  20 x ea  10x2 each 1# x 20 ea      A x 20  A x20   R UE wall slides:  flexion and abduction 10s x 5 ea  10s x 5  :10  5x each 10s x 5 ea :10  5x each    10s x 5 ea  10x 5 ea                 Seated place and hold   Scap x 4 min  Scaption AAROM  TTx4' X 3 min  x3'                                  Ther Ex             UBE 5'/5' 5/5' 5'/5' 5'/5' 5'/5'    5'/5' 5'/5'                TB OH flexion assist Blk 10s x 30  Blk 10s x 10 Blk  :10  10x Blk 10s x 20 " BLK  :10  20x     BTB 10s x 30  BTB 10s x30   Ball rolls cw/ccw   Gmb x 20 ea GMB  20x each Gmb x 20 ea GMB  20x each    Gmb x 20 ea  GMB x30 ea                             Active CP supine 2# x 20 ea  2# x 20  2#  20x 2# x 20  2#  20x     1# r, 2# l x20 ea 1# R  2# L  X20 ea   Wall arc    20x self aa prn 20x self AAROM                                  Serratus/ triceps 2# x 20 ea  2# x 20 ea  2#  20x each 2# x 20  2#  20x    1# x 20 ea 1# x20 ea    MRE ir/er                          Stand flex/ scap to 90 20x ea 20x ea  20x  each 0# x 20  0#  10x2  vc's        Bicep curls/ BOR   2# x 20 ea  2#  20x 2# x 20  2#  20x                                                                                       Ther Activity                                                    Gait Training                                         Modalities             MHP R shoulder prn             Cp right shoulder

## 2024-04-29 ENCOUNTER — OFFICE VISIT (OUTPATIENT)
Dept: PHYSICAL THERAPY | Facility: CLINIC | Age: 74
End: 2024-04-29
Payer: COMMERCIAL

## 2024-04-29 DIAGNOSIS — Z87.81 S/P ORIF (OPEN REDUCTION INTERNAL FIXATION) FRACTURE: Primary | ICD-10-CM

## 2024-04-29 DIAGNOSIS — Z98.890 S/P ORIF (OPEN REDUCTION INTERNAL FIXATION) FRACTURE: Primary | ICD-10-CM

## 2024-04-29 DIAGNOSIS — S42.201A CLOSED FRACTURE OF PROXIMAL END OF RIGHT HUMERUS, UNSPECIFIED FRACTURE MORPHOLOGY, INITIAL ENCOUNTER: ICD-10-CM

## 2024-04-29 PROCEDURE — 97110 THERAPEUTIC EXERCISES: CPT | Performed by: PHYSICAL THERAPIST

## 2024-04-29 PROCEDURE — 97112 NEUROMUSCULAR REEDUCATION: CPT | Performed by: PHYSICAL THERAPIST

## 2024-04-29 NOTE — PROGRESS NOTES
Daily Note     Today's date: 2024  Patient name: Estefnaía Dasilva  : 1950  MRN: [id number]  Referring provider: Deshaun Cardenas MD  Dx:   Encounter Diagnosis     ICD-10-CM    1. S/P ORIF (open reduction internal fixation) fracture  Z98.890     Z87.81       2. Closed fracture of proximal end of right humerus, unspecified fracture morphology, initial encounter  S42.201A                      Subjective: patient reports that she is using her right arm more and more at home - always trying to push/ pull and reach with it    feels that she is making progress towards improving her overall function.   Notes pain /discomfort with manual stretching today     Objective: See treatment diary below      Assessment: Tolerated treatment fair - much vc for technique/ posturing    significant m. Atrophy right deltoid/ rtc       Plan: Continue per plan of care.  Progress treatment as tolerated.     Patient to contact MD today for 3 month f/u     Precautions: s/p R shoulder ORIF 10-25-23      POC expires Unit limit Auth Expiration date PT/OT/ST + Visit Limit?   24 bomn pending bomn                           Visit/Unit Tracking  AUTH Status:  Date 4/22 4/25 4/29    3/28 4/1 4/4 4/8 4/15 4/18   Pending  Used 13 14 15    7 8 9 10 11- foto 12    Remaining                      Manuals 4/8 4/15 4/18 4/22 4/25 4/29   4/1 4/4                PROM Right shoulder  db db LA db LA db   db TRESSA                                                       Neuro Re-Ed                          Supine AA flex/ abd  A 1# x 20  20 x ea  10x2 each 1# x 20 ea   20x ea   A x 20  A x20   R UE wall slides:  flexion and abduction 10s x 5 ea  10s x 5  :10  5x each 10s x 5 ea :10  5x each 10s x 5 ea   10s x 5 ea  10x 5 ea                 Seated place and hold   Scap x 4 min  Scaption AAROM  TTx4' X 3 min  x3'                                  Ther Ex             UBE 5'/5' 5/5' 5'/5' 5'/5' 5'/5' 5/'5'   5'/5' 5'/5'                TB OH flexion assist  Blk 10s x 30  Blk 10s x 10 Blk  :10  10x Blk 10s x 20 BLK  :10  20x  Web blue 10s x 10    BTB 10s x 30  BTB 10s x30   Ball rolls cw/ccw   Gmb x 20 ea GMB  20x each Gmb x 20 ea GMB  20x each Gmb x 20 ea   Gmb x 20 ea  GMB x30 ea                             Active CP supine 2# x 20 ea  2# x 20  2#  20x 2# x 20  2#  20x  3# x 20    1# r, 2# l x20 ea 1# R  2# L  X20 ea   Wall arc    20x self aa prn 20x self AAROM                                  Serratus/ triceps 2# x 20 ea  2# x 20 ea  2#  20x each 2# x 20  2#  20x 3# aa prn x 20    1# x 20 ea 1# x20 ea    MRE ir/er                          Stand flex/ scap to 90 20x ea 20x ea  20x  each 0# x 20  0#  10x2  vc's 20x  vc       Bicep curls/ BOR   2# x 20 ea  2#  20x 2# x 20  2#  20x  3# x 20 ea                     TB lpd , rows       Web blue x 20 ea                                                           Ther Activity                                                    Gait Training                                         Modalities             MHP R shoulder prn             Cp right shoulder

## 2024-05-02 ENCOUNTER — OFFICE VISIT (OUTPATIENT)
Dept: PHYSICAL THERAPY | Facility: CLINIC | Age: 74
End: 2024-05-02
Payer: COMMERCIAL

## 2024-05-02 DIAGNOSIS — Z98.890 S/P ORIF (OPEN REDUCTION INTERNAL FIXATION) FRACTURE: Primary | ICD-10-CM

## 2024-05-02 DIAGNOSIS — S42.201A CLOSED FRACTURE OF PROXIMAL END OF RIGHT HUMERUS, UNSPECIFIED FRACTURE MORPHOLOGY, INITIAL ENCOUNTER: ICD-10-CM

## 2024-05-02 DIAGNOSIS — Z87.81 S/P ORIF (OPEN REDUCTION INTERNAL FIXATION) FRACTURE: Primary | ICD-10-CM

## 2024-05-02 PROCEDURE — 97112 NEUROMUSCULAR REEDUCATION: CPT

## 2024-05-02 PROCEDURE — 97110 THERAPEUTIC EXERCISES: CPT

## 2024-05-02 NOTE — PROGRESS NOTES
"Daily Note     Today's date: 2024  Patient name: Estefanía Dasilva  : 1950  MRN: [id number]  Referring provider: Deshaun Cardenas MD  Dx:   Encounter Diagnosis     ICD-10-CM    1. S/P ORIF (open reduction internal fixation) fracture  Z98.890     Z87.81       2. Closed fracture of proximal end of right humerus, unspecified fracture morphology, initial encounter  S42.201A           Start Time: 1117          Subjective: Patient indicated discomfort at end range flexion and abduction.  Patient reported she has been gardening without aggravating her shoulder pain.  Patient noted she \"will be mulching\" after therapy today.      Objective: See treatment diary below      Assessment: Tolerated treatment  requiring 100% vc's for technique, posture, and pacing.  VC's also needed to decrease compensatory strategies; over recruitment of trapezius and scapular musculature . Patient appeared challenged with today's progressions. Patient demonstrated fatigue post treatment and would benefit from continued PT      Plan: Continue per plan of care 1x/week.  MD f/u Tuesday with Dr. Cardenas.     Precautions: s/p R shoulder ORIF 10-25-23      POC expires Unit limit Auth Expiration date PT/OT/ST + Visit Limit?   24 bomn pending bomn                           Visit/Unit Tracking  AUTH Status:  Date 4/22 4/25 4/29 5/2   3/28 4/1 4/4 4/8 4/15 4/18   Pending  Used 13 14 15 16   7 8 9 10 11- foto 12    Remaining                      Manuals 4/8 4/15 4/18 4/22 4/25 4/29 5/2  4/1 4/4                PROM Right shoulder  db db LA db LA db LA  db TRESSA                                                       Neuro Re-Ed                          Supine AA flex/ abd  A 1# x 20  20 x ea  10x2 each 1# x 20 ea   20x ea 20x each  A x 20  A x20   R UE wall slides:  flexion and abduction 10s x 5 ea  10s x 5  :10  5x each 10s x 5 ea :10  5x each 10s x 5 ea :10  5x each  10s x 5 ea  10x 5 ea                 Seated place and hold   Scap x 4 min  " "Scaption AAROM  TTx4' X 3 min  x3'        Wall slide \"alphabet\"        1x  vc's      Wall slide R arc       10x2  vc's      Ther Ex             UBE 5'/5' 5/5' 5'/5' 5'/5' 5'/5' 5/'5' 5'/5'  5'/5' 5'/5'                TB OH flexion assist Blk 10s x 30  Blk 10s x 10 Blk  :10  10x Blk 10s x 20 BLK  :10  20x  Web blue 10s x 10  BTB  :10  10x  BTB 10s x 30  BTB 10s x30   Ball rolls cw/ccw   Gmb x 20 ea GMB  20x each Gmb x 20 ea GMB  20x each Gmb x 20 ea GMB  20x each  Gmb x 20 ea  GMB x30 ea                             Active CP supine 2# x 20 ea  2# x 20  2#  20x 2# x 20  2#  20x  3# x 20  3#  20x  1# r, 2# l x20 ea 1# R  2# L  X20 ea   Wall arc    20x self aa prn 20x self AAROM                                  Serratus/ triceps 2# x 20 ea  2# x 20 ea  2#  20x each 2# x 20  2#  20x 3# aa prn x 20  3#  10x each    1# x 20 ea 1# x20 ea    MRE ir/er                          Stand flex/ scap to 90 20x ea 20x ea  20x  each 0# x 20  0#  10x2  vc's 20x  vc 20x each   vc's      Bicep curls/ BOR   2# x 20 ea  2#  20x 2# x 20  2#  20x  3# x 20 ea  3#  20x each                   TB lpd , rows       Web blue x 20 ea & shld ext  BTB  20x each      TB ER (no money)       BTB  10x2                                             Ther Activity                                                    Gait Training                                         Modalities             MHP R shoulder prn             Cp right shoulder                               "

## 2024-05-04 DIAGNOSIS — Z98.890 S/P ORIF (OPEN REDUCTION INTERNAL FIXATION) FRACTURE: Primary | ICD-10-CM

## 2024-05-04 DIAGNOSIS — Z87.81 S/P ORIF (OPEN REDUCTION INTERNAL FIXATION) FRACTURE: Primary | ICD-10-CM

## 2024-05-07 ENCOUNTER — OFFICE VISIT (OUTPATIENT)
Dept: OBGYN CLINIC | Facility: CLINIC | Age: 74
End: 2024-05-07
Payer: COMMERCIAL

## 2024-05-07 ENCOUNTER — APPOINTMENT (OUTPATIENT)
Dept: RADIOLOGY | Facility: CLINIC | Age: 74
End: 2024-05-07
Payer: COMMERCIAL

## 2024-05-07 VITALS
WEIGHT: 158.4 LBS | BODY MASS INDEX: 28.07 KG/M2 | HEART RATE: 96 BPM | HEIGHT: 63 IN | SYSTOLIC BLOOD PRESSURE: 168 MMHG | DIASTOLIC BLOOD PRESSURE: 107 MMHG

## 2024-05-07 DIAGNOSIS — Z98.890 S/P ORIF (OPEN REDUCTION INTERNAL FIXATION) FRACTURE: Primary | ICD-10-CM

## 2024-05-07 DIAGNOSIS — Z98.890 S/P ORIF (OPEN REDUCTION INTERNAL FIXATION) FRACTURE: ICD-10-CM

## 2024-05-07 DIAGNOSIS — Z87.81 S/P ORIF (OPEN REDUCTION INTERNAL FIXATION) FRACTURE: Primary | ICD-10-CM

## 2024-05-07 DIAGNOSIS — Z87.81 S/P ORIF (OPEN REDUCTION INTERNAL FIXATION) FRACTURE: ICD-10-CM

## 2024-05-07 PROCEDURE — 99213 OFFICE O/P EST LOW 20 MIN: CPT | Performed by: ORTHOPAEDIC SURGERY

## 2024-05-07 PROCEDURE — 73030 X-RAY EXAM OF SHOULDER: CPT

## 2024-05-07 RX ORDER — LATANOPROST 50 UG/ML
1 SOLUTION/ DROPS OPHTHALMIC DAILY
COMMUNITY
Start: 2024-02-19

## 2024-05-07 NOTE — PROGRESS NOTES
Orthopaedics Office Visit - Post-op Patient Visit    ASSESSMENT/PLAN:    Assessment:   12 weeks status post right proximal humerus greater tuberosity fracture ORIF, DOS 10/25/2023  Able to forward flex past neutral, greater PROM than AROM    Plan:   X-rays reviewed and discussed with patient.  Patient to be full weightbearing to  RUE (Right Upper Extremity)  ROM as tolerated to RUE (Right Upper Extremity)  Discussed with patient she may be discharged from physical therapy at this time. She is to continue with her home exercise plan. Continue with ADL's as tolerated.  Patient to continue at home analgesic regimen with Tylenol   Patient to follow up in 6 months       To Do Next Visit:  Re-evaluation, x-ray right shoulder    _____________________________________________________  CHIEF COMPLAINT:  Chief Complaint   Patient presents with   • Right Arm - Follow-up         SUBJECTIVE:  Estefanía Dasilva is a 73 y.o. female who presents 6.5 months s/p right humerus fracture ORIF, DOS 10/25/2023.  The patient is doing well overall.  The patient has resumed driving.  She has resumed gardening and has been using an auger to assist with planting.  The patient has been continuing with physical therapy and has 1 more session scheduled.  The patient notes she can lift 30 pounds.  Patient uses Tylenol as needed for symptom management.      SOCIAL HISTORY:  Social History     Tobacco Use   • Smoking status: Former     Types: Cigarettes   • Smokeless tobacco: Never   • Tobacco comments:     no cig use as of 8/12/19 , using rare CBD oil vapor   Substance Use Topics   • Alcohol use: Not Currently   • Drug use: Yes     Types: Marijuana     Comment: medical marijuana       MEDICATIONS:    Current Outpatient Medications:   •  acetaminophen (TYLENOL) 500 mg tablet, Take 1,000 mg by mouth every 6 (six) hours as needed for mild pain, Disp: , Rfl:   •  b complex vitamins tablet, Take 1 tablet by mouth daily, Disp: , Rfl:   •  cholecalciferol  "(VITAMIN D3) 400 units tablet, Take 400 Units by mouth daily, Disp: , Rfl:   •  fluticasone (FLONASE) 50 mcg/act nasal spray, 1 spray into each nostril daily, Disp: , Rfl:   •  glucosamine-chondroitin 500-400 MG tablet, Take 1 tablet by mouth 2 (two) times a day , Disp: , Rfl:   •  latanoprost (XALATAN) 0.005 % ophthalmic solution, Apply 1 drop to eye daily, Disp: , Rfl:   •  loratadine (CLARITIN) 10 mg tablet, Take 10 mg by mouth daily, Disp: , Rfl:   •  Multiple Vitamins-Minerals (MULTIVITAMIN WITH MINERALS) tablet, Take 1 tablet by mouth daily, Disp: , Rfl:   •  vitamin E 100 UNIT capsule, Take 100 Units by mouth daily, Disp: , Rfl:     REVIEW OF SYSTEMS:  MSK: noted in HPI  Neuro: WNL  Pertinent items are otherwise noted in HPI.  A comprehensive review of systems was otherwise negative.    _____________________________________________________  PHYSICAL EXAMINATION:  Vital signs: BP (!) 168/107   Pulse 96   Ht 5' 3\" (1.6 m)   Wt 71.8 kg (158 lb 6.4 oz)   BMI 28.06 kg/m²   General: No acute distress, awake and alert  Psychiatric: Mood and affect appear appropriate  HEENT: Trachea Midline, No torticollis, no apparent facial trauma  Cardiovascular: No audible murmurs; Extremities appear perfused  Pulmonary: No audible wheezing or stridor  Skin: No open lesions; see further details (if any) below    MUSCULOSKELETAL EXAMINATION:  Extremities:    Right Shoulder:   No anatomical deformity   Atrophy of the deltoid  The patient's surgical scar is well healed   There is no tenderness present.   Active range of motion   90 degrees forward flexion  70 degrees abduction    There is 4/5 strength with supraspinatus testing.  Fingers are pink and mobile  Compartments are soft  Brisk capillary refill  Sensation is intact   The patient is neurovascularly intact distally in the extremity.       _____________________________________________________  STUDIES REVIEWED:  I personally reviewed the images and interpretation is as " follows:    X-rays taken 5/7/24 of right shoulder demonstrate healed greater tuberosity fracture, minimal migration of long screw, maintained alignment from previous films.     PROCEDURES PERFORMED:  None    Scribe Attestation    I,:  Saira Ramos am acting as a scribe while in the presence of the attending physician.:       I,:  Deshaun Cardenas MD personally performed the services described in this documentation    as scribed in my presence.:

## 2024-05-16 ENCOUNTER — OFFICE VISIT (OUTPATIENT)
Dept: PHYSICAL THERAPY | Facility: CLINIC | Age: 74
End: 2024-05-16
Payer: COMMERCIAL

## 2024-05-16 DIAGNOSIS — Z87.81 S/P ORIF (OPEN REDUCTION INTERNAL FIXATION) FRACTURE: Primary | ICD-10-CM

## 2024-05-16 DIAGNOSIS — Z98.890 S/P ORIF (OPEN REDUCTION INTERNAL FIXATION) FRACTURE: Primary | ICD-10-CM

## 2024-05-16 DIAGNOSIS — S42.201A CLOSED FRACTURE OF PROXIMAL END OF RIGHT HUMERUS, UNSPECIFIED FRACTURE MORPHOLOGY, INITIAL ENCOUNTER: ICD-10-CM

## 2024-05-16 PROCEDURE — 97112 NEUROMUSCULAR REEDUCATION: CPT | Performed by: PHYSICAL THERAPIST

## 2024-05-16 PROCEDURE — 97110 THERAPEUTIC EXERCISES: CPT | Performed by: PHYSICAL THERAPIST

## 2024-05-16 NOTE — PROGRESS NOTES
"Daily Note     Today's date: 2024  Patient name: Estefanía Dasilva  : 1950  MRN: [id number]  Referring provider: Deshaun Cardenas MD  Dx:   Encounter Diagnosis     ICD-10-CM    1. S/P ORIF (open reduction internal fixation) fracture  Z98.890     Z87.81       2. Closed fracture of proximal end of right humerus, unspecified fracture morphology, initial encounter  S42.201A                      Subjective: patient pleased to report that she is using her arm \"for everything\" as best as possible.      Objective: See treatment diary below      Assessment: Tolerated treatment well overall    active movements vs gravity continue to improve with (+) compensatory movements present  Functionally- she is doing well overall using her right UE more and more with everyday activities.       Plan: plan is 2 additional visits than DC to [facility]       Precautions: s/p R shoulder ORIF 10-25-23      POC expires Unit limit Auth Expiration date PT/OT/ST + Visit Limit?   24 bomn pending bomn                           Visit/Unit Tracking  AUTH Status:  Date 4/22 4/25 4/29 5/2 5/16      4/15 4/18   Pending  Used 13 14 15 16 17      11- foto 12    Remaining                      Manuals 4/8 4/15 4/18 4/22 4/25 4/29 5/2 5/16                  PROM Right shoulder  db db LA db LA db LA db                                                         Neuro Re-Ed                          Supine AA flex/ abd  A 1# x 20  20 x ea  10x2 each 1# x 20 ea   20x ea 20x each 1# x 20 ea      R UE wall slides:  flexion and abduction 10s x 5 ea  10s x 5  :10  5x each 10s x 5 ea :10  5x each 10s x 5 ea :10  5x each 10s x 5 ea                                                          Ther Ex             UBE 5'/5' 5/5' 5'/5' 5'/5' 5'/5' 5/'5' 5'/5' 5/5'                  TB OH flexion assist Blk 10s x 30  Blk 10s x 10 Blk  :10  10x Blk 10s x 20 BLK  :10  20x  Web blue 10s x 10  BTB  :10  10x Btb x 10  10s      Ball rolls cw/ccw   Gmb x 20 ea GMB  20x each " Gmb x 20 ea GMB  20x each Gmb x 20 ea GMB  20x each Gmb x 20 ea                                Active CP supine 2# x 20 ea  2# x 20  2#  20x 2# x 20  2#  20x  3# x 20  3#  20x 3# x 20                                             Serratus/ triceps 2# x 20 ea  2# x 20 ea  2#  20x each 2# x 20  2#  20x 3# aa prn x 20  3#  10x each   3# x 20 aa prn                               Stand flex/ scap to 90 20x ea 20x ea  20x  each 0# x 20  0#  10x2  vc's 20x  vc 20x each   vc's 20x ea      Bicep curls/ BOR   2# x 20 ea  2#  20x 2# x 20  2#  20x  3# x 20 ea  3#  20x each 3# x 20 ea                   TB lpd , rows       Web blue x 20 ea & shld ext  BTB  20x each Btb x 20 ea      TB ER (no money)       BTB  10x2 Btb x 20                                             Ther Activity                                                    Gait Training                                         Modalities             MHP R shoulder prn             Cp right shoulder

## 2024-05-23 ENCOUNTER — OFFICE VISIT (OUTPATIENT)
Dept: PHYSICAL THERAPY | Facility: CLINIC | Age: 74
End: 2024-05-23
Payer: COMMERCIAL

## 2024-05-23 DIAGNOSIS — Z98.890 S/P ORIF (OPEN REDUCTION INTERNAL FIXATION) FRACTURE: Primary | ICD-10-CM

## 2024-05-23 DIAGNOSIS — Z87.81 S/P ORIF (OPEN REDUCTION INTERNAL FIXATION) FRACTURE: Primary | ICD-10-CM

## 2024-05-23 DIAGNOSIS — S42.201A CLOSED FRACTURE OF PROXIMAL END OF RIGHT HUMERUS, UNSPECIFIED FRACTURE MORPHOLOGY, INITIAL ENCOUNTER: ICD-10-CM

## 2024-05-23 PROCEDURE — 97140 MANUAL THERAPY 1/> REGIONS: CPT

## 2024-05-23 PROCEDURE — 97112 NEUROMUSCULAR REEDUCATION: CPT

## 2024-05-23 PROCEDURE — 97110 THERAPEUTIC EXERCISES: CPT

## 2024-05-23 NOTE — PROGRESS NOTES
Daily Note     Today's date: 2024  Patient name: Estefanía Dasilva  : 1950  MRN: [id number]  Referring provider: Deshaun Cardenas MD  Dx:   Encounter Diagnosis     ICD-10-CM    1. S/P ORIF (open reduction internal fixation) fracture  Z98.890     Z87.81       2. Closed fracture of proximal end of right humerus, unspecified fracture morphology, initial encounter  S42.201A                      Subjective: Patient stated she has been gardening and elevating right UE whenever possible.      Objective: See treatment diary below      Assessment: Tolerated treatment  demonstrating improved right shoulder AROM. Patient finds activity activity against gravity . Patient demonstrated fatigue post treatment and would benefit from continued PT      Plan: Continue per plan of care.  Progress treatment as tolerated.       Precautions: s/p R shoulder ORIF 10-25-23      POC expires Unit limit Auth Expiration date PT/OT/ST + Visit Limit?   24 bomn pending bomn                           Visit/Unit Tracking  AUTH Status:  Date 4/22 4/25 4/29 5/2 5/16 5/23     4/15 4/18   Pending  Used 13 14 15 16 17 18     11- foto 12    Remaining                      Manuals 4/8 4/15 4/18 4/22 4/25 4/29 5/2 5/16 5/23                 PROM Right shoulder  db db LA db LA db LA db LA                                                        Neuro Re-Ed                          Supine AA flex/ abd  A 1# x 20  20 x ea  10x2 each 1# x 20 ea   20x ea 20x each 1# x 20 ea  1#  20x each    R UE wall slides:  flexion and abduction 10s x 5 ea  10s x 5  :10  5x each 10s x 5 ea :10  5x each 10s x 5 ea :10  5x each 10s x 5 ea  :10  5x each                                                        Ther Ex             UBE 5'/5' 5/5' 5'/5' 5'/5' 5'/5' 5/'5' 5'/5' 5/5' 5'/5'                 TB OH flexion assist Blk 10s x 30  Blk 10s x 10 Blk  :10  10x Blk 10s x 20 BLK  :10  20x  Web blue 10s x 10  BTB  :10  10x Btb x 10  10s  BTB  :10  10x    Ball rolls  cw/ccw   Gmb x 20 ea GMB  20x each Gmb x 20 ea GMB  20x each Gmb x 20 ea GMB  20x each Gmb x 20 ea  GMB  20x                              Active CP supine 2# x 20 ea  2# x 20  2#  20x 2# x 20  2#  20x  3# x 20  3#  20x 3# x 20  3#  20x                                           Serratus/ triceps 2# x 20 ea  2# x 20 ea  2#  20x each 2# x 20  2#  20x 3# aa prn x 20  3#  10x each   3# x 20 aa prn 2#  20x each                              Stand flex/ scap to 90 20x ea 20x ea  20x  each 0# x 20  0#  10x2  vc's 20x  vc 20x each   vc's 20x ea  20x each    Bicep curls/ BOR   2# x 20 ea  2#  20x 2# x 20  2#  20x  3# x 20 ea  3#  20x each 3# x 20 ea  3#  20x each                 TB lpd , rows       Web blue x 20 ea & shld ext  BTB  20x each Btb x 20 ea  BTB  20x each    TB ER (no money)       BTB  10x2 Btb x 20  BTB  20x                                            Ther Activity                                                    Gait Training                                         Modalities             MHP R shoulder prn             Cp right shoulder

## 2024-05-30 ENCOUNTER — OFFICE VISIT (OUTPATIENT)
Dept: PHYSICAL THERAPY | Facility: CLINIC | Age: 74
End: 2024-05-30
Payer: COMMERCIAL

## 2024-05-30 DIAGNOSIS — S42.201A CLOSED FRACTURE OF PROXIMAL END OF RIGHT HUMERUS, UNSPECIFIED FRACTURE MORPHOLOGY, INITIAL ENCOUNTER: ICD-10-CM

## 2024-05-30 DIAGNOSIS — Z87.81 S/P ORIF (OPEN REDUCTION INTERNAL FIXATION) FRACTURE: Primary | ICD-10-CM

## 2024-05-30 DIAGNOSIS — Z98.890 S/P ORIF (OPEN REDUCTION INTERNAL FIXATION) FRACTURE: Primary | ICD-10-CM

## 2024-05-30 PROCEDURE — 97112 NEUROMUSCULAR REEDUCATION: CPT | Performed by: PHYSICAL THERAPIST

## 2024-05-30 PROCEDURE — 97110 THERAPEUTIC EXERCISES: CPT | Performed by: PHYSICAL THERAPIST

## 2024-05-30 NOTE — PROGRESS NOTES
DISCHARGE SUMMARY/   Daily Note     Today's date: 2024  Patient name: Estefanía Dasilva  : 1950  MRN: [id number]  Referring provider: Deshaun Cardenas MD  Dx:   Encounter Diagnosis     ICD-10-CM    1. S/P ORIF (open reduction internal fixation) fracture  Z98.890     Z87.81       2. Closed fracture of proximal end of right humerus, unspecified fracture morphology, initial encounter  S42.201A                      Subjective: Patient states that she does feel much better since attending PT - especially over the last month or so, she is noting improving functional use of right UE  Denies pain   States that she will continue with HEP and using her right UE as much as possible.       Objective: See treatment diary below  Seated flexion = 100   abd = 89   ER = behind head to C6   IR = behind back to L4   Supine flexion = 115  abd = 110   ER  =  45 @ 90  IR = 45 @ 90  MMT  flexion = 4/5  abd = 4-/5  ER = 4-/5  IR= 4/5         Assessment: Patient is independent and competent with HEP and will be discharged to such at this time     She has benefited maximally from PT at this time, she has returned to functional use of Right UE throughout her day - not limiting herself any longer with what she can accomplish    Plan: discharge skilled PT at this time   MD follow up only prn.       Precautions: s/p R shoulder ORIF 10-25-23      POC expires Unit limit Auth Expiration date PT/OT/ST + Visit Limit?   24 bomn pending bomn                           Visit/Unit Tracking  AUTH Status:  Date 4/22 4/25 4/29 5/2 5/16 5/23 5/30    4/15 4/18   Pending  Used 13 14 15 16 17 18 19    11- foto 12    Remaining                      Manuals 4/8 4/15 4/18 4/22 4/25 4/29 5/2 5/16 5/23 5/30                PROM Right shoulder  db db LA db LA db LA db LA db                                                       Neuro Re-Ed                          Supine AA flex/ abd  A 1# x 20  20 x ea  10x2 each 1# x 20 ea   20x ea 20x each 1# x 20  ea  1#  20x each 1# x 20 ea    R UE wall slides:  flexion and abduction 10s x 5 ea  10s x 5  :10  5x each 10s x 5 ea :10  5x each 10s x 5 ea :10  5x each 10s x 5 ea  :10  5x each 10s x 5                                                        Ther Ex             UBE 5'/5' 5/5' 5'/5' 5'/5' 5'/5' 5/'5' 5'/5' 5/5' 5'/5' 5'                TB OH flexion assist Blk 10s x 30  Blk 10s x 10 Blk  :10  10x Blk 10s x 20 BLK  :10  20x  Web blue 10s x 10  BTB  :10  10x Btb x 10  10s  BTB  :10  10x Btb 10s x 10    Ball rolls cw/ccw   Gmb x 20 ea GMB  20x each Gmb x 20 ea GMB  20x each Gmb x 20 ea GMB  20x each Gmb x 20 ea  GMB  20x Gmb x 20 ea                              Active CP supine 2# x 20 ea  2# x 20  2#  20x 2# x 20  2#  20x  3# x 20  3#  20x 3# x 20  3#  20x 3# x 20                                           Serratus/ triceps 2# x 20 ea  2# x 20 ea  2#  20x each 2# x 20  2#  20x 3# aa prn x 20  3#  10x each   3# x 20 aa prn 2#  20x each 2# x 20 ea                              Stand flex/ scap to 90 20x ea 20x ea  20x  each 0# x 20  0#  10x2  vc's 20x  vc 20x each   vc's 20x ea  20x each 20x ea    Bicep curls/ BOR   2# x 20 ea  2#  20x 2# x 20  2#  20x  3# x 20 ea  3#  20x each 3# x 20 ea  3#  20x each 3# x 20 ea                 TB lpd , rows       Web blue x 20 ea & shld ext  BTB  20x each Btb x 20 ea  BTB  20x each Btb x 20 ea    TB ER (no money)       BTB  10x2 Btb x 20  BTB  20x  Btb x 20                                           Ther Activity                                                    Gait Training                                         Modalities             MHP R shoulder prn             Cp right shoulder

## 2024-08-27 ENCOUNTER — OFFICE VISIT (OUTPATIENT)
Age: 74
End: 2024-08-27
Payer: COMMERCIAL

## 2024-08-27 VITALS
HEART RATE: 96 BPM | WEIGHT: 158 LBS | TEMPERATURE: 98 F | BODY MASS INDEX: 28 KG/M2 | DIASTOLIC BLOOD PRESSURE: 60 MMHG | HEIGHT: 63 IN | SYSTOLIC BLOOD PRESSURE: 150 MMHG

## 2024-08-27 DIAGNOSIS — D22.9 NEVUS: ICD-10-CM

## 2024-08-27 DIAGNOSIS — D18.01 CHERRY ANGIOMA: ICD-10-CM

## 2024-08-27 DIAGNOSIS — L82.1 SEBORRHEIC KERATOSIS: ICD-10-CM

## 2024-08-27 DIAGNOSIS — Z13.89 SCREENING FOR SKIN CONDITION: ICD-10-CM

## 2024-08-27 DIAGNOSIS — Z85.828 HISTORY OF SKIN CANCER: Primary | ICD-10-CM

## 2024-08-27 PROCEDURE — 99213 OFFICE O/P EST LOW 20 MIN: CPT | Performed by: DERMATOLOGY

## 2024-08-27 NOTE — PATIENT INSTRUCTIONS
ACTINIC KERATOSIS    Actinic keratoses are very common on sites repeatedly exposed to the sun, especially the backs of the hands and the face, most often affecting the ears, nose, cheeks, upper lip, vermilion of the lower lip, temples, forehead and balding scalp. In severely chronically sun-damaged individuals, they may also be found on the upper trunk, upper and lower limbs, and dorsum of feet.    We discussed the theoretical premalignant (“pre-cancerous”) nature and etiology of these growths.  We discussed the prevailing notion that actinic keratoses are a reflection of abnormal skin cell development due to DNA damage by short wavelength UVB.  They are more likely to appear if the immune function is poor, due to aging, recent sun exposure, predisposing disease or certain drugs.    We discussed that the main concern is that actinic keratoses may predispose to squamous cell carcinoma. It is rare for a solitary actinic keratosis to evolve to squamous cell carcinoma (SCC), but the risk of SCC occurring at some stage in a patient with more than 10 actinic keratoses is thought to be about 10 to 15%. A tender, thickened, ulcerated or enlarging actinic keratosis is suspicious of SCC.    Actinic keratoses may be prevented by strict sun protection. If already present, keratoses may improve with a very high sun protection factor (50+) broad-spectrum sunscreen applied at least daily to affected areas, year-round.  We recommend that UPF-rated clothing and hats and sunglasses be worn whenever possible and that a sunscreen-moisturizer combination product such as Neutrogena Daily Defense be applied at least three times a day.    We performed a thorough discussion of treatment options and specific risk/benefits/alternatives including but not limited to medical “field” treatment with medications such as the following:    Topical “field area” medications such as 5-fluorouracil or Aldara (specifically, the trouble with long-term  compliance, blistering and local skin reaction versus the convenience of at-home therapy and that field therapy “gets what is not yet seen”).    Cryotherapy (specifically, local pain, scarring, dyspigmentation, blistering, possible superinfection, and treats “only what we see” versus directed treatment today).    Photodynamic therapy (specifically, local pain, scarring, dyspigmentation, blistering, possible superinfection, need to schedule for a later date, and time spent in the office versus field therapy that “gets what is not yet seen”).      BASAL CELL CARCINOMA    What is basal cell carcinoma?  Basal cell carcinoma (BCC) is a common, locally invasive, keratinocytic, or non-melanoma, skin cancer. It is also known as rodent ulcer and basalioma. Patients with BCC often develop multiple primary tumours over time.    Who gets basal cell carcinoma?  Risk factors for BCC include:  Age and sex: BCCs are particularly prevalent in elderly males. However, they also affect females and younger adults   Previous BCC or other form of skin cancer (squamous cell carcinoma, melanoma)   Sun damage (photoaging, actinic keratoses)   Repeated prior episodes of sunburn   Fair skin, blue eyes and blond or red hair--note; BCC can also affect darker skin types   Previous cutaneous injury, thermal burn, disease (eg cutaneous lupus, sebaceous naevus)   Inherited syndromes: BCC is a particular problem for families with basal cell naevus syndrome (Gorlin syndrome), Qbgzd-Encré-Qabljrvi syndrome, Rombo syndrome, Oley syndrome and xeroderma pigmentosum   Other risk factors include ionising radiation, exposure to arsenic, and immune suppression due to disease or medicines    What causes basal cell carcinoma?  The cause of BCC is multifactorial.  Most often, there are DNA mutations in the patched (PTCH) tumour suppressor gene, part of hedgehog signaling pathway   These may be triggered by exposure to ultraviolet radiation   Various spontaneous  and inherited gene defects predispose to BCC    What are the clinical features of basal cell carcinoma?  BCC is a locally invasive skin tumour. The main characteristics are:  Slowly growing plaque or nodule   Skin coloured, pink or pigmented   Varies in size from a few millimetres to several centimetres in diameter   Spontaneous bleeding or ulceration  BCC is very rarely a threat to life. A tiny proportion of BCCs grow rapidly, invade deeply, and/or metastasise to local lymph nodes.    Types of basal cell carcinoma  There are several distinct clinical types of BCC, and over 20 histological growth patterns of BCC.  Nodular BCC  Most common type of facial BCC   Shiny or pearly nodule with a smooth surface   May have central depression or ulceration, so its edges appear rolled   Blood vessels cross its surface   Cystic variant is soft, with jelly-like contents   Micronodular, microcystic and infiltrative types are potentially aggressive subtypes   Also known as nodulocystic carcinoma  Superficial BCC  Most common type in younger adults   Most common type on upper trunk and shoulders   Slightly scaly, irregular plaque   Thin, translucent rolled border   Multiple microerosions  Morphoeaform BCC  Usually found in mid-facial sites   Waxy, scar-like plaque with indistinct borders   Wide and deep subclinical extension   May infiltrate cutaneous nerves (perineural spread)   Also known as morpheic, morphoeiform or sclerosing BCC  Basosquamous carcinoma  Mixed basal cell carcinoma (BCC) and squamous cell carcinoma (SCC)   Infiltrative growth pattern   Potentially more aggressive than other forms of BCC   Also known as basisquamous carcinoma and mixed basal-squamous cell carcinoma       Complications of basal cell carcinoma    Recurrent BCC  Recurrence of BCC after initial treatment is not uncommon. Characteristics of recurrent BCC often include:  Incomplete excision or narrow margins at primary excision   Morphoeic,  micronodular, and infiltrative subtypes   Location on head and neck    Advanced BCC  Advanced BCCs are large, often neglected tumours.  They may be several centimetres in diameter   They may be deeply infiltrating into tissues below the skin   They are difficult or impossible to treat surgically    Metastatic BCC  Very rare   Primary tumour is often large, neglected or recurrent, located on head and neck, with aggressive subtype   May have had multiple prior treatments   May arise in site exposed to ionising radiation   Can be fatal    How is basal cell carcinoma diagnosed?  BCC is diagnosed clinically by the presence of a slowly enlarging skin lesion with typical appearance. The diagnosis and  by a diagnostic biopsy or following excision.  Some typical superficial BCCs on trunk and limbs are clinically diagnosed and have non-surgical treatment without histology.    What is the treatment for primary basal cell carcinoma?  The treatment for a BCC depends on its type, size and location, the number to be treated, patient factors, and the preference or expertise of the doctor. Most BCCs are treated surgically. Long-term follow-up is recommended to check for new lesions and recurrence; the latter may be unnecessary if histology has reported wide clear margins.    Excision biopsy  Excision means the lesion is cut out and the skin stitched up.  Most appropriate treatment for nodular, infiltrative and morphoeic BCCs   Should include 3 to 5 mm margin of normal skin around the tumour   Very large lesions may require flap or skin graft to repair the defect   Pathologist will report deep and lateral margins   Further surgery is recommended for lesions that are incompletely excised    Mohs micrographically controlled excision  Mohs micrographically controlled surgery involves examining carefully marked excised tissue under the microscope, layer by layer, to ensure complete excision.  Very high cure rates achieved by trained Mohs  surgeons   Used in high-risk areas of the face around eyes, lips and nose   Suitable for ill-defined, morphoeic, infiltrative and recurrent subtypes   Large defects are repaired by flap or skin graft    Superficial skin surgery  Superficial skin surgery comprises shave, curettage, and electrocautery. It is a rapid technique using local anaesthesia and does not require sutures.  Suitable for small, well-defined nodular or superficial BCCs   Lesions are usually located on trunk or limbs   Wound is left open to heal by secondary intention   Moist wound dressings lead to healing within a few weeks   Eventual scar quality variable    Cryotherapy  Cryotherapy is the treatment of a superficial skin lesion by freezing it, usually with liquid nitrogen.  Suitable for small superficial BCCs on covered areas of trunk and limbs   Best avoided for BCCs on head and neck, and distal to knees   Double freeze-thaw technique   Results in a blister that crusts over and heals within several weeks.   Leaves permanent white mirela    Photodynamic therapy  Photodynamic therapy (PDT) refers to a technique in which BCC is treated with a photosensitising chemical, and exposed to light several hours later.  Topical photosensitisers include aminolevulinic acid lotion and methyl aminolevulinate cream   Suitable for low-risk small, superficial BCCs   Best avoided if tumour in site at high risk of recurrence   Results in inflammatory reaction, maximal 3-4 days after procedure   Treatment repeated 7 days after initial treatment   Excellent cosmetic results    Imiquimod cream  Imiquimod is an immune response modifier.  Best used for superficial BCCs less than 2 cm diameter   Applied three to five times each week, for 6-16 weeks   Results in a variable inflammatory reaction, maximal at three weeks   Minimal scarring is usual    Fluorouracil cream  5-Fluorouracil cream is a topical cytotoxic agent.  Used to treat small superficial basal cell carcinomas    Requires prolonged course, eg twice daily for 6-12 weeks   Causes inflammatory reaction   Has high recurrence rates    Radiotherapy  Radiotherapy or X-ray treatment can be used to treat primary BCCs or as adjunctive treatment if margins are incomplete.  Mainly used if surgery is not suitable   Best avoided in young patients and in genetic conditions predisposing to skin cancer   Best cosmetic results achieved using multiple fractions   Typically, patient attends once-weekly for several weeks   Causes inflammatory reaction followed by scar   Risk of radiodermatitis, late recurrence, and new tumours    What is the treatment for advanced or metastatic basal cell carcinoma?  Locally advanced primary, recurrent or metastatic BCC requires multidisciplinary consultation. Often a combination of treatments is used.  Surgery   Radiotherapy   Targeted therapy  Targeted therapy refers to the hedgehog signalling pathway inhibitors, vismodegib and sonidegib. These drugs have some important risks and side effects.    How can basal cell carcinoma be prevented?  The most important way to prevent BCC is to avoid sunburn. This is especially important in childhood and early life. Fair skinned individuals and those with a personal or family history of BCC should protect their skin from sun exposure daily, year-round and lifelong.  Stay indoors or under the shade in the middle of the day   Wear covering clothing   Apply high protection factor SPF50+ broad-spectrum sunscreens generously to exposed skin if outdoors   Avoid indoor tanning (sun beds, solaria)  Oral nicotinamide (vitamin B3) in a dose of 500 mg twice daily may reduce the number and severity of BCCs.    What is the outlook for basal cell carcinoma?  Most BCCs are cured by treatment. Cure is most likely if treatment is undertaken when the lesion is small.  About 50% of people with BCC develop a second one within 3 years of the first. They are also at increased risk of other  skin cancers, especially melanoma. Regular self-skin examinations and long-term annual skin checks by an experienced health professional are recommended.        SQUAMOUS CELL CARCINOMA    What is cutaneous squamous cell carcinoma?  Cutaneous squamous cell carcinoma (SCC) is a common type of keratinocyte or non-melanoma skin cancer. It is derived from cells within the epidermis that make keratin -- the horny protein that makes up skin, hair and nails.  Cutaneous SCC is an invasive disease, referring to cancer cells that have grown beyond the epidermis. SCC can sometimes metastasise and may prove fatal.  Intraepidermal carcinoma (cutaneous SCC in situ) and mucosal SCC are considered elsewhere.    Who gets cutaneous squamous cell carcinoma?  Risk factors for cutaneous SCC include:  Age and sex: SCCs are particularly prevalent in elderly males. However, they also affect females and younger adults.   Previous SCC or another form of skin cancer (basal cell carcinoma, melanoma) are a strong predictor for further skin cancers.   Actinic keratoses   Outdoor occupation or recreation   Smoking   Fair skin, blue eyes and blond or red hair   Previous cutaneous injury, thermal burn, disease (eg cutaneous lupus, epidermolysis bullosa, leg ulcer)   Inherited syndromes: SCC is a particular problem for families with xeroderma pigmentosum and albinism   Other risk factors include ionising radiation, exposure to arsenic, and immune suppression due to disease (eg chronic lymphocytic leukaemia) or medicines. Organ transplant recipients have a massively increased risk of developing SCC.    What causes cutaneous squamous cell carcinoma?  More than 90% of cases of SCC are associated with numerous DNA mutations in multiple somatic genes. Mutations in the p53 tumour suppressor gene are caused by exposure to ultraviolet radiation (UV), especially UVB (known as signature 7). Other signature mutations relate to cigarette smoking, ageing and  immune suppression (eg, to drugs such as azathioprine). Mutations in signalling pathways affect the epidermal growth factor receptor, EFREN, Fyn, and [id number] signalling.   Beta-genus human papillomaviruses (wart virus) are thought to play a role in SCC arising in immune-suppressed populations. ?-HPV and HPV subtypes 5, 8, 17, 20, 24, and 38 have also been associated with an increased risk of cutaneous SCC in immunocompetent individuals.     What are the clinical features of cutaneous squamous cell carcinoma?  Cutaneous SCCs present as enlarging scaly or crusted lumps. They usually arise within pre-existing actinic keratosis or intraepidermal carcinoma.  They grow over weeks to months   They may ulcerate   They are often tender or painful   Located on sun-exposed sites, particularly the face, lips, ears, hands, forearms and lower legs   Size varies from a few millimetres to several centimetres in diameter.    Types of cutaneous squamous cell carcinoma  Distinct clinical types of invasive cutaneous SCC include:  Cutaneous horn -- the horn is due to excessive production of keratin   Keratoacanthoma (KA) -- a rapidly growing keratinising nodule that may resolve without treatment   Carcinoma cuniculatum (‘verrucous carcinoma’), a slow-growing, warty tumour on the sole of the foot.   Multiple eruptive SCC/KA-like lesions arising in syndromes, such as multiple self-healing squamous epitheliomas of Novak-Smith and Grzybowski syndrome  The pathologist may classify a tumour as well differentiated, moderately well differentiated, poorly differentiated or anaplastic cutaneous SCC. There are other variants.    Classification of squamous cell carcinoma by risk  Cutaneous SCC is classified as low-risk or high-risk, depending on the chance of tumour recurrence and metastasis. Characteristics of high-risk SCC include:  High-risk cutaneous squamous cell carcinoma has the following characteristics:  Diameter greater than or equal to  2 cm   Location on the ear, vermilion of the lip, central face, hands, feet, genitalia   Arising in elderly or immune suppressed patient   Histological thickness greater than 2 mm, poorly differentiated histology, or with the invasion of the subcutaneous tissue, nerves and blood vessels  Metastatic SCC is found in regional lymph nodes (80%), lungs, liver, brain, bones and skin.    Staging cutaneous squamous cell carcinoma  In 2011, the American Joint Committee on Cancer (AJCC) published a new staging systemic for cutaneous SCC for the 7th Edition of the AJCC manual. This evaluates the dimensions of the original primary tumour (T) and its metastases to lymph nodes (N).    Tumour staging for cutaneous SCC  TX: Th Primary tumour cannot be assessed  T0: No evidence of a primary tumour  Tis: Carcinoma in situ  T1: Tumour ? 2cm without high-risk features  T2: Tumour ? 2cm; or; Tumour ? 2 cm with high-risk features  T3: Tumour with the invasion of maxilla, mandible, orbit or temporal bone  T4: Tumour with the invasion of axial or appendicular skeleton or perineural invasion of skull base    Jovan staging for cutaneous SCC  NX: Regional lymph nodes cannot be assessed  N0: No regional lymph node metastasis  N1: Metastasis in one local lymph node ? 3cm  N2: Metastasis in one local lymph node ? 3cm; or; Metastasis in >1 local lymph node ? 6cm  N3: Metastasis in lymph node ? 6cm    How is squamous cell carcinoma diagnosed?  Diagnosis of cutaneous SCC is based on clinical features. The diagnosis and histological subtype are confirmed pathologically by diagnostic biopsy or following excision. See squamous cell carcinoma - pathology.  Patients with high-risk SCC may also undergo staging investigations to determine whether it has spread to lymph nodes or elsewhere. These may include:  Imaging using ultrasound scan, X-rays, CT scans, MRI scans   Lymph node or other tissue biopsies    What is the treatment for cutaneous squamous cell  carcinoma?  Cutaneous SCC is nearly always treated surgically. Most cases are excised with a 3-10 mm margin of normal tissue around a visible tumour. A flap or skin graft may be needed to repair the defect.  Other methods of removal include:  Shave, curettage, and electrocautery for low-risk tumours on trunk and limbs   Aggressive cryotherapy for very small, thin, low-risk tumours   Mohs micrographic surgery for large facial lesions with indistinct margins or recurrent tumours   Radiotherapy for an inoperable tumour, patients unsuitable for surgery, or as adjuvant    What is the treatment for advanced or metastatic squamous cell carcinoma?  Locally advanced primary, recurrent or metastatic SCC requires multidisciplinary consultation. Often a combination of treatments is used.  Surgery   Radiotherapy   Cemiplimab   Experimental targeted therapy using epidermal growth factor receptor inhibitors    How can cutaneous squamous cell carcinoma be prevented?  There is a great deal of evidence to show that very careful sun protection at any time of life reduces the number of SCCs. This is particularly important in ageing, sun-damaged, fair skin; in patients that are immune suppressed; and in those who already have actinic keratoses or previous SCC.  Stay indoors or under the shade in the middle of the day   Wear covering clothing   Apply high protection factor SPF50+ broad-spectrum sunscreens generously to exposed skin if outdoors   Avoid indoor tanning (sun beds, solaria)    Oral nicotinamide (vitamin B3) in a dose of 500 mg twice daily may reduce the number and severity of SCCs in people at high risk.  Patients with multiple squamous cell carcinomas may be prescribed an oral retinoid (acitretin or isotretinoin). These reduce the number of tumours but have some nuisance side effects.    What is the outlook for cutaneous squamous cell carcinoma?  Most SCCs are cured by treatment. A cure is most likely if treatment is  undertaken when the lesion is small. The risk of recurrence or disease-associated death is greater for tumours that are > 20 mm in diameter and/or > 2 mm in thickness at the time of surgical excision.  About 50% of people at high risk of SCC develop a second one within 5 years of the first. They are also at increased risk of other skin cancers, especially melanoma. Regular self-skin examinations and long-term annual skin checks by an experienced health professional are recommended.

## 2024-08-27 NOTE — PROGRESS NOTES
"[facility]atology Clinic Note     Patient Name: Estefanía Dasilva  Encounter Date: 08/27/2024     Have you been cared for by a [facility] Dermatologist in the last 3 years and, if so, which description applies to you?    Yes.  I have been here within the last 3 years, and my medical history has NOT changed since that time.  I am FEMALE/of child-bearing potential.    REVIEW OF SYSTEMS:  Have you recently had or currently have any of the following? No changes in my recent health.   PAST MEDICAL HISTORY:  Have you personally ever had or currently have any of the following?  If \"YES,\" then please provide more detail. No changes in my medical history.   HISTORY OF IMMUNOSUPPRESSION: Do you have a history of any of the following:  Systemic Immunosuppression such as Diabetes, Biologic or Immunotherapy, Chemotherapy, Organ Transplantation, Bone Marrow Transplantation or Prednisone?  No     Answering \"YES\" requires the addition of the dotphrase \"IMMUNOSUPPRESSED\" as the first diagnosis of the patient's visit.   FAMILY HISTORY:  Any \"first degree relatives\" (parent, brother, sister, or child) with the following?    No changes in my family's known health.   PATIENT EXPERIENCE:    Do you want the Dermatologist to perform a COMPLETE skin exam today including a clinical examination under the \"bra and underwear\" areas?  Yes  If necessary, do we have your permission to call and leave a detailed message on your Preferred Phone number that includes your specific medical information?  Yes      No Known Allergies   Current Outpatient Medications:     acetaminophen (TYLENOL) 500 mg tablet, Take 1,000 mg by mouth every 6 (six) hours as needed for mild pain, Disp: , Rfl:     b complex vitamins tablet, Take 1 tablet by mouth daily, Disp: , Rfl:     cholecalciferol (VITAMIN D3) 400 units tablet, Take 400 Units by mouth daily, Disp: , Rfl:     fluticasone (FLONASE) 50 mcg/act nasal spray, 1 spray into each nostril daily, Disp: , Rfl:     " glucosamine-chondroitin 500-400 MG tablet, Take 1 tablet by mouth 2 (two) times a day , Disp: , Rfl:     latanoprost (XALATAN) 0.005 % ophthalmic solution, Apply 1 drop to eye daily, Disp: , Rfl:     loratadine (CLARITIN) 10 mg tablet, Take 10 mg by mouth daily, Disp: , Rfl:     Multiple Vitamins-Minerals (MULTIVITAMIN WITH MINERALS) tablet, Take 1 tablet by mouth daily, Disp: , Rfl:     vitamin E 100 UNIT capsule, Take 100 Units by mouth daily, Disp: , Rfl:           Whom besides the patient is providing clinical information about today's encounter?   NO ADDITIONAL HISTORIAN (patient alone provided history)    Physical Exam and Assessment/Plan by Diagnosis:  Chief Complaint   Patient presents with    Follow-up     Skin exam, no concerns history non-melanoma    HISTORY OF BASAL CELL CARCINOMA     Physical Exam:  Anatomic Location Affected:  Left posterior neck in 2020, right back in 2019  Morphological Description of scar:  Scar well healed  Suspected Recurrence: No        Additional History of Present Condition:  History of basal cell carcinoma with no sign of recurrence     Assessment and Plan:  Based on a thorough discussion of this condition and the management approach to it (including a comprehensive discussion of the known risks, side effects and potential benefits of treatment), the patient (family) agrees to implement the following specific plan:  Recommend routine skin exams every year  Sun avoidance, protective clothing (known as UPF clothing), and the use of at least SPF 30 sunscreens is advised. Sunscreen should be reapplied every two hours when outside.         HISTORY OF SQUAMOUS CELL CARCINOMA      Physical Exam:  Anatomic Location Affected:  Back in 2019  Morphological Description of Scar:  Scar well healed  Suspected Recurrence: no  Regional adenopathy: no     Additional History of Present Condition:  History of squamous cell carcinoma with no sign of recurrence        Assessment and Plan:  Based on  "a thorough discussion of this condition and the management approach to it (including a comprehensive discussion of the known risks, side effects and potential benefits of treatment), the patient (family) agrees to implement the following specific plan:  Recommend routine skin exams every year   Sun avoidance, protective clothing (known as UPF clothing), and the use of at least SPF 30 sunscreens is advised. Sunscreen should be reapplied every two hours when outside.   MELANOCYTIC NEVI (\"Moles\")    Physical Exam:  Anatomic Location Affected: Mostly on sun-exposed areas of the trunk and extremities   Morphological Description:  Scattered, 1-4mm round to ovoid, symmetrical-appearing, even bordered, skin colored to dark brown macules/papules, mostly in sun-exposed areas  Pertinent Positives:  Pertinent Negatives:    Additional History of Present Condition:  present on exam     Assessment and Plan:  Based on a thorough discussion of this condition and the management approach to it (including a comprehensive discussion of the known risks, side effects and potential benefits of treatment), the patient (family) agrees to implement the following specific plan:  Provided handout with information regarding the ABCDE's of moles   Recommend routine skin exams every year.   Sun avoidance, protective clothing (known as UPF clothing), and the use of at least SPF 30 sunscreens is advised. Sunscreen should be reapplied every two hours when outside.       SEBORRHEIC KERATOSIS; NON-INFLAMED    Physical Exam:  Anatomic Location Affected:  scattered across trunk, extremities,  face  Morphological Description:  Flat and raised, waxy, smooth to warty textured, yellow to brownish-grey to dark brown to blackish, discrete, \"stuck-on\" appearing papules.  Pertinent Positives:  Pertinent Negatives:    Additional History of Present Condition:  Patient reports new bumps on the skin.  Denies itch, burn, pain, bleeding or ulceration.  Present " "constantly; nothing seems to make it worse or better.  No prior treatment.      Assessment and Plan:  Based on a thorough discussion of this condition and the management approach to it (including a comprehensive discussion of the known risks, side effects and potential benefits of treatment), the patient (family) agrees to implement the following specific plan:  Reassured benign        ANGIOMA (\"CHERRY ANGIOMA\")    Physical Exam:  Anatomic Location: scattered across sun exposed areas of the trunk and extremities   Morphologic Description: Firm red to reddish-blue discrete papules  Pertinent Positives:  Pertinent Negatives:    Additional History of Present Condition:  Present on exam.     Assessment and Plan:  Reassured benign             Scribe Attestation      I,:  Edward Pratt MA am acting as a scribe while in the presence of the attending physician.:       I,:  Robert Rizvi MD personally performed the services described in this documentation    as scribed in my presence.:             "

## 2024-09-09 ENCOUNTER — OFFICE VISIT (OUTPATIENT)
Age: 74
End: 2024-09-09
Payer: COMMERCIAL

## 2024-09-09 VITALS
OXYGEN SATURATION: 100 % | HEART RATE: 77 BPM | DIASTOLIC BLOOD PRESSURE: 79 MMHG | RESPIRATION RATE: 18 BRPM | TEMPERATURE: 97 F | WEIGHT: 157 LBS | SYSTOLIC BLOOD PRESSURE: 180 MMHG | BODY MASS INDEX: 27.81 KG/M2

## 2024-09-09 DIAGNOSIS — S46.811A STRAIN OF RIGHT TRAPEZIUS MUSCLE, INITIAL ENCOUNTER: Primary | ICD-10-CM

## 2024-09-09 PROCEDURE — S9088 SERVICES PROVIDED IN URGENT: HCPCS | Performed by: PHYSICIAN ASSISTANT

## 2024-09-09 PROCEDURE — 99204 OFFICE O/P NEW MOD 45 MIN: CPT | Performed by: PHYSICIAN ASSISTANT

## 2024-09-09 RX ORDER — LIDOCAINE 40 MG/G
CREAM TOPICAL 3 TIMES DAILY
Qty: 15 G | Refills: 0 | Status: SHIPPED | OUTPATIENT
Start: 2024-09-09

## 2024-09-09 RX ORDER — ACETAMINOPHEN 500 MG
500 TABLET ORAL EVERY 6 HOURS PRN
Qty: 30 TABLET | Refills: 0 | Status: SHIPPED | OUTPATIENT
Start: 2024-09-09

## 2024-09-09 NOTE — PROGRESS NOTES
[facility]        NAME: Estefanía Dasilva is a 74 y.o. female  : 1950    MRN: [id number]  DATE: 2024  TIME: 10:53 AM    Assessment and Plan   Strain of right trapezius muscle, initial encounter [S46.811A]  1. Strain of right trapezius muscle, initial encounter  lidocaine (LMX) 4 % cream    acetaminophen (TYLENOL) 500 mg tablet            Patient Instructions     Discussed proper ergonomically correct posture when sitting, standing and crocheting, etc.    Warm compresses locally 3 times daily  Aspercreme with lidocaine and diclofenac as directed do not use any other NSAIDs such as Motrin, ibuprofen, Aleve Advil or aspirin while on Aspercreme    Tylenol 500 mg 1 tablet every 6 hours as needed for additional pain relief    Follow up with PCP in 3-5 days.  Proceed to  ER if symptoms worsen.    If tests are performed, our office will contact you with results only if changes need to made to the care plan discussed with you at the visit. [facility]    Chief Complaint     Chief Complaint   Patient presents with    Shoulder Pain     Shoulder and back pain started lastnight.         History of Present Illness       74-year-old female is presenting today with complaint of upper back pain that radiates up into the shoulders and posterior neck in the last 24 hours.  She denies a fall, injury, trauma, recent heavy lifting carrying or pulling.  She denies having gardening.  She crochets and sits for long periods of time.         Review of Systems   Review of Systems   Constitutional:  Negative for activity change, appetite change, fatigue and fever.   HENT:  Negative for congestion and sore throat.    Eyes:  Negative for visual disturbance.   Gastrointestinal:  Negative for nausea and vomiting.   Endocrine: Negative for polyuria.   Genitourinary:  Negative for dysuria, frequency, hematuria, pelvic pain and urgency.   Musculoskeletal:  Negative for  myalgias.   Skin:  Negative for color change, rash and wound.   Neurological:  Negative for light-headedness and headaches.         Current Medications       Current Outpatient Medications:     acetaminophen (TYLENOL) 500 mg tablet, Take 1 tablet (500 mg total) by mouth every 6 (six) hours as needed for mild pain, Disp: 30 tablet, Rfl: 0    b complex vitamins tablet, Take 1 tablet by mouth daily, Disp: , Rfl:     cholecalciferol (VITAMIN D3) 400 units tablet, Take 400 Units by mouth daily, Disp: , Rfl:     latanoprost (XALATAN) 0.005 % ophthalmic solution, Apply 1 drop to eye daily, Disp: , Rfl:     lidocaine (LMX) 4 % cream, Apply topically 3 (three) times a day, Disp: 15 g, Rfl: 0    Multiple Vitamins-Minerals (MULTIVITAMIN WITH MINERALS) tablet, Take 1 tablet by mouth daily, Disp: , Rfl:     vitamin E 100 UNIT capsule, Take 100 Units by mouth daily, Disp: , Rfl:     acetaminophen (TYLENOL) 500 mg tablet, Take 1,000 mg by mouth every 6 (six) hours as needed for mild pain (Patient not taking: Reported on 2024), Disp: , Rfl:     fluticasone (FLONASE) 50 mcg/act nasal spray, 1 spray into each nostril daily (Patient not taking: Reported on 2024), Disp: , Rfl:     glucosamine-chondroitin 500-400 MG tablet, Take 1 tablet by mouth 2 (two) times a day  (Patient not taking: Reported on 2024), Disp: , Rfl:     loratadine (CLARITIN) 10 mg tablet, Take 10 mg by mouth daily (Patient not taking: Reported on 2024), Disp: , Rfl:     Current Allergies     Allergies as of 2024    (No Known Allergies)            The following portions of the patient's history were reviewed and updated as appropriate: allergies, current medications, past family history, past medical history, past social history, past surgical history and problem list.     Past Medical History:   Diagnosis Date    Seasonal allergies        Past Surgical History:   Procedure Laterality Date     SECTION      COMPLEX WOUND CLOSURE TO  EXTREMITY Bilateral 8/22/2019    Procedure: COMPLEX WOUND CLOSURE, RIGHT BACK;  Surgeon: Seamus Mckoy MD;  Location: [facility];  Service: Plastics    FULL THICKNESS SKIN GRAFT N/A 8/22/2019    Procedure: SKIN GRAFT FULL THICKNESS  (FTSG) EXTREMITY;  Surgeon: Seamus Mckoy MD;  Location: [facility];  Service: Plastics    MASS EXCISION N/A 7/17/2019    Procedure: EXCISION  BIOPSY LESION/MASS BACK;  Surgeon: Abel Harvey MD;  Location: [facility];  Service: General    [place] OPTX GREATER HUMERAL TUBEROSITY FX W/INT FIXJ Right 10/25/2023    Procedure: OPEN REDUCTION W/ INTERNAL FIXATION (ORIF) RIGHT PROX HUMERUS-GREATER TUBEROSITY FRACTURE;  Surgeon: Deshaun Cardenas MD;  Location: [facility];  Service: Orthopedics    SKIN LESION EXCISION Bilateral 8/22/2019    Procedure: EXCISION WIDE LESION RIGHT BACK;  Surgeon: Rafael Carter MD;  [facility]: [facility];  Service: Surgical Oncology       Family History   Problem Relation Age of Onset    Heart disease Mother     Bone cancer Father     Cancer Brother         neuroblastoma         Medications have been verified.        Objective   BP (!) 180/79   Pulse 77   Temp (!) 97 °F (36.1 °C)   Resp 18   Wt 71.2 kg (157 lb)   SpO2 100%   BMI 27.81 kg/m²        Physical Exam     Physical Exam  Vitals and nursing note reviewed.   Constitutional:       General: She is not in acute distress.     Appearance: Normal appearance. She is normal weight.   Eyes:      General: No scleral icterus.     Extraocular Movements: Extraocular movements intact.      Conjunctiva/sclera: Conjunctivae normal.      Pupils: Pupils are equal, round, and reactive to light.   Neck:        Comments: Patient demonstrates poor posture with upper shoulder rounded forward and hunched and neck extending.  Shoulder trapezius muscle tenderness with spasm that radiates up to the posterior neck, bilateral.  Cardiovascular:      Rate and Rhythm: Normal rate and regular rhythm.      Pulses: Normal pulses.   Pulmonary:       Effort: Pulmonary effort is normal. No respiratory distress.   Musculoskeletal:         General: Normal range of motion.      Cervical back: Normal range of motion and neck supple.   Lymphadenopathy:      Cervical: No cervical adenopathy.   Skin:     General: Skin is warm and dry.      Findings: No bruising, erythema or lesion.   Neurological:      General: No focal deficit present.      Mental Status: She is alert and oriented to person, place, and time.      Cranial Nerves: No cranial nerve deficit.      Coordination: Coordination normal.      Gait: Gait normal.   Psychiatric:         Mood and Affect: Mood normal.         Behavior: Behavior normal.

## 2024-09-09 NOTE — PATIENT INSTRUCTIONS
Patient Education     Neck Stretches   About this topic   Stretching is a kind of exercise. When you stretch, you make a specific muscle or group of muscles longer. Stretching is good for you. It increases blood flow to a muscle. This can help get your muscles ready for other exercises. Stretching can also help you relax and may keep you from hurting your muscles.  If you have neck problems, doing these exercises could make your problem worse.  General   Before starting with a program, ask your doctor if you are healthy enough to do these exercises. Your doctor may have you work with a  or physical therapist to make a safe exercise program to meet your needs.  Stretching Exercises   Stretching exercises keep your muscles flexible. They also stop them from getting tight. Start by doing each of these stretches 2 to 3 times. In order for your body to make changes, you will need to hold these stretches for 20 to 30 seconds. Try to do the stretches 2 to 3 times each day. Do all exercises slowly.  If you have balance problems, do not try standing stretches. There are other safer ways to stretch different muscles while sitting or lying down.  Passive neck stretches ? Put your left hand on top of your head. Your other arm can be at your side or behind your back. Pull your head toward your left shoulder until you feel a gentle stretch on the right side of your neck. Repeat on the other side using your other hand. Also, try this stretch by pulling in a diagonal direction. With your left hand on top of your head, pull your head down towards the direction of your left knee. You should feel this stretch towards the back on the right side of your neck. Repeat on the other side.  Active neck stretches:  Neck front-to-back motion ? Look down to the floor and then up at the ceiling.  Neck side-to-side motion ? Tilt your head to the side and bring your ear to your shoulder. Now, tilt your head to the other side.  Neck turning  "? Turn only your head and look over your left shoulder. Now turn only your head and look over your right shoulder.  Corner stretches:  T position ? Stand about one foot away from a corner. Bend your elbows and bring your upper arms to shoulder height. Rest your arms on the wall. Keep your back straight and gently lean forward until you feel a stretch in the front of your chest and shoulders.  V position ? Stand about one foot away from a corner. With your elbows straight, put only your hands on the wall and make a letter \"V\". Keep your back straight and gently lean forward until you feel a stretch in the front of your chest and shoulders.  Shoulder circles ? Sit with your back straight. Raise just your shoulders up towards your ears. Move them back, down, and then forward in a [place]. Repeat, moving the shoulders in a [place] going forward.  Chin tucks ? Stand straight or lie down on your back. Tuck your chin in and lengthen the back of your neck. Return to the starting position and repeat. It may help to stand up against a wall during this exercise. Try gently pushing your chin with two fingers while trying to flatten your neck against the wall. If you do this exercise lying down, try using a small rolled up washcloth under your neck. Push down into the washcloth when tucking in your chin.             What will the results be?   Prevent injury  Improve flexibility  Improve motion  Improve body posture  Lower stress  Reduce pain  Helpful tips   Stay active and work out to keep your muscles strong and flexible.  Keep a healthy weight so there is not extra stress on your joints. Eat a healthy diet to keep your muscles healthy.  Be sure you do not hold your breath when exercising. This can raise your blood pressure. If you tend to hold your breath, try counting out loud when exercising. If any exercise bothers you, stop right away.  Always warm up before stretching. Heated muscles stretch much easier than cool muscles. " Stretching cool muscles can lead to injury.  Try walking and swinging your arms at an easy pace for a few minutes to warm up your muscles. Do this again after exercising.  Never bounce when doing stretches.  Doing exercises before a meal may be a good way to get into a routine.  Exercise may be slightly uncomfortable, but you should not have sharp pains. If you do get sharp pains, stop what you are doing. If the sharp pains continue, call your doctor.  Last Reviewed Date   2021-08-16  Consumer Information Use and Disclaimer   This generalized information is a limited summary of diagnosis, treatment, and/or medication information. It is not meant to be comprehensive and should be used as a tool to help the user understand and/or assess potential diagnostic and treatment options. It does NOT include all information about conditions, treatments, medications, side effects, or risks that may apply to a specific patient. It is not intended to be medical advice or a substitute for the medical advice, diagnosis, or treatment of a health care provider based on the health care provider's examination and assessment of a patient’s specific and unique circumstances. Patients must speak with a health care provider for complete information about their health, medical questions, and treatment options, including any risks or benefits regarding use of medications. This information does not endorse any treatments or medications as safe, effective, or approved for treating a specific patient. [employer] and its affiliates disclaim any warranty or liability relating to this information or the use thereof. The use of this information is governed by the Terms of Use, available at https://www.wolterskluwer.com/en/know/clinical-effectiveness-terms   Copyright   Copyright © 2024 [employer] and its affiliates and/or licensors. All rights reserved.

## 2024-11-01 DIAGNOSIS — Z98.890 S/P ORIF (OPEN REDUCTION INTERNAL FIXATION) FRACTURE: Primary | ICD-10-CM

## 2024-11-01 DIAGNOSIS — Z87.81 S/P ORIF (OPEN REDUCTION INTERNAL FIXATION) FRACTURE: Primary | ICD-10-CM

## 2024-12-06 ENCOUNTER — VBI (OUTPATIENT)
Dept: ADMINISTRATIVE | Facility: OTHER | Age: 74
End: 2024-12-06

## 2024-12-06 NOTE — TELEPHONE ENCOUNTER
12/06/24 10:44 AM    The patient was called and the phone number was provided and/or the patient agreed to be transferred to the Central Scheduling department.Spoke to patient she took the phone number down.    Thank you.  Mirna Pope MA  [facility]  12/06/24 10:44 AM

## (undated) DEVICE — IMPERVIOUS STOCKINETTE: Brand: DEROYAL

## (undated) DEVICE — LIGHT HANDLE COVER SLEEVE DISP BLUE STELLAR

## (undated) DEVICE — DRESSING MEPILEX AG BORDER 4 X 8 IN

## (undated) DEVICE — INTENDED FOR TISSUE SEPARATION, AND OTHER PROCEDURES THAT REQUIRE A SHARP SURGICAL BLADE TO PUNCTURE OR CUT.: Brand: BARD-PARKER SAFETY BLADES SIZE 15, STERILE

## (undated) DEVICE — SUT ETHILON 2-0 FSLX 30 IN 1674H

## (undated) DEVICE — GLOVE SRG BIOGEL ORTHOPEDIC 8

## (undated) DEVICE — BETHLEHEM UNIVERSAL MINOR GEN: Brand: CARDINAL HEALTH

## (undated) DEVICE — ABDOMINAL PAD: Brand: DERMACEA

## (undated) DEVICE — HYDROPHILIC WOUND DRESSING WITH ZINC PLUS VITAMINS A AND B6.: Brand: DERMAGRAN®-B

## (undated) DEVICE — VIAL DECANTER

## (undated) DEVICE — DRAPE EQUIPMENT RF WAND

## (undated) DEVICE — INTENT TO BE USED WITH SUTURE MATERIAL FOR TISSUE CLOSURE: Brand: RICHARD-ALLAN®  NEEDLE 1/2 CIRCLE REVERSE CUTTING

## (undated) DEVICE — POOLE SUCTION HANDLE: Brand: CARDINAL HEALTH

## (undated) DEVICE — DRESSING MEPILEX AG BORDER POST-OP 4 X 8 IN

## (undated) DEVICE — DRESSING MAXORB EXTRA AG 4 IN X 4.75 IN

## (undated) DEVICE — GLOVE INDICATOR PI UNDERGLOVE SZ 7.5 BLUE

## (undated) DEVICE — MEDI-VAC YANK SUCT HNDL W/TPRD BULBOUS TIP: Brand: CARDINAL HEALTH

## (undated) DEVICE — SUT PDS II 3-0 SH 27 IN Z316H

## (undated) DEVICE — SUT PDS II 0 CT-1 27 IN Z340H

## (undated) DEVICE — SUT VICRYL 3-0 SH 27 IN J416H

## (undated) DEVICE — SCD SEQUENTIAL COMPRESSION COMFORT SLEEVE MEDIUM KNEE LENGTH: Brand: KENDALL SCD

## (undated) DEVICE — TUBING SUCTION 5MM X 12 FT

## (undated) DEVICE — INTENDED FOR TISSUE SEPARATION, AND OTHER PROCEDURES THAT REQUIRE A SHARP SURGICAL BLADE TO PUNCTURE OR CUT.: Brand: BARD-PARKER ® CARBON RIB-BACK BLADES

## (undated) DEVICE — SUT MONOCRYL 4-0 PS-2 27 IN Y426H

## (undated) DEVICE — PREP PAD BNS: Brand: CONVERTORS

## (undated) DEVICE — PAD GROUNDING ADULT

## (undated) DEVICE — SUT VICRYL 2-0 SH 27 IN UNDYED J417H

## (undated) DEVICE — GUIDEWIRE 1.4 X 150MM TROCAR TIP

## (undated) DEVICE — PENCIL ELECTROSURG E-Z CLEAN -0035H

## (undated) DEVICE — ELECTRODE BLADE MOD E-Z CLEAN 2.5IN 6.4CM -0012M

## (undated) DEVICE — OCCLUSIVE GAUZE STRIP,3% BISMUTH TRIBROMOPHENATE IN PETROLATUM BLEND: Brand: XEROFORM

## (undated) DEVICE — GLOVE INDICATOR PI UNDERGLOVE SZ 8 BLUE

## (undated) DEVICE — SUT SILK 2-0 SH 30 IN K833H

## (undated) DEVICE — DRESSING XEROFORM 5 X 9

## (undated) DEVICE — SUT ETHILON 3-0 PS-1 18 IN 1663G

## (undated) DEVICE — PLUMEPEN PRO 10FT

## (undated) DEVICE — 3M™ STERI-STRIP™ COMPOUND BENZOIN TINCTURE 40 BAGS/CARTON 4 CARTONS/CASE C1544: Brand: 3M™ STERI-STRIP™

## (undated) DEVICE — 3M™ STERI-STRIP™ REINFORCED ADHESIVE SKIN CLOSURES, R1546, 1/4 IN X 4 IN (6 MM X 100 MM), 10 STRIPS/ENVELOPE: Brand: 3M™ STERI-STRIP™

## (undated) DEVICE — CHLORAPREP HI-LITE 26ML ORANGE

## (undated) DEVICE — GUIDEWIRE 1.1 X 150MM TROCAR TIP

## (undated) DEVICE — GAUZE SPONGES,16 PLY: Brand: CURITY

## (undated) DEVICE — DRAPE C-ARM X-RAY

## (undated) DEVICE — PROXIMATE SKIN STAPLERS (35 WIDE) CONTAINS 35 STAINLESS STEEL STAPLES (FIXED HEAD): Brand: PROXIMATE

## (undated) DEVICE — SKIN MARKER DUAL TIP WITH RULER CAP, FLEXIBLE RULER AND LABELS: Brand: DEVON

## (undated) DEVICE — SUT ETHILON 3-0 FSLX 30 IN 1673H

## (undated) DEVICE — GLOVE SRG BIOGEL 7.5

## (undated) DEVICE — KERLIX BANDAGE ROLL: Brand: KERLIX

## (undated) DEVICE — GLOVE INDICATOR PI UNDERGLOVE SZ 7 BLUE

## (undated) DEVICE — GLOVE SRG BIOGEL ECLIPSE 7.5

## (undated) DEVICE — NEEDLE 25G X 1 1/2

## (undated) DEVICE — 3000CC GUARDIAN II: Brand: GUARDIAN

## (undated) DEVICE — INTENDED FOR TISSUE SEPARATION, AND OTHER PROCEDURES THAT REQUIRE A SHARP SURGICAL BLADE TO PUNCTURE OR CUT.: Brand: BARD-PARKER SAFETY BLADES SIZE 10, STERILE

## (undated) DEVICE — SUT STRATAFIX SPIRAL 3-0 PGA/PCL 30 X 30 CM SXMD2B408

## (undated) DEVICE — DRAPE C-ARMOUR

## (undated) DEVICE — DERMATOME BLADES: Brand: DERMATOME

## (undated) DEVICE — GLOVE SRG BIOGEL ECLIPSE 7

## (undated) DEVICE — SUT VICRYL PLUS 0 CTB-1 27 IN VCPB260H

## (undated) DEVICE — BETHLEHEM UNIV MAJOR ORTHO,KIT: Brand: CARDINAL HEALTH

## (undated) DEVICE — DERMACARRIERS II RATIO 1.5:1  SKIN GRAFT CARRIER

## (undated) DEVICE — SUT ETHIBOND 0 SH 30 IN X834H

## (undated) DEVICE — DRAPE TOWEL: Brand: CONVERTORS

## (undated) DEVICE — ADHESIVE SKN CLSR HISTOACRYL FLEX 0.5ML LF

## (undated) DEVICE — ARM SLING: Brand: DEROYAL

## (undated) DEVICE — BIT DRILL 2.7 X 145MM QC CANN

## (undated) DEVICE — ELECTRODE NEEDLE MOD E-Z CLEAN 2.75IN 7CM -0013M